# Patient Record
Sex: FEMALE | Race: WHITE | Employment: FULL TIME | URBAN - METROPOLITAN AREA
[De-identification: names, ages, dates, MRNs, and addresses within clinical notes are randomized per-mention and may not be internally consistent; named-entity substitution may affect disease eponyms.]

---

## 2017-02-05 PROBLEM — R82.71 GBS BACTERIURIA: Status: ACTIVE | Noted: 2017-02-05

## 2017-02-15 PROBLEM — R52 PAIN: Status: ACTIVE | Noted: 2017-02-15

## 2017-02-15 PROBLEM — Z37.9 NORMAL LABOR: Status: ACTIVE | Noted: 2017-02-15

## 2017-04-20 DIAGNOSIS — F41.9 ANXIETY: Primary | ICD-10-CM

## 2017-04-20 RX ORDER — ESCITALOPRAM OXALATE 10 MG/1
10 TABLET ORAL DAILY
Qty: 30 TABLET | Refills: 3 | Status: SHIPPED | OUTPATIENT
Start: 2017-04-20 | End: 2017-05-18 | Stop reason: SDUPTHER

## 2017-04-20 RX ORDER — HYDROXYZINE HYDROCHLORIDE 25 MG/1
25 TABLET, FILM COATED ORAL 2 TIMES DAILY PRN
Qty: 20 TABLET | Refills: 0 | Status: SHIPPED | OUTPATIENT
Start: 2017-04-20 | End: 2017-09-06 | Stop reason: SDUPTHER

## 2017-04-21 ENCOUNTER — TELEPHONE (OUTPATIENT)
Dept: INTERNAL MEDICINE CLINIC | Age: 22
End: 2017-04-21

## 2017-04-25 ENCOUNTER — OFFICE VISIT (OUTPATIENT)
Dept: INTERNAL MEDICINE CLINIC | Age: 22
End: 2017-04-25

## 2017-04-25 VITALS
HEIGHT: 62 IN | HEART RATE: 105 BPM | RESPIRATION RATE: 14 BRPM | BODY MASS INDEX: 25.98 KG/M2 | SYSTOLIC BLOOD PRESSURE: 120 MMHG | WEIGHT: 141.2 LBS | TEMPERATURE: 98.2 F | OXYGEN SATURATION: 99 % | DIASTOLIC BLOOD PRESSURE: 69 MMHG

## 2017-04-25 DIAGNOSIS — F41.9 ANXIETY: Primary | ICD-10-CM

## 2017-04-25 DIAGNOSIS — F33.1 MODERATE EPISODE OF RECURRENT MAJOR DEPRESSIVE DISORDER (HCC): ICD-10-CM

## 2017-04-25 PROCEDURE — 99213 OFFICE O/P EST LOW 20 MIN: CPT | Performed by: NURSE PRACTITIONER

## 2017-04-25 RX ORDER — BUSPIRONE HYDROCHLORIDE 7.5 MG/1
7.5 TABLET ORAL 2 TIMES DAILY
Qty: 60 TABLET | Refills: 1 | Status: SHIPPED | OUTPATIENT
Start: 2017-04-25 | End: 2017-05-18 | Stop reason: SDUPTHER

## 2017-05-05 ENCOUNTER — OFFICE VISIT (OUTPATIENT)
Dept: PSYCHOLOGY | Age: 22
End: 2017-05-05

## 2017-05-05 DIAGNOSIS — F32.A ANXIETY AND DEPRESSION: Primary | ICD-10-CM

## 2017-05-05 DIAGNOSIS — F41.9 ANXIETY AND DEPRESSION: Primary | ICD-10-CM

## 2017-05-05 PROCEDURE — 90791 PSYCH DIAGNOSTIC EVALUATION: CPT | Performed by: PSYCHOLOGIST

## 2017-05-05 ASSESSMENT — PATIENT HEALTH QUESTIONNAIRE - PHQ9
7. TROUBLE CONCENTRATING ON THINGS, SUCH AS READING THE NEWSPAPER OR WATCHING TELEVISION: 2
SUM OF ALL RESPONSES TO PHQ QUESTIONS 1-9: 20
1. LITTLE INTEREST OR PLEASURE IN DOING THINGS: 2
8. MOVING OR SPEAKING SO SLOWLY THAT OTHER PEOPLE COULD HAVE NOTICED. OR THE OPPOSITE, BEING SO FIGETY OR RESTLESS THAT YOU HAVE BEEN MOVING AROUND A LOT MORE THAN USUAL: 1
10. IF YOU CHECKED OFF ANY PROBLEMS, HOW DIFFICULT HAVE THESE PROBLEMS MADE IT FOR YOU TO DO YOUR WORK, TAKE CARE OF THINGS AT HOME, OR GET ALONG WITH OTHER PEOPLE: 3
2. FEELING DOWN, DEPRESSED OR HOPELESS: 2
9. THOUGHTS THAT YOU WOULD BE BETTER OFF DEAD, OR OF HURTING YOURSELF: 2
4. FEELING TIRED OR HAVING LITTLE ENERGY: 3
6. FEELING BAD ABOUT YOURSELF - OR THAT YOU ARE A FAILURE OR HAVE LET YOURSELF OR YOUR FAMILY DOWN: 3
SUM OF ALL RESPONSES TO PHQ9 QUESTIONS 1 & 2: 4
5. POOR APPETITE OR OVEREATING: 2
3. TROUBLE FALLING OR STAYING ASLEEP: 3

## 2017-05-18 ENCOUNTER — OFFICE VISIT (OUTPATIENT)
Dept: INTERNAL MEDICINE CLINIC | Age: 22
End: 2017-05-18

## 2017-05-18 VITALS
OXYGEN SATURATION: 99 % | HEART RATE: 101 BPM | HEIGHT: 61 IN | DIASTOLIC BLOOD PRESSURE: 60 MMHG | BODY MASS INDEX: 26.62 KG/M2 | TEMPERATURE: 98.4 F | WEIGHT: 141 LBS | SYSTOLIC BLOOD PRESSURE: 110 MMHG

## 2017-05-18 DIAGNOSIS — F41.9 ANXIETY: Primary | ICD-10-CM

## 2017-05-18 DIAGNOSIS — Z72.51 UNPROTECTED SEXUAL INTERCOURSE: ICD-10-CM

## 2017-05-18 PROCEDURE — 99213 OFFICE O/P EST LOW 20 MIN: CPT | Performed by: NURSE PRACTITIONER

## 2017-05-18 RX ORDER — ESCITALOPRAM OXALATE 10 MG/1
10 TABLET ORAL DAILY
Qty: 30 TABLET | Refills: 5 | Status: SHIPPED | OUTPATIENT
Start: 2017-05-18 | End: 2017-09-06 | Stop reason: SDUPTHER

## 2017-05-18 RX ORDER — BUSPIRONE HYDROCHLORIDE 7.5 MG/1
7.5 TABLET ORAL 2 TIMES DAILY
Qty: 60 TABLET | Refills: 5 | Status: SHIPPED | OUTPATIENT
Start: 2017-05-18 | End: 2017-09-06 | Stop reason: SDUPTHER

## 2017-05-18 RX ORDER — LEVONORGESTREL 1.5 MG/1
1.5 TABLET ORAL ONCE
Qty: 1 TABLET | Refills: 0 | Status: SHIPPED | OUTPATIENT
Start: 2017-05-18 | End: 2017-08-08

## 2017-06-16 ENCOUNTER — OFFICE VISIT (OUTPATIENT)
Dept: INTERNAL MEDICINE CLINIC | Age: 22
End: 2017-06-16

## 2017-06-16 VITALS
HEART RATE: 116 BPM | OXYGEN SATURATION: 97 % | HEIGHT: 61 IN | BODY MASS INDEX: 26.85 KG/M2 | WEIGHT: 142.2 LBS | DIASTOLIC BLOOD PRESSURE: 68 MMHG | SYSTOLIC BLOOD PRESSURE: 104 MMHG

## 2017-06-16 DIAGNOSIS — R05.9 COUGH: Primary | ICD-10-CM

## 2017-06-16 PROCEDURE — 99213 OFFICE O/P EST LOW 20 MIN: CPT | Performed by: INTERNAL MEDICINE

## 2017-06-16 RX ORDER — IBUPROFEN 800 MG/1
800 TABLET ORAL EVERY 8 HOURS PRN
Qty: 120 TABLET | Refills: 3 | Status: SHIPPED | OUTPATIENT
Start: 2017-06-16 | End: 2017-12-06 | Stop reason: ALTCHOICE

## 2017-06-16 RX ORDER — BENZONATATE 200 MG/1
200 CAPSULE ORAL 3 TIMES DAILY PRN
Qty: 30 CAPSULE | Refills: 0 | Status: SHIPPED | OUTPATIENT
Start: 2017-06-16 | End: 2017-06-23

## 2017-06-16 RX ORDER — BROMPHENIRAMINE MALEATE, PSEUDOEPHEDRINE HYDROCHLORIDE, AND DEXTROMETHORPHAN HYDROBROMIDE 2; 30; 10 MG/5ML; MG/5ML; MG/5ML
5 SYRUP ORAL 4 TIMES DAILY PRN
Qty: 120 ML | Refills: 1 | Status: SHIPPED | OUTPATIENT
Start: 2017-06-16 | End: 2017-12-06 | Stop reason: ALTCHOICE

## 2017-06-16 ASSESSMENT — ENCOUNTER SYMPTOMS
SPUTUM PRODUCTION: 0
SHORTNESS OF BREATH: 1
COUGH: 1
EYES NEGATIVE: 1
WHEEZING: 1
GASTROINTESTINAL NEGATIVE: 1
HEMOPTYSIS: 0

## 2017-07-10 ENCOUNTER — TELEPHONE (OUTPATIENT)
Dept: INTERNAL MEDICINE CLINIC | Age: 22
End: 2017-07-10

## 2017-07-10 RX ORDER — LEVONORGESTREL 1.5 MG/1
1.5 TABLET ORAL ONCE
Qty: 1 TABLET | Refills: 0 | Status: SHIPPED | OUTPATIENT
Start: 2017-07-10 | End: 2017-07-10 | Stop reason: SDUPTHER

## 2017-07-10 RX ORDER — LEVONORGESTREL 1.5 MG/1
1.5 TABLET ORAL ONCE
Qty: 1 TABLET | Refills: 0 | Status: ON HOLD | OUTPATIENT
Start: 2017-07-10 | End: 2018-06-08 | Stop reason: HOSPADM

## 2017-07-21 ENCOUNTER — TELEPHONE (OUTPATIENT)
Dept: INTERNAL MEDICINE CLINIC | Age: 22
End: 2017-07-21

## 2017-08-08 ENCOUNTER — OFFICE VISIT (OUTPATIENT)
Dept: INTERNAL MEDICINE CLINIC | Age: 22
End: 2017-08-08

## 2017-08-08 VITALS
HEIGHT: 61 IN | DIASTOLIC BLOOD PRESSURE: 60 MMHG | BODY MASS INDEX: 26.58 KG/M2 | SYSTOLIC BLOOD PRESSURE: 102 MMHG | RESPIRATION RATE: 16 BRPM | WEIGHT: 140.8 LBS | HEART RATE: 90 BPM

## 2017-08-08 DIAGNOSIS — R11.0 NAUSEA: Primary | ICD-10-CM

## 2017-08-08 DIAGNOSIS — G44.52 NEW DAILY PERSISTENT HEADACHE: ICD-10-CM

## 2017-08-08 PROCEDURE — 99213 OFFICE O/P EST LOW 20 MIN: CPT | Performed by: INTERNAL MEDICINE

## 2017-08-08 RX ORDER — PROMETHAZINE HYDROCHLORIDE 25 MG/1
25 TABLET ORAL EVERY 6 HOURS PRN
Qty: 20 TABLET | Refills: 1 | Status: SHIPPED | OUTPATIENT
Start: 2017-08-08 | End: 2017-12-06

## 2017-08-08 RX ORDER — KETOROLAC TROMETHAMINE 30 MG/ML
30 INJECTION, SOLUTION INTRAMUSCULAR; INTRAVENOUS ONCE
Status: COMPLETED | OUTPATIENT
Start: 2017-08-08 | End: 2017-08-08

## 2017-08-08 RX ADMIN — KETOROLAC TROMETHAMINE 60 MG: 30 INJECTION, SOLUTION INTRAMUSCULAR; INTRAVENOUS at 14:45

## 2017-09-01 ENCOUNTER — TELEPHONE (OUTPATIENT)
Dept: INTERNAL MEDICINE CLINIC | Age: 22
End: 2017-09-01

## 2017-09-01 DIAGNOSIS — F41.9 ANXIETY: ICD-10-CM

## 2017-09-06 ENCOUNTER — OFFICE VISIT (OUTPATIENT)
Dept: INTERNAL MEDICINE CLINIC | Age: 22
End: 2017-09-06

## 2017-09-06 VITALS
SYSTOLIC BLOOD PRESSURE: 122 MMHG | RESPIRATION RATE: 18 BRPM | WEIGHT: 141 LBS | DIASTOLIC BLOOD PRESSURE: 78 MMHG | HEART RATE: 106 BPM | OXYGEN SATURATION: 98 % | BODY MASS INDEX: 26.64 KG/M2

## 2017-09-06 DIAGNOSIS — F41.9 ANXIETY: ICD-10-CM

## 2017-09-06 DIAGNOSIS — Z23 NEEDS FLU SHOT: ICD-10-CM

## 2017-09-06 DIAGNOSIS — R51.9 SEVERE FRONTAL HEADACHES: Primary | ICD-10-CM

## 2017-09-06 PROCEDURE — 90471 IMMUNIZATION ADMIN: CPT | Performed by: INTERNAL MEDICINE

## 2017-09-06 PROCEDURE — 99213 OFFICE O/P EST LOW 20 MIN: CPT | Performed by: INTERNAL MEDICINE

## 2017-09-06 PROCEDURE — 90686 IIV4 VACC NO PRSV 0.5 ML IM: CPT | Performed by: INTERNAL MEDICINE

## 2017-09-06 RX ORDER — BUSPIRONE HYDROCHLORIDE 7.5 MG/1
7.5 TABLET ORAL 2 TIMES DAILY
Qty: 60 TABLET | Refills: 5 | Status: SHIPPED | OUTPATIENT
Start: 2017-09-06 | End: 2017-12-06

## 2017-09-06 RX ORDER — ESCITALOPRAM OXALATE 10 MG/1
10 TABLET ORAL DAILY
Qty: 30 TABLET | Refills: 5 | Status: SHIPPED | OUTPATIENT
Start: 2017-09-06 | End: 2017-12-06

## 2017-09-06 RX ORDER — HYDROXYZINE HYDROCHLORIDE 25 MG/1
25 TABLET, FILM COATED ORAL 2 TIMES DAILY PRN
Qty: 20 TABLET | Refills: 0 | Status: SHIPPED | OUTPATIENT
Start: 2017-09-06 | End: 2017-09-16

## 2017-09-06 RX ORDER — SUMATRIPTAN 100 MG/1
100 TABLET, FILM COATED ORAL
Qty: 9 TABLET | Refills: 3 | Status: SHIPPED | OUTPATIENT
Start: 2017-09-06 | End: 2017-12-06 | Stop reason: SINTOL

## 2017-09-06 ASSESSMENT — ENCOUNTER SYMPTOMS
DIARRHEA: 0
STRIDOR: 0
EYE DISCHARGE: 0
BLURRED VISION: 0
NAUSEA: 0
ABDOMINAL PAIN: 0
EYE PAIN: 0
COUGH: 0
RESPIRATORY NEGATIVE: 1
BACK PAIN: 1
GASTROINTESTINAL NEGATIVE: 1
CONSTIPATION: 0
EYES NEGATIVE: 1
DOUBLE VISION: 0
WHEEZING: 0
BLOOD IN STOOL: 0
PHOTOPHOBIA: 0
EYE REDNESS: 0
HEARTBURN: 0
VOMITING: 0
ORTHOPNEA: 0
SORE THROAT: 0
SHORTNESS OF BREATH: 0
SPUTUM PRODUCTION: 0
HEMOPTYSIS: 0

## 2017-09-12 ENCOUNTER — TELEPHONE (OUTPATIENT)
Dept: INTERNAL MEDICINE CLINIC | Age: 22
End: 2017-09-12

## 2017-10-13 ENCOUNTER — TELEPHONE (OUTPATIENT)
Dept: INTERNAL MEDICINE CLINIC | Age: 22
End: 2017-10-13

## 2017-10-13 NOTE — TELEPHONE ENCOUNTER
Patient is calling to ask if you will send magic mouth wash to her pharmacy for a canker sore which is [place] [id number] Shahid Pinto, [address]

## 2017-10-20 ENCOUNTER — TELEPHONE (OUTPATIENT)
Dept: INTERNAL MEDICINE CLINIC | Age: 22
End: 2017-10-20

## 2017-10-20 DIAGNOSIS — Z34.90 PRENATAL CARE, UNSPECIFIED TRIMESTER: ICD-10-CM

## 2017-10-20 NOTE — TELEPHONE ENCOUNTER
Sent to pharmacy but pt needs to stop all other medication and see obyn asap. Tried calling pt no answer and could not leave voicemail.

## 2017-10-20 NOTE — TELEPHONE ENCOUNTER
Pt is asking if alla will order her prenatal vitamins to pharmacy below, she states catee ordered these for her during her last pregnancy. Cheryle Renae Duke [place], [place] [phone] Presley Gomez [phone]     Disp Refills Start End    Prenatal Multivit-Min-Fe-FA (PRENATAL VITAMINS) 0.8 MG TABS (Discontinued) 30 tablet 9 6/30/2016 4/25/2017    Sig - Route:  Take 1 tablet by mouth daily - Oral

## 2017-12-06 ENCOUNTER — TELEPHONE (OUTPATIENT)
Dept: INTERNAL MEDICINE CLINIC | Age: 22
End: 2017-12-06

## 2017-12-06 ENCOUNTER — OFFICE VISIT (OUTPATIENT)
Dept: INTERNAL MEDICINE CLINIC | Age: 22
End: 2017-12-06

## 2017-12-06 VITALS
HEIGHT: 62 IN | BODY MASS INDEX: 24.56 KG/M2 | RESPIRATION RATE: 16 BRPM | TEMPERATURE: 97.7 F | WEIGHT: 133.5 LBS | SYSTOLIC BLOOD PRESSURE: 110 MMHG | HEART RATE: 113 BPM | DIASTOLIC BLOOD PRESSURE: 78 MMHG | OXYGEN SATURATION: 98 %

## 2017-12-06 DIAGNOSIS — I47.9 PAROXYSMAL TACHYCARDIA (HCC): Primary | ICD-10-CM

## 2017-12-06 DIAGNOSIS — Z23 NEED FOR TUBERCULOSIS VACCINATION: ICD-10-CM

## 2017-12-06 DIAGNOSIS — Z3A.12 12 WEEKS GESTATION OF PREGNANCY: ICD-10-CM

## 2017-12-06 DIAGNOSIS — Z23 NEED FOR PROPHYLACTIC VACCINATION AGAINST HEPATITIS A AND HEPATITIS B: ICD-10-CM

## 2017-12-06 PROCEDURE — 90472 IMMUNIZATION ADMIN EACH ADD: CPT | Performed by: INTERNAL MEDICINE

## 2017-12-06 PROCEDURE — 90471 IMMUNIZATION ADMIN: CPT | Performed by: INTERNAL MEDICINE

## 2017-12-06 PROCEDURE — 90632 HEPA VACCINE ADULT IM: CPT | Performed by: INTERNAL MEDICINE

## 2017-12-06 PROCEDURE — 99213 OFFICE O/P EST LOW 20 MIN: CPT | Performed by: INTERNAL MEDICINE

## 2017-12-06 PROCEDURE — 86580 TB INTRADERMAL TEST: CPT | Performed by: INTERNAL MEDICINE

## 2017-12-06 PROCEDURE — 90746 HEPB VACCINE 3 DOSE ADULT IM: CPT | Performed by: INTERNAL MEDICINE

## 2017-12-06 ASSESSMENT — ENCOUNTER SYMPTOMS
EYE PAIN: 0
BLOOD IN STOOL: 0
EYES NEGATIVE: 1
DIARRHEA: 0
WHEEZING: 0
BLURRED VISION: 0
SORE THROAT: 0
HEMOPTYSIS: 0
EYE DISCHARGE: 0
DOUBLE VISION: 0
EYE REDNESS: 0
NAUSEA: 0
VOMITING: 0
STRIDOR: 0
SPUTUM PRODUCTION: 0
BACK PAIN: 1
HEARTBURN: 0
CONSTIPATION: 0
GASTROINTESTINAL NEGATIVE: 1
RESPIRATORY NEGATIVE: 1
ORTHOPNEA: 0
ABDOMINAL PAIN: 0
PHOTOPHOBIA: 0
COUGH: 0
SHORTNESS OF BREATH: 0

## 2017-12-06 NOTE — PROGRESS NOTES
no tenderness. There is no rebound and no guarding. Musculoskeletal: Normal range of motion. She exhibits no edema or tenderness. Lymphadenopathy:     She has no cervical adenopathy. Neurological: She is alert and oriented to person, place, and time. She has normal reflexes. No cranial nerve deficit. She exhibits normal muscle tone. Gait normal. Coordination normal.   Skin: Skin is warm and dry. No rash noted. She is not diaphoretic. No erythema. No pallor. Psychiatric: Mood, memory, affect and judgment normal.   Nursing note and vitals reviewed. Assessment/Plan:      Encounter Diagnoses   Name Primary?  Paroxysmal tachycardia ([address] Yes    Need for prophylactic vaccination against hepatitis A and hepatitis B     12 weeks gestation of pregnancy        1. Paroxysmal tachycardia ([address]  Ok for work physical, no     2. Need for prophylactic vaccination against hepatitis A and hepatitis B  Will give twinrix today, not immune. 3. 12 weeks gestation of pregnancy  Will get on DHA  - DHA-EPA-Vit B6-B12-Folic Acid CAPS; 1 capsule daily for prenatal care  Dispense: 30 capsule; Refill: 11        Additional Orders:      No orders of the defined types were placed in this encounter. Orders Placed This Encounter   Medications    DHA-EPA-Vit B6-B12-Folic Acid CAPS     Si capsule daily for prenatal care     Dispense:  30 capsule     Refill:  11       DISPOSITION:      Return in about 1 year (around 2018).   1. Greater than 15 minutes spent with patient and significant other and >10 minutes on medication dosing, use and lifestyle modifications, home safety    Valeria Soto MD

## 2018-01-04 ENCOUNTER — TELEPHONE (OUTPATIENT)
Dept: INTERNAL MEDICINE CLINIC | Age: 23
End: 2018-01-04

## 2018-01-04 NOTE — TELEPHONE ENCOUNTER
Per Dr Shea Screen go to the ER immediately.  [facility]

## 2018-01-29 ENCOUNTER — TELEPHONE (OUTPATIENT)
Dept: INTERNAL MEDICINE CLINIC | Age: 23
End: 2018-01-29

## 2018-01-30 ENCOUNTER — OFFICE VISIT (OUTPATIENT)
Dept: INTERNAL MEDICINE CLINIC | Age: 23
End: 2018-01-30

## 2018-01-30 VITALS
WEIGHT: 131.2 LBS | SYSTOLIC BLOOD PRESSURE: 120 MMHG | DIASTOLIC BLOOD PRESSURE: 70 MMHG | TEMPERATURE: 98.6 F | HEIGHT: 61 IN | OXYGEN SATURATION: 99 % | HEART RATE: 108 BPM | BODY MASS INDEX: 24.77 KG/M2

## 2018-01-30 DIAGNOSIS — Z3A.19 19 WEEKS GESTATION OF PREGNANCY: ICD-10-CM

## 2018-01-30 DIAGNOSIS — J10.1 INFLUENZA A: Primary | ICD-10-CM

## 2018-01-30 PROCEDURE — G8420 CALC BMI NORM PARAMETERS: HCPCS | Performed by: NURSE PRACTITIONER

## 2018-01-30 PROCEDURE — 1036F TOBACCO NON-USER: CPT | Performed by: NURSE PRACTITIONER

## 2018-01-30 PROCEDURE — G8427 DOCREV CUR MEDS BY ELIG CLIN: HCPCS | Performed by: NURSE PRACTITIONER

## 2018-01-30 PROCEDURE — 99213 OFFICE O/P EST LOW 20 MIN: CPT | Performed by: NURSE PRACTITIONER

## 2018-01-30 PROCEDURE — G8484 FLU IMMUNIZE NO ADMIN: HCPCS | Performed by: NURSE PRACTITIONER

## 2018-01-30 RX ORDER — OSELTAMIVIR PHOSPHATE 75 MG/1
75 CAPSULE ORAL 2 TIMES DAILY
Qty: 10 CAPSULE | Refills: 0 | Status: SHIPPED | OUTPATIENT
Start: 2018-01-30 | End: 2018-02-04

## 2018-01-30 ASSESSMENT — ENCOUNTER SYMPTOMS
VOMITING: 0
NAUSEA: 0
COUGH: 1
SORE THROAT: 1
ABDOMINAL PAIN: 0

## 2018-01-30 NOTE — PATIENT INSTRUCTIONS
Patient Education        Influenza (Flu): Care Instructions  Your Care Instructions    Influenza (flu) is an infection in the lungs and breathing passages. It is caused by the influenza virus. There are different strains, or types, of the flu virus from year to year. Unlike the common cold, the flu comes on suddenly and the symptoms, such as a cough, congestion, fever, chills, fatigue, aches, and pains, are more severe. These symptoms may last up to 10 days. Although the flu can make you feel very sick, it usually doesn't cause serious health problems. Home treatment is usually all you need for flu symptoms. But your doctor may prescribe antiviral medicine to prevent other health problems, such as pneumonia, from developing. Older people and those who have a long-term health condition, such as lung disease, are most at risk for having pneumonia or other health problems. Follow-up care is a key part of your treatment and safety. Be sure to make and go to all appointments, and call your doctor if you are having problems. It's also a good idea to know your test results and keep a list of the medicines you take. How can you care for yourself at home? · Get plenty of rest.  · Drink plenty of fluids, enough so that your urine is light yellow or clear like water. If you have kidney, heart, or liver disease and have to limit fluids, talk with your doctor before you increase the amount of fluids you drink. · Take an over-the-counter pain medicine if needed, such as acetaminophen (Tylenol), ibuprofen (Advil, Motrin), or naproxen (Aleve), to relieve fever, headache, and muscle aches. Read and follow all instructions on the label. No one younger than 20 should take aspirin. It has been linked to Reye syndrome, a serious illness. · Do not smoke. Smoking can make the flu worse. If you need help quitting, talk to your doctor about stop-smoking programs and medicines.  These can increase your chances of quitting for good.  · Breathe moist air from a hot shower or from a sink filled with hot water to help clear a stuffy nose. · Before you use cough and cold medicines, check the label. These medicines may not be safe for young children or for people with certain health problems. · If the skin around your nose and lips becomes sore, put some petroleum jelly on the area. · To ease coughing:  ¨ Drink fluids to soothe a scratchy throat. ¨ Suck on cough drops or plain hard candy. ¨ Take an over-the-counter cough medicine that contains dextromethorphan to help you get some sleep. Read and follow all instructions on the label. ¨ Raise your head at night with an extra pillow. This may help you rest if coughing keeps you awake. · Take any prescribed medicine exactly as directed. Call your doctor if you think you are having a problem with your medicine. To avoid spreading the flu  · Wash your hands regularly, and keep your hands away from your face. · Stay home from school, work, and other public places until you are feeling better and your fever has been gone for at least 24 hours. The fever needs to have gone away on its own without the help of medicine. · Ask people living with you to talk to their doctors about preventing the flu. They may get antiviral medicine to keep from getting the flu from you. · To prevent the flu in the future, get a flu vaccine every fall. Encourage people living with you to get the vaccine. · Cover your mouth when you cough or sneeze. When should you call for help? Call 911 anytime you think you may need emergency care. For example, call if:  ? · You have severe trouble breathing. ?Call your doctor now or seek immediate medical care if:  ? · You have new or worse trouble breathing. ? · You seem to be getting much sicker. ? · You feel very sleepy or confused. ? · You have a new or higher fever. ? · You get a new rash. ? Watch closely for changes in your health, and be sure to contact your doctor if:  ? · You begin to get better and then get worse. ? · You are not getting better after 1 week. Where can you learn more? Go to [email or web address] and sign in to your [employer] account. Enter J558 in the [facility] box to learn more about \"Influenza (Flu): Care Instructions. \"     If you do not have an account, please click on the \"Sign Up Now\" link. Current as of: May 12, 2017  Content Version: 11.5  © 9780-5857 Healthwise, Incorporated. Care instructions adapted under license by [facility] ([facility]). If you have questions about a medical condition or this instruction, always ask your healthcare professional. [address] any warranty or liability for your use of this information.

## 2018-01-30 NOTE — PROGRESS NOTES
Department of Internal Medicine  Clinic Note    Date: 1/30/2018                                               Subjective/Objective:     Chief Complaint   Patient presents with    Influenza     positive A at [address] x4, 19 weeks and 2 days pregnant       HPI     Here for eval of body aches, fever, cough, sorethroat 2 days ago. Was tested at work yesterday (Works at [employer]) and tested positive for influenza A. She is here for treatment. She is 19 weeks pregnant. Using tylenol otc. No sob. She does c/o chest burning with cough. Current Outpatient Prescriptions   Medication Sig Dispense Refill    oseltamivir (TAMIFLU) 75 MG capsule Take 1 capsule by mouth 2 times daily for 5 days 10 capsule 0    DHA-EPA-Vit B6-B12-Folic Acid CAPS 1 capsule daily for prenatal care 30 capsule 11    Prenatal Multivit-Min-Fe-FA (PRENATAL VITAMINS) 0.8 MG TABS Take 1 tablet by mouth daily 90 tablet 3    nystatin (MYCOSTATIN) 519170 UNIT/ML suspension Take 5 mLs by mouth 4 times daily SWISH AND SPIT 60 mL 0    levonorgestrel (PLAN B ONE STEP) 1.5 MG tablet Take 1 tablet by mouth once for 1 dose 1 tablet 0     No current facility-administered medications for this visit. Allergies   Allergen Reactions    Adhesive Tape      SKIN IRRITATION    Zithromax [Azithromycin Dihydrate] Hives    Morphine Nausea And Vomiting and Other (See Comments)     H/A       Review of Systems   Constitutional: Positive for chills and fever. HENT: Positive for sore throat. Respiratory: Positive for cough. Gastrointestinal: Negative for abdominal pain, nausea and vomiting. Genitourinary: Negative for dysuria, vaginal bleeding, vaginal discharge and vaginal pain. Musculoskeletal: Positive for myalgias. Neurological: Negative for dizziness, weakness, numbness and headaches. All other systems reviewed and are negative.       Vitals:  /70 (Site: Left Arm, Position: Sitting, Cuff Size: Small Adult)

## 2018-04-30 ENCOUNTER — PROCEDURE VISIT (OUTPATIENT)
Dept: SURGERY | Age: 23
End: 2018-04-30

## 2018-04-30 DIAGNOSIS — M79.604 PAIN IN RIGHT LEG: Primary | ICD-10-CM

## 2018-04-30 PROCEDURE — 93971 EXTREMITY STUDY: CPT | Performed by: SURGERY

## 2018-06-06 PROBLEM — R52 PAIN: Status: ACTIVE | Noted: 2018-06-06

## 2018-06-06 PROBLEM — Z34.90 PREGNANT AND NOT YET DELIVERED, UNSPECIFIED TRIMESTER: Status: ACTIVE | Noted: 2018-06-06

## 2018-06-06 PROBLEM — Z34.90 PREGNANT: Status: ACTIVE | Noted: 2018-06-06

## 2018-07-06 PROBLEM — R52 PAIN: Status: RESOLVED | Noted: 2018-06-06 | Resolved: 2018-07-06

## 2018-08-02 ENCOUNTER — OFFICE VISIT (OUTPATIENT)
Dept: INTERNAL MEDICINE CLINIC | Age: 23
End: 2018-08-02

## 2018-08-02 VITALS
OXYGEN SATURATION: 98 % | RESPIRATION RATE: 12 BRPM | HEART RATE: 88 BPM | SYSTOLIC BLOOD PRESSURE: 110 MMHG | DIASTOLIC BLOOD PRESSURE: 60 MMHG | HEIGHT: 61 IN | BODY MASS INDEX: 26.43 KG/M2 | WEIGHT: 140 LBS

## 2018-08-02 DIAGNOSIS — F32.A ANXIETY AND DEPRESSION: Primary | ICD-10-CM

## 2018-08-02 DIAGNOSIS — F41.9 ANXIETY AND DEPRESSION: Primary | ICD-10-CM

## 2018-08-02 PROCEDURE — 99213 OFFICE O/P EST LOW 20 MIN: CPT | Performed by: NURSE PRACTITIONER

## 2018-08-02 PROCEDURE — G8417 CALC BMI ABV UP PARAM F/U: HCPCS | Performed by: NURSE PRACTITIONER

## 2018-08-02 PROCEDURE — 1036F TOBACCO NON-USER: CPT | Performed by: NURSE PRACTITIONER

## 2018-08-02 PROCEDURE — G8427 DOCREV CUR MEDS BY ELIG CLIN: HCPCS | Performed by: NURSE PRACTITIONER

## 2018-08-02 RX ORDER — HYDROXYZINE HYDROCHLORIDE 25 MG/1
25 TABLET, FILM COATED ORAL 3 TIMES DAILY PRN
Qty: 30 TABLET | Refills: 0 | Status: SHIPPED | OUTPATIENT
Start: 2018-08-02 | End: 2018-08-12

## 2018-08-02 RX ORDER — FLUOXETINE 10 MG/1
10 CAPSULE ORAL DAILY
Qty: 30 CAPSULE | Refills: 3 | Status: SHIPPED | OUTPATIENT
Start: 2018-08-02 | End: 2018-08-28

## 2018-08-02 ASSESSMENT — ENCOUNTER SYMPTOMS
NAUSEA: 0
COUGH: 0
DIARRHEA: 0
VOMITING: 0
SHORTNESS OF BREATH: 0
ABDOMINAL PAIN: 0

## 2018-08-02 ASSESSMENT — PATIENT HEALTH QUESTIONNAIRE - PHQ9
1. LITTLE INTEREST OR PLEASURE IN DOING THINGS: 1
SUM OF ALL RESPONSES TO PHQ9 QUESTIONS 1 & 2: 2
SUM OF ALL RESPONSES TO PHQ QUESTIONS 1-9: 2
2. FEELING DOWN, DEPRESSED OR HOPELESS: 1

## 2018-08-02 NOTE — PROGRESS NOTES
Department of Internal Medicine  Clinic Note    Date: 8/2/2018                                               Subjective/Objective:     Chief Complaint   Patient presents with    Anxiety       HPI (location/radiation, quality, severity)    Pt here for eval of anxiety and depression. She had a baby 6/6 and was started on prozac in the hospital d/t h/o post partum depression. She states she never took the medication after she was discharged from the hospital. She does feel depressed and anxious on a daily basis. She denies suicidal or homicidal ideation. She denies any thoughts of harming the baby. She does have sleep disturbances which she attributes somewhat to the anxiety and depression. Patient is interested in being restarted on medication for the depression and anxiety and also interested in speaking to a psychiatrist. She did see Dr. Elmer Andersen, psychology here in the office. Tobacco:  reports that she has never smoked. She has never used smokeless tobacco.  ETOH:  reports that she drinks alcohol. Current Outpatient Prescriptions   Medication Sig Dispense Refill    FLUoxetine (PROZAC) 10 MG capsule Take 1 capsule by mouth daily 30 capsule 3    hydrOXYzine (ATARAX) 25 MG tablet Take 1 tablet by mouth 3 times daily as needed for Anxiety 30 tablet 0    ibuprofen (ADVIL;MOTRIN) 800 MG tablet Take 1 tablet by mouth every 8 hours as needed for Pain 60 tablet 1    FLUoxetine (PROZAC) 10 MG capsule Take 1 capsule by mouth daily 30 capsule 1    docusate sodium (COLACE, DULCOLAX) 100 MG CAPS Take 100 mg by mouth 2 times daily 60 capsule 1    Prenatal Multivit-Min-Fe-FA (PRENATAL VITAMINS) 0.8 MG TABS Take 1 tablet by mouth daily 90 tablet 3     No current facility-administered medications for this visit.         Allergies   Allergen Reactions    Adhesive Tape      SKIN IRRITATION    Zithromax [Azithromycin Dihydrate] Hives    Morphine Nausea And Vomiting and Other (See Comments)     H/A       Review of by mouth daily  -     hydrOXYzine (ATARAX) 25 MG tablet; Take 1 tablet by mouth 3 times daily as needed for Anxiety  Conservative measures discussed. Discussed possibly using melatonin at bedtime as needed to help with sleep. Discussed with patient and she is to have any increased depressive symptoms, suicidal or homicidal ideation to immediately call the office and/or go to the emergency department. Patient verbalized understanding to this. I did give patient a referral to Pawel Bolton psychiatric nurse practitioner and recommended that the patient make an appointment for us and if she could get one     Past, Family, and Social history reviewed with patient during this visit. RX management: see under diagnosis section    No orders of the defined types were placed in this encounter. Return if symptoms worsen or fail to improve, for Pawel Bolton, psychiatric nurse practitioner 1-2 weeks.       MARTY Veras     8/2/2018  3:23 PM

## 2018-08-17 ENCOUNTER — TELEPHONE (OUTPATIENT)
Dept: INTERNAL MEDICINE CLINIC | Age: 23
End: 2018-08-17

## 2018-08-17 NOTE — TELEPHONE ENCOUNTER
pts new patient appt was canceleld 8/14 with dante. Pt states she was supposed to hear back on a reschedule time and has not heard anything as of yet.  Is there a time pt can be rescheduled?      #[phone]

## 2018-08-28 ENCOUNTER — OFFICE VISIT (OUTPATIENT)
Dept: PSYCHIATRY | Age: 23
End: 2018-08-28

## 2018-08-28 VITALS
WEIGHT: 139.4 LBS | HEART RATE: 70 BPM | BODY MASS INDEX: 25.65 KG/M2 | DIASTOLIC BLOOD PRESSURE: 78 MMHG | HEIGHT: 62 IN | SYSTOLIC BLOOD PRESSURE: 112 MMHG

## 2018-08-28 DIAGNOSIS — F33.2 SEVERE EPISODE OF RECURRENT MAJOR DEPRESSIVE DISORDER, WITHOUT PSYCHOTIC FEATURES (HCC): ICD-10-CM

## 2018-08-28 DIAGNOSIS — F41.1 GAD (GENERALIZED ANXIETY DISORDER): Primary | ICD-10-CM

## 2018-08-28 LAB
A/G RATIO: 1.4 (ref 1.1–2.2)
ALBUMIN SERPL-MCNC: 4.8 G/DL (ref 3.4–5)
ALP BLD-CCNC: 93 U/L (ref 40–129)
ALT SERPL-CCNC: 30 U/L (ref 10–40)
AMPHETAMINE SCREEN, URINE: NORMAL
ANION GAP SERPL CALCULATED.3IONS-SCNC: 15 MMOL/L (ref 3–16)
AST SERPL-CCNC: 22 U/L (ref 15–37)
BARBITURATE SCREEN URINE: NORMAL
BASOPHILS ABSOLUTE: 0 K/UL (ref 0–0.2)
BASOPHILS RELATIVE PERCENT: 0.3 %
BENZODIAZEPINE SCREEN, URINE: NORMAL
BILIRUB SERPL-MCNC: 0.3 MG/DL (ref 0–1)
BUN BLDV-MCNC: 6 MG/DL (ref 7–20)
CALCIUM SERPL-MCNC: 9.5 MG/DL (ref 8.3–10.6)
CANNABINOID SCREEN URINE: NORMAL
CHLORIDE BLD-SCNC: 103 MMOL/L (ref 99–110)
CHOLESTEROL, TOTAL: 129 MG/DL (ref 0–199)
CO2: 23 MMOL/L (ref 21–32)
COCAINE METABOLITE SCREEN URINE: NORMAL
CREAT SERPL-MCNC: 0.7 MG/DL (ref 0.6–1.1)
EOSINOPHILS ABSOLUTE: 0.1 K/UL (ref 0–0.6)
EOSINOPHILS RELATIVE PERCENT: 1.3 %
GFR AFRICAN AMERICAN: >60
GFR NON-AFRICAN AMERICAN: >60
GLOBULIN: 3.4 G/DL
GLUCOSE BLD-MCNC: 90 MG/DL (ref 70–99)
HCG(URINE) PREGNANCY TEST: NEGATIVE
HCT VFR BLD CALC: 34.3 % (ref 36–48)
HDLC SERPL-MCNC: 38 MG/DL (ref 40–60)
HEMOGLOBIN: 11.1 G/DL (ref 12–16)
LDL CHOLESTEROL CALCULATED: 73 MG/DL
LYMPHOCYTES ABSOLUTE: 1.7 K/UL (ref 1–5.1)
LYMPHOCYTES RELATIVE PERCENT: 22.9 %
Lab: NORMAL
MCH RBC QN AUTO: 23.3 PG (ref 26–34)
MCHC RBC AUTO-ENTMCNC: 32.3 G/DL (ref 31–36)
MCV RBC AUTO: 72.3 FL (ref 80–100)
METHADONE SCREEN, URINE: NORMAL
MONOCYTES ABSOLUTE: 0.5 K/UL (ref 0–1.3)
MONOCYTES RELATIVE PERCENT: 6.8 %
NEUTROPHILS ABSOLUTE: 5 K/UL (ref 1.7–7.7)
NEUTROPHILS RELATIVE PERCENT: 68.7 %
OPIATE SCREEN URINE: NORMAL
OXYCODONE URINE: NORMAL
PDW BLD-RTO: 16.1 % (ref 12.4–15.4)
PH UA: 6
PHENCYCLIDINE SCREEN URINE: NORMAL
PLATELET # BLD: 367 K/UL (ref 135–450)
PMV BLD AUTO: 8 FL (ref 5–10.5)
POTASSIUM SERPL-SCNC: 4.2 MMOL/L (ref 3.5–5.1)
PROPOXYPHENE SCREEN: NORMAL
RBC # BLD: 4.74 M/UL (ref 4–5.2)
SODIUM BLD-SCNC: 141 MMOL/L (ref 136–145)
TOTAL PROTEIN: 8.2 G/DL (ref 6.4–8.2)
TRIGL SERPL-MCNC: 92 MG/DL (ref 0–150)
TSH REFLEX FT4: 1.24 UIU/ML (ref 0.27–4.2)
VITAMIN D 25-HYDROXY: 22.4 NG/ML
VLDLC SERPL CALC-MCNC: 18 MG/DL
WBC # BLD: 7.3 K/UL (ref 4–11)

## 2018-08-28 PROCEDURE — 1036F TOBACCO NON-USER: CPT | Performed by: NURSE PRACTITIONER

## 2018-08-28 PROCEDURE — G8427 DOCREV CUR MEDS BY ELIG CLIN: HCPCS | Performed by: NURSE PRACTITIONER

## 2018-08-28 PROCEDURE — G8417 CALC BMI ABV UP PARAM F/U: HCPCS | Performed by: NURSE PRACTITIONER

## 2018-08-28 PROCEDURE — 99204 OFFICE O/P NEW MOD 45 MIN: CPT | Performed by: NURSE PRACTITIONER

## 2018-08-28 RX ORDER — FLUOXETINE HYDROCHLORIDE 20 MG/1
20 CAPSULE ORAL DAILY
Qty: 30 CAPSULE | Refills: 2 | Status: SHIPPED | OUTPATIENT
Start: 2018-08-28 | End: 2018-10-25 | Stop reason: SDUPTHER

## 2018-08-28 RX ORDER — LORAZEPAM 0.5 MG/1
0.5 TABLET ORAL 2 TIMES DAILY PRN
Qty: 28 TABLET | Refills: 0 | Status: SHIPPED | OUTPATIENT
Start: 2018-08-28 | End: 2018-09-11

## 2018-08-28 ASSESSMENT — PATIENT HEALTH QUESTIONNAIRE - PHQ9
2. FEELING DOWN, DEPRESSED OR HOPELESS: 2
1. LITTLE INTEREST OR PLEASURE IN DOING THINGS: 3
10. IF YOU CHECKED OFF ANY PROBLEMS, HOW DIFFICULT HAVE THESE PROBLEMS MADE IT FOR YOU TO DO YOUR WORK, TAKE CARE OF THINGS AT HOME, OR GET ALONG WITH OTHER PEOPLE: 3
7. TROUBLE CONCENTRATING ON THINGS, SUCH AS READING THE NEWSPAPER OR WATCHING TELEVISION: 3
4. FEELING TIRED OR HAVING LITTLE ENERGY: 3
SUM OF ALL RESPONSES TO PHQ9 QUESTIONS 1 & 2: 5
9. THOUGHTS THAT YOU WOULD BE BETTER OFF DEAD, OR OF HURTING YOURSELF: 2
6. FEELING BAD ABOUT YOURSELF - OR THAT YOU ARE A FAILURE OR HAVE LET YOURSELF OR YOUR FAMILY DOWN: 2
SUM OF ALL RESPONSES TO PHQ QUESTIONS 1-9: 24
8. MOVING OR SPEAKING SO SLOWLY THAT OTHER PEOPLE COULD HAVE NOTICED. OR THE OPPOSITE, BEING SO FIGETY OR RESTLESS THAT YOU HAVE BEEN MOVING AROUND A LOT MORE THAN USUAL: 3
5. POOR APPETITE OR OVEREATING: 3
3. TROUBLE FALLING OR STAYING ASLEEP: 3
SUM OF ALL RESPONSES TO PHQ QUESTIONS 1-9: 24

## 2018-08-28 ASSESSMENT — ANXIETY QUESTIONNAIRES
2. NOT BEING ABLE TO STOP OR CONTROL WORRYING: 3-NEARLY EVERY DAY
6. BECOMING EASILY ANNOYED OR IRRITABLE: 3-NEARLY EVERY DAY
3. WORRYING TOO MUCH ABOUT DIFFERENT THINGS: 3-NEARLY EVERY DAY
1. FEELING NERVOUS, ANXIOUS, OR ON EDGE: 3-NEARLY EVERY DAY
4. TROUBLE RELAXING: 3-NEARLY EVERY DAY
GAD7 TOTAL SCORE: 20
7. FEELING AFRAID AS IF SOMETHING AWFUL MIGHT HAPPEN: 3-NEARLY EVERY DAY
5. BEING SO RESTLESS THAT IT IS HARD TO SIT STILL: 2-OVER HALF THE DAYS

## 2018-08-28 NOTE — PROGRESS NOTES
day\"   Rehabs/Complicated W/D: denies    Past Medical/Surgical History:   Past Medical History:   Diagnosis Date    Allergic rhinitis     Anxiety     Depression     Headache(784.0)     Miscarriage 12/15     Past Surgical History:   Procedure Laterality Date    TYMPANOSTOMY TUBE PLACEMENT           PCP: AMMY Florian CNP      Social/Developmental History:    Developmental: Born and raised/upbringing: [place]   Marital: dating current boyfriend and father of children x 2 years   Children: 21 month old son, 4 month old son (born 6/2018)   Family: parents recently ; 4 sibs, limited contact with 2 brothers and one sister, recently starting to develop relationship with younger sister   Housing: with boyfriend and 2 sons, friend and her boyfriend and their son   Occupation/Income: [employer], [place] [place] [employer], full time   Education: has 2 terms left [facility] [employer] [facility] [facility] denies              Restoration:  denies   Legal hx: denies   Trauma hx: denies   Violence hx: + fights in high school, was expelled freshman year   Access to firearms: No    Primary Support System: none    Family History:    Medical/Psychiatric History:  Family History   Problem Relation Age of Onset    Asthma Father     Asthma Maternal Grandmother     Asthma Maternal Grandfather     Kidney Disease Sister         frequent UTI's         History of completed suicide:denies    Allergies:    Allergies   Allergen Reactions    Adhesive Tape      SKIN IRRITATION    Zithromax [Azithromycin Dihydrate] Hives    Morphine Nausea And Vomiting and Other (See Comments)     H/A         Current Medications:     Current Outpatient Prescriptions on File Prior to Visit   Medication Sig Dispense Refill    ibuprofen (ADVIL;MOTRIN) 800 MG tablet Take 1 tablet by mouth every 8 hours as needed for Pain 60 tablet 1    docusate sodium (COLACE, DULCOLAX) 100 MG CAPS Take 100 mg by mouth 2 times daily 60 capsule 1    Prenatal Multivit-Min-Fe-FA (PRENATAL VITAMINS) 0.8 MG TABS Take 1 tablet by mouth daily 90 tablet 3     No current facility-administered medications on file prior to visit. Controlled substances monitoring: requested oaars report, not available. OBJECTIVE:  Vitals:   Vitals:    08/28/18 0900   BP: 112/78   Pulse: 70     Wt Readings from Last 3 Encounters:   08/28/18 139 lb 6.4 oz (63.2 kg)   08/02/18 140 lb (63.5 kg)   06/06/18 154 lb (69.9 kg)           ROS: Denies trouble with fever, rash, headache, vision changes, chest pain, shortness of breath, nausea, extremity pain, weakness, dysuria.      Mental Status Exam:     Appearance    alert, cooperative, appropriate dress for season in work uniform, well groomed, appears stated age  Muscle strength/tone: no atrophy or abnormal movements  Gait/station: normal  Speech    spontaneous, normal rate, normal volume and well articulated  Mood    Anxious  Guilty  Depressed  Low self-esteem  Irritability  Panic attacks  Affect    depressed affect Congruent to thought content and mood  Thought Content    hopelessness, worthlessness, excessive guilt, excessive preoccupations, intrusive thoughts, paranoid thoughts, cognitive distortions and all or nothing thinking, no delusions voiced  Thought Process    coherent   Associations    logical connections  Perceptions: denies AH/VH, does not appear preoccupied with the internal environment  Insight    Fair  Judgment    fair  Orientation    oriented to person, place, time, and general circumstances  Memory    recent and remote memory intact  Attention/Concentration    intact  Ability to understand instructions Yes  Ability to respond meaningfully Yes  Language: [address] [place] [address] of knowledge/Intellect: Average  SI:   suicidal ideation with no plan or intent  HI: Denies HI    Labs:   Lab Review   Admission on 06/06/2018, Discharged on 06/08/2018   Component Date Value    Color, UA 06/06/2018 YELLOW     Clarity, UA 06/06/2018 CLOUDY*  Glucose, Ur 06/06/2018 Negative     Bilirubin Urine 06/06/2018 Negative     Ketones, Urine 06/06/2018 Negative     Specific Gravity, UA 06/06/2018 1.019     Blood, Urine 06/06/2018 Negative     pH, UA 06/06/2018 6.5     Protein, UA 06/06/2018 30*    Urobilinogen, Urine 06/06/2018 0.2     Nitrite, Urine 06/06/2018 Negative     Leukocyte Esterase, Urine 06/06/2018 Negative     Microscopic Examination 06/06/2018 YES     Urine Type 06/06/2018 Not Specified     Bacteria, UA 06/06/2018 1+*    Hyaline Casts, UA 06/06/2018 2     WBC, UA 06/06/2018 2     RBC, UA 06/06/2018 2     Epi Cells 06/06/2018 4     WBC 06/06/2018 10.0     RBC 06/06/2018 4.66     Hemoglobin 06/06/2018 10.7*    Hematocrit 06/06/2018 32.1*    MCV 06/06/2018 69.0*    MCH 06/06/2018 22.9*    MCHC 06/06/2018 33.2     RDW 06/06/2018 15.6*    Platelets 31/28/2724 255     MPV 06/06/2018 8.7     Path Consult 06/06/2018 Yes     RPR 06/06/2018 Non-reactive     ABO/Rh 06/06/2018 O POS     Antibody Screen 06/06/2018 NEG     Amphetamine Screen, Urine 06/06/2018 Neg     Barbiturate Screen, Ur 06/06/2018 Neg     Benzodiazepine Screen, U* 06/06/2018 Neg     Cannabinoid Scrn, Ur 06/06/2018 Neg     Cocaine Metabolite Scree* 06/06/2018 Neg     Opiate Scrn, Ur 06/06/2018 Neg     PCP Screen, Urine 06/06/2018 Neg     Methadone Screen, Urine 06/06/2018 Neg     Propoxyphene Scrn, Ur 06/06/2018 Neg     Oxycodone Urine 06/06/2018 Neg     Buprenorphine Urine 06/06/2018 Neg     pH, UA 06/06/2018 6.0     Drug Screen Comment: 06/06/2018 see below     Hep B S Ag Interp 11/22/2017 negative     HIV-1/HIV-2 Ab 11/22/2017 nonreactive     Rubella Antibody IgG 11/22/2017 immune      Path Consult 06/06/2018 Reviewed    Admission on 04/29/2018, Discharged on 04/29/2018   Component Date Value    Color, UA 04/29/2018 YELLOW     Clarity, UA 04/29/2018 Clear     Glucose, Ur 04/29/2018 Negative     Bilirubin Urine 04/29/2018 Negative     [place] [address]: [place], [facility] laurakrishan, [address], Intake/Walk in hours are Monday through Friday between 8:00 am & 12:00 pm. 2nd Floor. [address], [address] Office Location [id number] in photo Id, proof of health coverage if any, and proof of income. 110 [facility] [address] [facility], 7101 [facility] Offices [facility], 2620 [facility] Office [address] [place], 707 [facility], [address] Office  [address], [address]   [facility] [address], [facility] [address], [address] House/[place] [address]   [phone] Case Management   Mental Health Prevention   Substance Abuse Therapy  Adin Torres 1527 Assistance   [facility] Lam, [facility], & [facility]. [facility], Clinical counseling and assessment are provided to assist individuals.  Individual/Group Therapy    Couples/Family Therapy   Psychological Testing   Case Management [address] Residents)   Psychiatric Medication Services  You must call [phone]. You must have Medicaid. [address], [address]. [address]  [phone] 645-CARE ([phone])    National Suicide Prevention Lifeline  (082) 300-TALK (8750)  www.suicidepreventionlifeline. [place] of [address] Bridget Miller (PES): [address]  1305 [facility], [address]      Text Support  Text 371589 \"connect\" if suicidal for contact via text or phone call       Suicide Hotline   (819) 683-6879

## 2018-08-28 NOTE — PATIENT INSTRUCTIONS
Counseling Services     [facility]  Phone: [phone]  Locations:   [facility], [place] [address] CHEPE PAEZ: Martinaeencasey. [address] [facility] [address], 2900 [facility] Tommie [address] [facility] [address]: 10 [facility], [address]: [address], [address] [facility] [address], [place] [address], Intake/Walk in hours are Monday through Friday between 8:00 am & 12:00 pm. 2nd Floor. [address], [address] Office Location [phone]  · Bring in photo Id, proof of health coverage if any, and proof of income. · [address] [facility], [address]  · 450 [facility], [address]  · [address] [place] [address]  · [address], [address]  · [address] [facility], [address]  · [address] [place], [address] House/Center [address], [address]   [phone]) 136-CARE (3394)  · Case Management  · Mental Health Prevention  · Substance Abuse Therapy  · Housing Assistance  · Jackquline Maroon, Frankey Arms, & [address] [facility] [address], Clinical counseling and assessment are provided to assist individuals. · Individual/Group Therapy   · Couples/Family Therapy  · Psychological Testing  · Case Management [facility])  · Psychiatric Medication Services  You must call [phone]. You must have Medicaid. [address], [address] [facility] [address]  (041) 852-CARE (6352)     National Suicide Prevention Lifeline  (268) 596-TALK (0389)  www.suicidepreventionlifeline. Psychiatric Aaron (PES): 835.621.3929  [facility] [address] [place] [address]        Text Support  Text 677237 \"connect\" if suicidal for contact via text or phone call         Suicide Hotline   (995) 647-6502

## 2018-08-29 LAB
ESTIMATED AVERAGE GLUCOSE: 99.7 MG/DL
HBA1C MFR BLD: 5.1 %

## 2018-09-07 ENCOUNTER — TELEPHONE (OUTPATIENT)
Dept: INTERNAL MEDICINE CLINIC | Age: 23
End: 2018-09-07

## 2018-09-07 NOTE — TELEPHONE ENCOUNTER
Pt asking if John Martinez would be willing to order the morning after pill?      PA#[phone]    Pharmacy:  [facility] [address], [address] [place] [address] [place] [address]

## 2018-09-26 PROBLEM — R52 PAIN: Status: RESOLVED | Noted: 2017-02-15 | Resolved: 2018-09-26

## 2018-10-25 ENCOUNTER — OFFICE VISIT (OUTPATIENT)
Dept: PSYCHIATRY | Age: 23
End: 2018-10-25
Payer: MEDICARE

## 2018-10-25 VITALS
HEIGHT: 61 IN | BODY MASS INDEX: 25.79 KG/M2 | DIASTOLIC BLOOD PRESSURE: 76 MMHG | SYSTOLIC BLOOD PRESSURE: 120 MMHG | WEIGHT: 136.6 LBS | HEART RATE: 76 BPM

## 2018-10-25 DIAGNOSIS — F33.2 SEVERE EPISODE OF RECURRENT MAJOR DEPRESSIVE DISORDER, WITHOUT PSYCHOTIC FEATURES (HCC): Primary | ICD-10-CM

## 2018-10-25 DIAGNOSIS — F41.1 GAD (GENERALIZED ANXIETY DISORDER): ICD-10-CM

## 2018-10-25 PROCEDURE — 99214 OFFICE O/P EST MOD 30 MIN: CPT | Performed by: NURSE PRACTITIONER

## 2018-10-25 PROCEDURE — G8417 CALC BMI ABV UP PARAM F/U: HCPCS | Performed by: NURSE PRACTITIONER

## 2018-10-25 PROCEDURE — G8482 FLU IMMUNIZE ORDER/ADMIN: HCPCS | Performed by: NURSE PRACTITIONER

## 2018-10-25 PROCEDURE — 1036F TOBACCO NON-USER: CPT | Performed by: NURSE PRACTITIONER

## 2018-10-25 PROCEDURE — G8427 DOCREV CUR MEDS BY ELIG CLIN: HCPCS | Performed by: NURSE PRACTITIONER

## 2018-10-25 RX ORDER — FLUOXETINE HYDROCHLORIDE 20 MG/1
20 CAPSULE ORAL DAILY
Qty: 30 CAPSULE | Refills: 3 | Status: SHIPPED | OUTPATIENT
Start: 2018-10-25 | End: 2019-07-08

## 2018-10-25 ASSESSMENT — PATIENT HEALTH QUESTIONNAIRE - PHQ9
1. LITTLE INTEREST OR PLEASURE IN DOING THINGS: 2
5. POOR APPETITE OR OVEREATING: 3
2. FEELING DOWN, DEPRESSED OR HOPELESS: 2
8. MOVING OR SPEAKING SO SLOWLY THAT OTHER PEOPLE COULD HAVE NOTICED. OR THE OPPOSITE, BEING SO FIGETY OR RESTLESS THAT YOU HAVE BEEN MOVING AROUND A LOT MORE THAN USUAL: 2
9. THOUGHTS THAT YOU WOULD BE BETTER OFF DEAD, OR OF HURTING YOURSELF: 1
SUM OF ALL RESPONSES TO PHQ QUESTIONS 1-9: 18
SUM OF ALL RESPONSES TO PHQ QUESTIONS 1-9: 18
SUM OF ALL RESPONSES TO PHQ9 QUESTIONS 1 & 2: 4
3. TROUBLE FALLING OR STAYING ASLEEP: 3
4. FEELING TIRED OR HAVING LITTLE ENERGY: 2
7. TROUBLE CONCENTRATING ON THINGS, SUCH AS READING THE NEWSPAPER OR WATCHING TELEVISION: 2
6. FEELING BAD ABOUT YOURSELF - OR THAT YOU ARE A FAILURE OR HAVE LET YOURSELF OR YOUR FAMILY DOWN: 1
10. IF YOU CHECKED OFF ANY PROBLEMS, HOW DIFFICULT HAVE THESE PROBLEMS MADE IT FOR YOU TO DO YOUR WORK, TAKE CARE OF THINGS AT HOME, OR GET ALONG WITH OTHER PEOPLE: 2

## 2018-10-25 ASSESSMENT — ANXIETY QUESTIONNAIRES
7. FEELING AFRAID AS IF SOMETHING AWFUL MIGHT HAPPEN: 3-NEARLY EVERY DAY
6. BECOMING EASILY ANNOYED OR IRRITABLE: 3-NEARLY EVERY DAY
5. BEING SO RESTLESS THAT IT IS HARD TO SIT STILL: 0-NOT AT ALL SURE
2. NOT BEING ABLE TO STOP OR CONTROL WORRYING: 3-NEARLY EVERY DAY
3. WORRYING TOO MUCH ABOUT DIFFERENT THINGS: 3-NEARLY EVERY DAY
GAD7 TOTAL SCORE: 18
4. TROUBLE RELAXING: 3-NEARLY EVERY DAY
1. FEELING NERVOUS, ANXIOUS, OR ON EDGE: 3-NEARLY EVERY DAY

## 2018-10-25 NOTE — PROGRESS NOTES
08/28/2018 16.1*    Platelets 92/31/6296 367     MPV 08/28/2018 8.0     Neutrophils % 08/28/2018 68.7     Lymphocytes % 08/28/2018 22.9     Monocytes % 08/28/2018 6.8     Eosinophils % 08/28/2018 1.3     Basophils % 08/28/2018 0.3     Neutrophils # 08/28/2018 5.0     Lymphocytes # 08/28/2018 1.7     Monocytes # 08/28/2018 0.5     Eosinophils # 08/28/2018 0.1     Basophils # 08/28/2018 0.0     Hemoglobin A1C 08/28/2018 5.1     eAG 08/28/2018 99.7     Sodium 08/28/2018 141     Potassium 08/28/2018 4.2     Chloride 08/28/2018 103     CO2 08/28/2018 23     Anion Gap 08/28/2018 15     Glucose 08/28/2018 90     BUN 08/28/2018 6*    CREATININE 08/28/2018 0.7     GFR Non- 08/28/2018 >60     GFR  08/28/2018 >60     Calcium 08/28/2018 9.5     Total Protein 08/28/2018 8.2     Alb 08/28/2018 4.8     Albumin/Globulin Ratio 08/28/2018 1.4     Total Bilirubin 08/28/2018 0.3     Alkaline Phosphatase 08/28/2018 93     ALT 08/28/2018 30     AST 08/28/2018 22     Globulin 08/28/2018 3.4     Amphetamine Screen, Urine 08/28/2018 Neg     Barbiturate Screen, Ur 08/28/2018 Neg     Benzodiazepine Screen, U* 08/28/2018 Neg     Cannabinoid Scrn, Ur 08/28/2018 Neg     Cocaine Metabolite Scree* 08/28/2018 Neg     Opiate Scrn, Ur 08/28/2018 Neg     PCP Screen, Urine 08/28/2018 Neg     Methadone Screen, Urine 08/28/2018 Neg     Propoxyphene Scrn, Ur 08/28/2018 Neg     pH, UA 08/28/2018 6.0     Drug Screen Comment: 08/28/2018 see below     Oxycodone Urine 08/28/2018 Neg     TSH Reflex FT4 08/28/2018 1.24     Vit D, 25-Hydroxy 08/28/2018 22.4*    Cholesterol, Total 08/28/2018 129     Triglycerides 08/28/2018 92     HDL 08/28/2018 38*    LDL Calculated 08/28/2018 73     VLDL Cholesterol Calcula* 08/28/2018 18    Office Visit on 08/28/2018   Component Date Value    HCG(Urine) Pregnancy Test 08/28/2018 Negative    Admission on 06/06/2018, Discharged on 06/08/2018

## 2018-11-12 ENCOUNTER — HOSPITAL ENCOUNTER (EMERGENCY)
Age: 23
Discharge: HOME | End: 2018-11-12
Attending: EMERGENCY MEDICINE
Payer: MEDICARE

## 2018-11-12 VITALS
HEIGHT: 61 IN | WEIGHT: 136 LBS | OXYGEN SATURATION: 99 % | DIASTOLIC BLOOD PRESSURE: 75 MMHG | HEART RATE: 88 BPM | SYSTOLIC BLOOD PRESSURE: 121 MMHG | BODY MASS INDEX: 25.68 KG/M2 | TEMPERATURE: 97.8 F | RESPIRATION RATE: 15 BRPM

## 2018-11-12 DIAGNOSIS — N39.0 URINARY TRACT INFECTION WITHOUT HEMATURIA, SITE UNSPECIFIED: Primary | ICD-10-CM

## 2018-11-12 LAB
BACTERIA: ABNORMAL /HPF
BILIRUBIN URINE: NEGATIVE MG/DL
BLOOD, URINE: NEGATIVE
CLARITY: ABNORMAL
COLOR: ABNORMAL
GLUCOSE URINE: NEGATIVE MG/DL
KETONES, URINE: NEGATIVE MG/DL
LEUKOCYTE ESTERASE, URINE: ABNORMAL
MICROSCOPIC EXAMINATION: YES
NITRITE, URINE: NEGATIVE
PH UA: 6.5
PROTEIN UA: NEGATIVE MG/DL
RBC UA: ABNORMAL /HPF (ref 0–2)
RENAL EPITHELIAL, UA: ABNORMAL /HPF
SPECIFIC GRAVITY UA: 1.02
UROBILINOGEN, URINE: 0.2 E.U./DL
WBC UA: ABNORMAL /HPF (ref 0–5)

## 2018-11-12 PROCEDURE — 84703 CHORIONIC GONADOTROPIN ASSAY: CPT

## 2018-11-12 PROCEDURE — 87086 URINE CULTURE/COLONY COUNT: CPT

## 2018-11-12 PROCEDURE — 81001 URINALYSIS AUTO W/SCOPE: CPT

## 2018-11-12 PROCEDURE — 6360000002 HC RX W HCPCS: Performed by: NURSE PRACTITIONER

## 2018-11-12 PROCEDURE — 99284 EMERGENCY DEPT VISIT MOD MDM: CPT

## 2018-11-12 PROCEDURE — 96374 THER/PROPH/DIAG INJ IV PUSH: CPT

## 2018-11-12 PROCEDURE — 87077 CULTURE AEROBIC IDENTIFY: CPT

## 2018-11-12 PROCEDURE — 87186 SC STD MICRODIL/AGAR DIL: CPT

## 2018-11-12 PROCEDURE — 6370000000 HC RX 637 (ALT 250 FOR IP): Performed by: NURSE PRACTITIONER

## 2018-11-12 RX ORDER — CEPHALEXIN 500 MG/1
500 CAPSULE ORAL 2 TIMES DAILY
Qty: 14 CAPSULE | Refills: 0 | Status: SHIPPED | OUTPATIENT
Start: 2018-11-12 | End: 2018-11-19

## 2018-11-12 RX ORDER — CEPHALEXIN 500 MG/1
500 CAPSULE ORAL ONCE
Status: COMPLETED | OUTPATIENT
Start: 2018-11-12 | End: 2018-11-12

## 2018-11-12 RX ORDER — KETOROLAC TROMETHAMINE 30 MG/ML
15 INJECTION, SOLUTION INTRAMUSCULAR; INTRAVENOUS ONCE
Status: COMPLETED | OUTPATIENT
Start: 2018-11-12 | End: 2018-11-12

## 2018-11-12 RX ADMIN — CEPHALEXIN 500 MG: 500 CAPSULE ORAL at 19:30

## 2018-11-12 RX ADMIN — KETOROLAC TROMETHAMINE 15 MG: 30 INJECTION, SOLUTION INTRAMUSCULAR at 17:51

## 2018-11-12 ASSESSMENT — PAIN DESCRIPTION - PAIN TYPE: TYPE: ACUTE PAIN

## 2018-11-12 ASSESSMENT — PAIN DESCRIPTION - ORIENTATION: ORIENTATION: LEFT

## 2018-11-12 ASSESSMENT — PAIN SCALES - GENERAL: PAINLEVEL_OUTOF10: 5

## 2018-11-12 ASSESSMENT — PAIN DESCRIPTION - LOCATION: LOCATION: FLANK

## 2018-11-12 NOTE — ED PROVIDER NOTES
UTI with keflex. We will send urine culture. She will follow up with PCP. She is well appearing and PO tolerant. At this point in time, patient is stable for discharge. Patient was given strict return precautions as outlined in the AVS. Patient was agreeable and understanding to this plan of care. Prior to discharge, patient was ambulatory and PO tolerant. This patient was also evaluated by the attending physician, Dr. Payal Mejia. . All care plans were discussed and agreed upon. Clinical Impression     1. Urinary tract infection without hematuria, site unspecified        Disposition     DC    DISCHARGE MEDICATIONS:  Discharge Medication List as of 11/12/2018  7:32 PM      START taking these medications    Details   cephALEXin (KEFLEX) 500 MG capsule Take 1 capsule by mouth 2 times daily for 7 days, Disp-14 capsule, R-0Print             PATIENT REFERRED TO:  No follow-up provider specified.     Shen Flynn CNP  Department of Emergency Medicine     AMMY Melissa CNP  11/13/18 0000

## 2018-11-13 LAB — PREGNANCY, URINE: NEGATIVE

## 2018-11-13 ASSESSMENT — ENCOUNTER SYMPTOMS
VOICE CHANGE: 0
ABDOMINAL PAIN: 0
NAUSEA: 0
SHORTNESS OF BREATH: 0
FACIAL SWELLING: 0
EYES NEGATIVE: 1
VOMITING: 0
CHEST TIGHTNESS: 0
DIARRHEA: 0
COUGH: 0

## 2018-11-15 LAB
ORGANISM: ABNORMAL
URINE CULTURE, ROUTINE: ABNORMAL
URINE CULTURE, ROUTINE: ABNORMAL

## 2018-12-09 ENCOUNTER — HOSPITAL ENCOUNTER (EMERGENCY)
Age: 23
Discharge: HOME | End: 2018-12-09
Payer: MEDICARE

## 2018-12-09 ENCOUNTER — APPOINTMENT (OUTPATIENT)
Dept: CT IMAGING | Age: 23
End: 2018-12-09
Payer: MEDICARE

## 2018-12-09 VITALS
HEART RATE: 103 BPM | SYSTOLIC BLOOD PRESSURE: 121 MMHG | BODY MASS INDEX: 26.09 KG/M2 | OXYGEN SATURATION: 99 % | HEIGHT: 62 IN | TEMPERATURE: 98.5 F | RESPIRATION RATE: 20 BRPM | DIASTOLIC BLOOD PRESSURE: 64 MMHG | WEIGHT: 141.8 LBS

## 2018-12-09 DIAGNOSIS — N39.0 ACUTE UTI: Primary | ICD-10-CM

## 2018-12-09 LAB
A/G RATIO: 1.1 (ref 1.1–2.2)
ALBUMIN SERPL-MCNC: 4.7 G/DL (ref 3.4–5)
ALP BLD-CCNC: 93 U/L (ref 40–129)
ALT SERPL-CCNC: 23 U/L (ref 10–40)
ANION GAP SERPL CALCULATED.3IONS-SCNC: 17 MMOL/L (ref 3–16)
AST SERPL-CCNC: 25 U/L (ref 15–37)
BASOPHILS ABSOLUTE: 0 K/UL (ref 0–0.2)
BASOPHILS RELATIVE PERCENT: 0.2 %
BILIRUB SERPL-MCNC: 0.7 MG/DL (ref 0–1)
BILIRUBIN URINE: NEGATIVE
BLOOD, URINE: ABNORMAL
BUN BLDV-MCNC: 11 MG/DL (ref 7–20)
CALCIUM SERPL-MCNC: 9.9 MG/DL (ref 8.3–10.6)
CASTS: ABNORMAL /LPF
CHLORIDE BLD-SCNC: 106 MMOL/L (ref 99–110)
CLARITY: ABNORMAL
CO2: 15 MMOL/L (ref 21–32)
COLOR: ABNORMAL
CREAT SERPL-MCNC: 0.8 MG/DL (ref 0.6–1.1)
EKG ATRIAL RATE: 129 BPM
EKG DIAGNOSIS: NORMAL
EKG P AXIS: 71 DEGREES
EKG P-R INTERVAL: 132 MS
EKG Q-T INTERVAL: 296 MS
EKG QRS DURATION: 72 MS
EKG QTC CALCULATION (BAZETT): 433 MS
EKG R AXIS: 77 DEGREES
EKG T AXIS: 11 DEGREES
EKG VENTRICULAR RATE: 129 BPM
EOSINOPHILS ABSOLUTE: 0 K/UL (ref 0–0.6)
EOSINOPHILS RELATIVE PERCENT: 0.1 %
EPITHELIAL CELLS, UA: 5 /HPF (ref 0–5)
GFR AFRICAN AMERICAN: >60
GFR NON-AFRICAN AMERICAN: >60
GLOBULIN: 4.3 G/DL
GLUCOSE BLD-MCNC: 132 MG/DL (ref 70–99)
GLUCOSE URINE: NEGATIVE MG/DL
HCG(URINE) PREGNANCY TEST: NEGATIVE
HCT VFR BLD CALC: 41.2 % (ref 36–48)
HEMOGLOBIN: 13.3 G/DL (ref 12–16)
KETONES, URINE: NEGATIVE MG/DL
LACTIC ACID: 1.2 MMOL/L (ref 0.4–2)
LACTIC ACID: 2.1 MMOL/L (ref 0.4–2)
LEUKOCYTE ESTERASE, URINE: ABNORMAL
LYMPHOCYTES ABSOLUTE: 0.8 K/UL (ref 1–5.1)
LYMPHOCYTES RELATIVE PERCENT: 7.9 %
MCH RBC QN AUTO: 23.3 PG (ref 26–34)
MCHC RBC AUTO-ENTMCNC: 32.2 G/DL (ref 31–36)
MCV RBC AUTO: 72.4 FL (ref 80–100)
MICROSCOPIC EXAMINATION: YES
MONOCYTES ABSOLUTE: 0.4 K/UL (ref 0–1.3)
MONOCYTES RELATIVE PERCENT: 4.3 %
NEUTROPHILS ABSOLUTE: 8.9 K/UL (ref 1.7–7.7)
NEUTROPHILS RELATIVE PERCENT: 87.5 %
NITRITE, URINE: NEGATIVE
PDW BLD-RTO: 17 % (ref 12.4–15.4)
PH UA: 6
PLATELET # BLD: 258 K/UL (ref 135–450)
PMV BLD AUTO: 9.3 FL (ref 5–10.5)
POTASSIUM REFLEX MAGNESIUM: 4.8 MMOL/L (ref 3.5–5.1)
PROTEIN UA: 30 MG/DL
RBC # BLD: 5.69 M/UL (ref 4–5.2)
RBC UA: 2 /HPF (ref 0–4)
SODIUM BLD-SCNC: 138 MMOL/L (ref 136–145)
SPECIFIC GRAVITY UA: 1.03
TOTAL PROTEIN: 9 G/DL (ref 6.4–8.2)
URINE REFLEX TO CULTURE: YES
URINE TYPE: ABNORMAL
UROBILINOGEN, URINE: 0.2 E.U./DL
WBC # BLD: 10.2 K/UL (ref 4–11)
WBC UA: 8 /HPF (ref 0–5)

## 2018-12-09 PROCEDURE — 96375 TX/PRO/DX INJ NEW DRUG ADDON: CPT

## 2018-12-09 PROCEDURE — 83605 ASSAY OF LACTIC ACID: CPT

## 2018-12-09 PROCEDURE — 87040 BLOOD CULTURE FOR BACTERIA: CPT

## 2018-12-09 PROCEDURE — 93005 ELECTROCARDIOGRAM TRACING: CPT | Performed by: EMERGENCY MEDICINE

## 2018-12-09 PROCEDURE — 81001 URINALYSIS AUTO W/SCOPE: CPT

## 2018-12-09 PROCEDURE — 6360000002 HC RX W HCPCS

## 2018-12-09 PROCEDURE — 96374 THER/PROPH/DIAG INJ IV PUSH: CPT

## 2018-12-09 PROCEDURE — 87086 URINE CULTURE/COLONY COUNT: CPT

## 2018-12-09 PROCEDURE — 84703 CHORIONIC GONADOTROPIN ASSAY: CPT

## 2018-12-09 PROCEDURE — 74177 CT ABD & PELVIS W/CONTRAST: CPT

## 2018-12-09 PROCEDURE — 36415 COLL VENOUS BLD VENIPUNCTURE: CPT

## 2018-12-09 PROCEDURE — 85025 COMPLETE CBC W/AUTO DIFF WBC: CPT

## 2018-12-09 PROCEDURE — 6360000002 HC RX W HCPCS: Performed by: PHYSICIAN ASSISTANT

## 2018-12-09 PROCEDURE — 2580000003 HC RX 258

## 2018-12-09 PROCEDURE — 93010 ELECTROCARDIOGRAM REPORT: CPT | Performed by: INTERNAL MEDICINE

## 2018-12-09 PROCEDURE — 6360000004 HC RX CONTRAST MEDICATION: Performed by: PHYSICIAN ASSISTANT

## 2018-12-09 PROCEDURE — 80053 COMPREHEN METABOLIC PANEL: CPT

## 2018-12-09 PROCEDURE — 99284 EMERGENCY DEPT VISIT MOD MDM: CPT

## 2018-12-09 RX ORDER — ONDANSETRON 2 MG/ML
INJECTION INTRAMUSCULAR; INTRAVENOUS
Status: COMPLETED
Start: 2018-12-09 | End: 2018-12-09

## 2018-12-09 RX ORDER — SODIUM CHLORIDE 9 MG/ML
INJECTION, SOLUTION INTRAVENOUS
Status: COMPLETED
Start: 2018-12-09 | End: 2018-12-09

## 2018-12-09 RX ORDER — SULFAMETHOXAZOLE AND TRIMETHOPRIM 800; 160 MG/1; MG/1
1 TABLET ORAL 2 TIMES DAILY
Qty: 14 TABLET | Refills: 0 | Status: SHIPPED | OUTPATIENT
Start: 2018-12-09 | End: 2018-12-16

## 2018-12-09 RX ORDER — ONDANSETRON 4 MG/1
4 TABLET, ORALLY DISINTEGRATING ORAL EVERY 8 HOURS PRN
Qty: 15 TABLET | Refills: 0 | Status: SHIPPED | OUTPATIENT
Start: 2018-12-09 | End: 2019-07-08

## 2018-12-09 RX ORDER — 0.9 % SODIUM CHLORIDE 0.9 %
1000 INTRAVENOUS SOLUTION INTRAVENOUS ONCE
Status: COMPLETED | OUTPATIENT
Start: 2018-12-09 | End: 2018-12-09

## 2018-12-09 RX ORDER — KETOROLAC TROMETHAMINE 30 MG/ML
30 INJECTION, SOLUTION INTRAMUSCULAR; INTRAVENOUS ONCE
Status: COMPLETED | OUTPATIENT
Start: 2018-12-09 | End: 2018-12-09

## 2018-12-09 RX ORDER — ONDANSETRON 2 MG/ML
4 INJECTION INTRAMUSCULAR; INTRAVENOUS ONCE
Status: COMPLETED | OUTPATIENT
Start: 2018-12-09 | End: 2018-12-09

## 2018-12-09 RX ORDER — PHENAZOPYRIDINE HYDROCHLORIDE 100 MG/1
100 TABLET, FILM COATED ORAL 3 TIMES DAILY PRN
Qty: 15 TABLET | Refills: 0 | Status: SHIPPED | OUTPATIENT
Start: 2018-12-09 | End: 2019-07-08

## 2018-12-09 RX ADMIN — IOPAMIDOL 75 ML: 755 INJECTION, SOLUTION INTRAVENOUS at 07:20

## 2018-12-09 RX ADMIN — SODIUM CHLORIDE 1000 ML: 9 INJECTION, SOLUTION INTRAVENOUS at 06:23

## 2018-12-09 RX ADMIN — KETOROLAC TROMETHAMINE 30 MG: 30 INJECTION, SOLUTION INTRAMUSCULAR at 06:23

## 2018-12-09 RX ADMIN — ONDANSETRON 4 MG: 2 INJECTION INTRAMUSCULAR; INTRAVENOUS at 06:23

## 2018-12-09 RX ADMIN — Medication 1000 ML: at 06:23

## 2018-12-09 RX ADMIN — ONDANSETRON HYDROCHLORIDE 4 MG: 2 INJECTION, SOLUTION INTRAMUSCULAR; INTRAVENOUS at 06:23

## 2018-12-09 ASSESSMENT — PAIN SCALES - GENERAL: PAINLEVEL_OUTOF10: 7

## 2018-12-09 NOTE — ED PROVIDER NOTES
EKG provided shows a sinus tachycardia with nonspecific ST-T wave changes otherwise no acute ST elevation isolated T wave inversion is seen in lead 3 without reciprocal changes. This is unchanged from prior EKG January 2018 sinus tachycardia rate of 129 .        Christopher Sousa DO  12/09/18 9034

## 2018-12-09 NOTE — ED NOTES
-IV placed per MD order, labs drawn from site per departmental policy. 20G placed Right arm.  -Needle-less high pressure extension placed on line.  -Tubes of blood obtained after verifying patient with name, , and medical record number, using scanning device and pt verification.  -Specimens sent to lab via bio-hazard bag at this time.  -Patient tolerated well and left on stretcher locked in lowest position with side rails up and call light within reach.  -Pt verbalizes understanding of use of call light.      Suhail De Leon RN  [phone]

## 2018-12-09 NOTE — ED PROVIDER NOTES
docusate sodium (COLACE, DULCOLAX) 100 MG CAPS Take 100 mg by mouth 2 times daily 60 capsule 1    Prenatal Multivit-Min-Fe-FA (PRENATAL VITAMINS) 0.8 MG TABS Take 1 tablet by mouth daily 90 tablet 3       ALLERGIES    Allergies   Allergen Reactions    Adhesive Tape      SKIN IRRITATION    Zithromax [Azithromycin Dihydrate] Hives    Morphine Nausea And Vomiting and Other (See Comments)     H/A       FAMILY HISTORY/SOCIAL HISTORY    Family History   Problem Relation Age of Onset    Asthma Father     Asthma Maternal Grandmother     Asthma Maternal Grandfather     Kidney Disease Sister         frequent UTI's       Social History     Social History    Marital status: Single     Spouse name: N/A    Number of children: N/A    Years of education: N/A     Social History Main Topics    Smoking status: Never Smoker    Smokeless tobacco: Never Used    Alcohol use Yes      Comment: socially     Drug use: No    Sexual activity: Yes     Partners: Male     Other Topics Concern    None     Social History Narrative    None       Nursing Notes Reviewed    Physical Exam:  ED Triage Vitals [12/09/18 0550]   Enc Vitals Group      BP (!) 128/51      Pulse 115      Resp 17      Temp 97 °F (36.1 °C)      Temp Source Oral      SpO2 100 %      Weight 141 lb 12.8 oz (64.3 kg)      Height 5' 1.5\" (1.562 m)      Head Circumference       Peak Flow       Pain Score       Pain Loc       Pain Edu? Excl. in [address]? GENERAL APPEARANCE: Awake and alert. Cooperative. No acute distress. HEAD: Normocephalic. Atraumatic. EYES: EOM's grossly intact. Sclera anicteric. PERRLA  ENT: Mucous membranes are moist. Tolerates saliva. No trismus. Normal nose, patent airway, nonerythematous TMs and EACs  NECK: Supple. No meningismus. Trachea midline. HEART: Tachycardic. Radial pulses 2+. LUNGS: Respirations unlabored. CTAB  ABDOMEN: Soft. Suprapubic tenderness to palpation. No guarding or rebound.  NBS  : No CVA tenderness  EXTREMITIES: return if new or worsening symptoms develop. She voiced understanding and is in agreement with this plan. I estimate there is LOW risk for ACUTE APPENDICITIS, BOWEL OBSTRUCTION, CHOLECYSTITIS, RUPTURED DIVERTICULITIS, INCARCERATED HERNIA, HEMMORHAGIC PANCREATITIS, PERFORATED BOWEL/ULCER, ECTOPIC PREGNANCY, OVARIAN TORSION or TUBO-OVARIAN ABSCESS thus I consider the discharge disposition reasonable. Also, there is no evidence or peritonitis, sepsis, or toxicity. Julianna Brittle (or their surrogate) and I have discussed the diagnosis and risks, and we agree with discharging home with close follow-up. We also discussed returning to the Emergency Department immediately if new or worsening symptoms occur. We have discussed the symptoms which are most concerning that necessitate immediate return. Clinical Impression:  1.  Acute UTI        DISCHARGE MEDICATIONS:  New Prescriptions    ONDANSETRON (ZOFRAN ODT) 4 MG DISINTEGRATING TABLET    Take 1 tablet by mouth every 8 hours as needed for Nausea or Vomiting    PHENAZOPYRIDINE (PYRIDIUM) 100 MG TABLET    Take 1 tablet by mouth 3 times daily as needed for Pain (dysuria)    SULFAMETHOXAZOLE-TRIMETHOPRIM (BACTRIM DS) 800-160 MG PER TABLET    Take 1 tablet by mouth 2 times daily for 7 days       (Please note that portions of this note may have been completed with a voice recognition program. Efforts were made to edit the dictations but occasionally words are mis-transcribed.)         Craig Leon PA-C  12/09/18 7367

## 2018-12-10 LAB — URINE CULTURE, ROUTINE: NORMAL

## 2018-12-14 LAB
BLOOD CULTURE, ROUTINE: NORMAL
CULTURE, BLOOD 2: NORMAL

## 2019-01-03 ENCOUNTER — OFFICE VISIT (OUTPATIENT)
Dept: PSYCHIATRY | Age: 24
End: 2019-01-03
Payer: MEDICARE

## 2019-01-03 VITALS
BODY MASS INDEX: 26.02 KG/M2 | HEIGHT: 62 IN | DIASTOLIC BLOOD PRESSURE: 68 MMHG | WEIGHT: 141.4 LBS | SYSTOLIC BLOOD PRESSURE: 108 MMHG | HEART RATE: 60 BPM

## 2019-01-03 DIAGNOSIS — F33.2 SEVERE EPISODE OF RECURRENT MAJOR DEPRESSIVE DISORDER, WITHOUT PSYCHOTIC FEATURES (HCC): Primary | ICD-10-CM

## 2019-01-03 DIAGNOSIS — F41.1 GAD (GENERALIZED ANXIETY DISORDER): ICD-10-CM

## 2019-01-03 DIAGNOSIS — F12.10 CANNABIS ABUSE: ICD-10-CM

## 2019-01-03 PROCEDURE — 1036F TOBACCO NON-USER: CPT | Performed by: NURSE PRACTITIONER

## 2019-01-03 PROCEDURE — G8417 CALC BMI ABV UP PARAM F/U: HCPCS | Performed by: NURSE PRACTITIONER

## 2019-01-03 PROCEDURE — G8427 DOCREV CUR MEDS BY ELIG CLIN: HCPCS | Performed by: NURSE PRACTITIONER

## 2019-01-03 PROCEDURE — 99214 OFFICE O/P EST MOD 30 MIN: CPT | Performed by: NURSE PRACTITIONER

## 2019-01-03 PROCEDURE — G8482 FLU IMMUNIZE ORDER/ADMIN: HCPCS | Performed by: NURSE PRACTITIONER

## 2019-01-03 ASSESSMENT — PATIENT HEALTH QUESTIONNAIRE - PHQ9
7. TROUBLE CONCENTRATING ON THINGS, SUCH AS READING THE NEWSPAPER OR WATCHING TELEVISION: 3
SUM OF ALL RESPONSES TO PHQ9 QUESTIONS 1 & 2: 3
10. IF YOU CHECKED OFF ANY PROBLEMS, HOW DIFFICULT HAVE THESE PROBLEMS MADE IT FOR YOU TO DO YOUR WORK, TAKE CARE OF THINGS AT HOME, OR GET ALONG WITH OTHER PEOPLE: 0
6. FEELING BAD ABOUT YOURSELF - OR THAT YOU ARE A FAILURE OR HAVE LET YOURSELF OR YOUR FAMILY DOWN: 3
SUM OF ALL RESPONSES TO PHQ QUESTIONS 1-9: 21
2. FEELING DOWN, DEPRESSED OR HOPELESS: 1
SUM OF ALL RESPONSES TO PHQ QUESTIONS 1-9: 21
3. TROUBLE FALLING OR STAYING ASLEEP: 3
5. POOR APPETITE OR OVEREATING: 3
8. MOVING OR SPEAKING SO SLOWLY THAT OTHER PEOPLE COULD HAVE NOTICED. OR THE OPPOSITE, BEING SO FIGETY OR RESTLESS THAT YOU HAVE BEEN MOVING AROUND A LOT MORE THAN USUAL: 3
1. LITTLE INTEREST OR PLEASURE IN DOING THINGS: 2
4. FEELING TIRED OR HAVING LITTLE ENERGY: 3
9. THOUGHTS THAT YOU WOULD BE BETTER OFF DEAD, OR OF HURTING YOURSELF: 0

## 2019-01-03 ASSESSMENT — ANXIETY QUESTIONNAIRES
3. WORRYING TOO MUCH ABOUT DIFFERENT THINGS: 2-OVER HALF THE DAYS
GAD7 TOTAL SCORE: 17
2. NOT BEING ABLE TO STOP OR CONTROL WORRYING: 3-NEARLY EVERY DAY
7. FEELING AFRAID AS IF SOMETHING AWFUL MIGHT HAPPEN: 3-NEARLY EVERY DAY
4. TROUBLE RELAXING: 3-NEARLY EVERY DAY
1. FEELING NERVOUS, ANXIOUS, OR ON EDGE: 3-NEARLY EVERY DAY
6. BECOMING EASILY ANNOYED OR IRRITABLE: 3-NEARLY EVERY DAY
5. BEING SO RESTLESS THAT IT IS HARD TO SIT STILL: 0-NOT AT ALL SURE

## 2019-08-15 ENCOUNTER — OFFICE VISIT (OUTPATIENT)
Dept: PSYCHIATRY | Age: 24
End: 2019-08-15
Payer: MEDICARE

## 2019-08-15 VITALS — DIASTOLIC BLOOD PRESSURE: 62 MMHG | SYSTOLIC BLOOD PRESSURE: 94 MMHG

## 2019-08-15 DIAGNOSIS — F41.1 GAD (GENERALIZED ANXIETY DISORDER): Primary | ICD-10-CM

## 2019-08-15 DIAGNOSIS — F12.10 CANNABIS ABUSE: ICD-10-CM

## 2019-08-15 DIAGNOSIS — F33.2 SEVERE EPISODE OF RECURRENT MAJOR DEPRESSIVE DISORDER, WITHOUT PSYCHOTIC FEATURES (HCC): ICD-10-CM

## 2019-08-15 PROCEDURE — G8417 CALC BMI ABV UP PARAM F/U: HCPCS | Performed by: NURSE PRACTITIONER

## 2019-08-15 PROCEDURE — G8428 CUR MEDS NOT DOCUMENT: HCPCS | Performed by: NURSE PRACTITIONER

## 2019-08-15 PROCEDURE — 99214 OFFICE O/P EST MOD 30 MIN: CPT | Performed by: NURSE PRACTITIONER

## 2019-08-15 PROCEDURE — 1036F TOBACCO NON-USER: CPT | Performed by: NURSE PRACTITIONER

## 2019-08-15 RX ORDER — LORAZEPAM 0.5 MG/1
0.5 TABLET ORAL 2 TIMES DAILY PRN
Qty: 28 TABLET | Refills: 0 | Status: SHIPPED | OUTPATIENT
Start: 2019-08-15 | End: 2019-08-29

## 2019-08-15 RX ORDER — FLUOXETINE HYDROCHLORIDE 20 MG/1
20 CAPSULE ORAL DAILY
Qty: 30 CAPSULE | Refills: 3 | Status: SHIPPED | OUTPATIENT
Start: 2019-08-15 | End: 2020-07-10

## 2019-08-15 ASSESSMENT — ANXIETY QUESTIONNAIRES
2. NOT BEING ABLE TO STOP OR CONTROL WORRYING: 2-OVER HALF THE DAYS
5. BEING SO RESTLESS THAT IT IS HARD TO SIT STILL: 1-SEVERAL DAYS
3. WORRYING TOO MUCH ABOUT DIFFERENT THINGS: 3-NEARLY EVERY DAY
4. TROUBLE RELAXING: 2-OVER HALF THE DAYS
7. FEELING AFRAID AS IF SOMETHING AWFUL MIGHT HAPPEN: 3-NEARLY EVERY DAY
GAD7 TOTAL SCORE: 17
1. FEELING NERVOUS, ANXIOUS, OR ON EDGE: 3-NEARLY EVERY DAY
6. BECOMING EASILY ANNOYED OR IRRITABLE: 3-NEARLY EVERY DAY

## 2019-08-15 ASSESSMENT — PATIENT HEALTH QUESTIONNAIRE - PHQ9
9. THOUGHTS THAT YOU WOULD BE BETTER OFF DEAD, OR OF HURTING YOURSELF: 1
SUM OF ALL RESPONSES TO PHQ9 QUESTIONS 1 & 2: 4
5. POOR APPETITE OR OVEREATING: 3
2. FEELING DOWN, DEPRESSED OR HOPELESS: 2
4. FEELING TIRED OR HAVING LITTLE ENERGY: 3
6. FEELING BAD ABOUT YOURSELF - OR THAT YOU ARE A FAILURE OR HAVE LET YOURSELF OR YOUR FAMILY DOWN: 2
8. MOVING OR SPEAKING SO SLOWLY THAT OTHER PEOPLE COULD HAVE NOTICED. OR THE OPPOSITE, BEING SO FIGETY OR RESTLESS THAT YOU HAVE BEEN MOVING AROUND A LOT MORE THAN USUAL: 3
1. LITTLE INTEREST OR PLEASURE IN DOING THINGS: 2
10. IF YOU CHECKED OFF ANY PROBLEMS, HOW DIFFICULT HAVE THESE PROBLEMS MADE IT FOR YOU TO DO YOUR WORK, TAKE CARE OF THINGS AT HOME, OR GET ALONG WITH OTHER PEOPLE: 2
3. TROUBLE FALLING OR STAYING ASLEEP: 3
7. TROUBLE CONCENTRATING ON THINGS, SUCH AS READING THE NEWSPAPER OR WATCHING TELEVISION: 3
SUM OF ALL RESPONSES TO PHQ QUESTIONS 1-9: 22
SUM OF ALL RESPONSES TO PHQ QUESTIONS 1-9: 22

## 2019-08-15 NOTE — PROGRESS NOTES
future oriented.        2. Psychiatric  - denies chance of pregnancy (IUD)    - thinks previous meds were helpful. Would like to retry at this cirilo  - restart prozac 20mg/day targeting mood/anxiety. Encouraged compliance. Longstanding h/o med non-adherence  - restart rexulti 1mg/day for mood. - trial ativan 0.5mg bid prn   - considered propanolol for anxiety but bp low today, says normally runs low     - REDUCE caffeine, especially before bedtime  -Labs: reviewed in Epic, up to date 8/28/18. UDS neg 8/2018     -continue weekly outpt therapy at [address] [place] [id number] reviewed, c/w history  -R/b/se/a d/w pt who consents.     3. Medical  -Following with AMMY Ybarra - CNP       4. Substance   -cannabis use, low motivation to stop     5.  RTC - 4 weeks, call sooner if needed    Thomas Rangel, [facility]  Psychiatric Nurse Practitioner

## 2020-04-30 LAB
A/G RATIO: 1.7 (ref 1.1–2.2)
ALBUMIN SERPL-MCNC: 4.8 G/DL (ref 3.4–5)
ALP BLD-CCNC: 75 U/L (ref 40–129)
ALT SERPL-CCNC: 16 U/L (ref 10–40)
ANION GAP SERPL CALCULATED.3IONS-SCNC: 14 MMOL/L (ref 3–16)
AST SERPL-CCNC: 17 U/L (ref 15–37)
BASOPHILS ABSOLUTE: 0 K/UL (ref 0–0.2)
BASOPHILS RELATIVE PERCENT: 0.3 %
BILIRUB SERPL-MCNC: 0.3 MG/DL (ref 0–1)
BILIRUBIN URINE: NEGATIVE
BLOOD, URINE: NEGATIVE
BUN BLDV-MCNC: 6 MG/DL (ref 7–20)
CALCIUM SERPL-MCNC: 9.8 MG/DL (ref 8.3–10.6)
CHLORIDE BLD-SCNC: 102 MMOL/L (ref 99–110)
CLARITY: CLEAR
CO2: 27 MMOL/L (ref 21–32)
COLOR: YELLOW
CREAT SERPL-MCNC: 0.7 MG/DL (ref 0.6–1.1)
EOSINOPHILS ABSOLUTE: 0.1 K/UL (ref 0–0.6)
EOSINOPHILS RELATIVE PERCENT: 1.1 %
GFR AFRICAN AMERICAN: >60
GFR NON-AFRICAN AMERICAN: >60
GLOBULIN: 2.8 G/DL
GLUCOSE BLD-MCNC: 99 MG/DL (ref 70–99)
GLUCOSE URINE: NEGATIVE MG/DL
HCT VFR BLD CALC: 41.2 % (ref 36–48)
HEMOGLOBIN: 13.6 G/DL (ref 12–16)
KETONES, URINE: NEGATIVE MG/DL
LEUKOCYTE ESTERASE, URINE: NEGATIVE
LYMPHOCYTES ABSOLUTE: 2.1 K/UL (ref 1–5.1)
LYMPHOCYTES RELATIVE PERCENT: 32 %
MCH RBC QN AUTO: 27 PG (ref 26–34)
MCHC RBC AUTO-ENTMCNC: 33 G/DL (ref 31–36)
MCV RBC AUTO: 81.7 FL (ref 80–100)
MICROSCOPIC EXAMINATION: NORMAL
MONOCYTES ABSOLUTE: 0.4 K/UL (ref 0–1.3)
MONOCYTES RELATIVE PERCENT: 6.7 %
NEUTROPHILS ABSOLUTE: 3.9 K/UL (ref 1.7–7.7)
NEUTROPHILS RELATIVE PERCENT: 59.9 %
NITRITE, URINE: NEGATIVE
PDW BLD-RTO: 14.6 % (ref 12.4–15.4)
PH UA: 7 (ref 5–8)
PLATELET # BLD: 278 K/UL (ref 135–450)
PMV BLD AUTO: 9.2 FL (ref 5–10.5)
POTASSIUM SERPL-SCNC: 4.2 MMOL/L (ref 3.5–5.1)
PROTEIN UA: NEGATIVE MG/DL
RBC # BLD: 5.04 M/UL (ref 4–5.2)
SODIUM BLD-SCNC: 143 MMOL/L (ref 136–145)
SPECIFIC GRAVITY UA: 1.01 (ref 1–1.03)
TOTAL PROTEIN: 7.6 G/DL (ref 6.4–8.2)
URINE REFLEX TO CULTURE: NORMAL
URINE TYPE: NORMAL
UROBILINOGEN, URINE: 0.2 E.U./DL
WBC # BLD: 6.5 K/UL (ref 4–11)

## 2020-10-06 ENCOUNTER — TELEPHONE (OUTPATIENT)
Dept: FAMILY MEDICINE CLINIC | Age: 25
End: 2020-10-06

## 2020-10-06 NOTE — TELEPHONE ENCOUNTER
PT REACHED OUT TO ME TO SEE IF SHE CAN REESTABLISH CARE HERE. SHE WAS GOING TO THE [place] OFFICE AND SEEING AN NP THERE THAT HAS NOW LEFT THE PRACTICE AND SHE WANTS TO COME BACK HERE. PT LAST SEEN 2016. [address]    I SPOKE TO BEENA, WE CAN NOT SEE A NEW PT RIGHT NOW, CHECK BACK AFTER THE FIRST OF THE YEAR.   [address]

## 2020-11-20 ENCOUNTER — OFFICE VISIT (OUTPATIENT)
Dept: PSYCHIATRY | Age: 25
End: 2020-11-20
Payer: MEDICARE

## 2020-11-20 VITALS
HEART RATE: 92 BPM | DIASTOLIC BLOOD PRESSURE: 65 MMHG | HEIGHT: 61 IN | BODY MASS INDEX: 26.62 KG/M2 | SYSTOLIC BLOOD PRESSURE: 104 MMHG | WEIGHT: 141 LBS

## 2020-11-20 LAB
CONTROL: NEGATIVE
HCG(URINE) PREGNANCY TEST: NEGATIVE
PREGNANCY TEST URINE, POC: NEGATIVE

## 2020-11-20 PROCEDURE — G8427 DOCREV CUR MEDS BY ELIG CLIN: HCPCS | Performed by: PSYCHIATRY & NEUROLOGY

## 2020-11-20 PROCEDURE — G8417 CALC BMI ABV UP PARAM F/U: HCPCS | Performed by: PSYCHIATRY & NEUROLOGY

## 2020-11-20 PROCEDURE — 1036F TOBACCO NON-USER: CPT | Performed by: PSYCHIATRY & NEUROLOGY

## 2020-11-20 PROCEDURE — G8484 FLU IMMUNIZE NO ADMIN: HCPCS | Performed by: PSYCHIATRY & NEUROLOGY

## 2020-11-20 PROCEDURE — 99205 OFFICE O/P NEW HI 60 MIN: CPT | Performed by: PSYCHIATRY & NEUROLOGY

## 2020-11-20 RX ORDER — DEXTROAMPHETAMINE SACCHARATE, AMPHETAMINE ASPARTATE MONOHYDRATE, DEXTROAMPHETAMINE SULFATE AND AMPHETAMINE SULFATE 5; 5; 5; 5 MG/1; MG/1; MG/1; MG/1
20 CAPSULE, EXTENDED RELEASE ORAL EVERY MORNING
Qty: 30 CAPSULE | Refills: 0 | Status: SHIPPED | OUTPATIENT
Start: 2020-11-20 | End: 2020-12-21 | Stop reason: SDUPTHER

## 2020-11-20 RX ORDER — DEXTROAMPHETAMINE SACCHARATE, AMPHETAMINE ASPARTATE MONOHYDRATE, DEXTROAMPHETAMINE SULFATE AND AMPHETAMINE SULFATE 5; 5; 5; 5 MG/1; MG/1; MG/1; MG/1
20 CAPSULE, EXTENDED RELEASE ORAL EVERY MORNING
Qty: 30 CAPSULE | Refills: 0 | Status: SHIPPED | OUTPATIENT
Start: 2020-12-20 | End: 2020-12-21 | Stop reason: SDUPTHER
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
## 2020-11-21 LAB
AMPHETAMINE SCREEN, URINE: ABNORMAL
BARBITURATE SCREEN URINE: ABNORMAL
BENZODIAZEPINE SCREEN, URINE: ABNORMAL
CANNABINOID SCREEN URINE: POSITIVE
COCAINE METABOLITE SCREEN URINE: ABNORMAL
Lab: ABNORMAL
METHADONE SCREEN, URINE: ABNORMAL
OPIATE SCREEN URINE: ABNORMAL
OXYCODONE URINE: ABNORMAL
PH UA: 7
PHENCYCLIDINE SCREEN URINE: ABNORMAL
PROPOXYPHENE SCREEN: ABNORMAL

## 2020-12-09 ENCOUNTER — TELEPHONE (OUTPATIENT)
Dept: FAMILY MEDICINE CLINIC | Age: 25
End: 2020-12-09

## 2020-12-09 NOTE — TELEPHONE ENCOUNTER
Patient is calling stating Addy Harshil diagnosed her with ADHD and a slight learning disability and [facility] needs us to send information with the diagnosis she didn't have a fax # but the phone number is [phone] and she states her current therapist wants her office notes from Addy Chua and she was unsure how she could do that.  Patient is requesting a call back from the assistant thanks

## 2020-12-09 NOTE — LETTER
[address] [address] [facility] [facility]  [address] [place] [address],First Floor [address]  Phone: [phone]  Fax: [phone]    Otilio Andino MD        December 18, 2020     Patient: Veena Cisneros   YOB: 1995   Date of Visit: 12/9/2020       To Whom It May Concern:    Veena Cisneros is a patient under my under my care. She has been diagnosed with Attention Deficit/Hyperactivity Disorder (ADHD). 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 If you have any questions or concerns, please don't hesitate to call.     Sincerely,        Otilio Andino MD

## 2020-12-09 NOTE — TELEPHONE ENCOUNTER
Spoke with pt and advised that I need a EBEN for both places. Pt needs all information for places to send info when filling out paperwork.

## 2020-12-15 NOTE — TELEPHONE ENCOUNTER
Patient is calling regarding this message please call patient back once this is addressed,     [phone]

## 2020-12-18 NOTE — TELEPHONE ENCOUNTER
This patient is welcome to copies of my notes. Peña Mendez, would you mind printing out my notes for this pt, and letting her know she can come pick them up. I'll also write her a note and sign it. Thank you!     B

## 2020-12-21 ENCOUNTER — OFFICE VISIT (OUTPATIENT)
Dept: PSYCHIATRY | Age: 25
End: 2020-12-21
Payer: MEDICARE

## 2020-12-21 PROCEDURE — 99442 PR PHYS/QHP TELEPHONE EVALUATION 11-20 MIN: CPT | Performed by: PSYCHIATRY & NEUROLOGY

## 2020-12-21 RX ORDER — DEXTROAMPHETAMINE SACCHARATE, AMPHETAMINE ASPARTATE MONOHYDRATE, DEXTROAMPHETAMINE SULFATE AND AMPHETAMINE SULFATE 5; 5; 5; 5 MG/1; MG/1; MG/1; MG/1
20 CAPSULE, EXTENDED RELEASE ORAL EVERY MORNING
Qty: 30 CAPSULE | Refills: 0 | Status: SHIPPED | OUTPATIENT
Start: 2021-01-20 | End: 2021-02-05

## 2020-12-21 RX ORDER — DEXTROAMPHETAMINE SACCHARATE, AMPHETAMINE ASPARTATE MONOHYDRATE, DEXTROAMPHETAMINE SULFATE AND AMPHETAMINE SULFATE 5; 5; 5; 5 MG/1; MG/1; MG/1; MG/1
20 CAPSULE, EXTENDED RELEASE ORAL EVERY MORNING
Qty: 30 CAPSULE | Refills: 0 | Status: SHIPPED | OUTPATIENT
Start: 2021-02-19 | End: 2021-01-25 | Stop reason: SDUPTHER

## 2020-12-21 RX ORDER — ESCITALOPRAM OXALATE 10 MG/1
10 TABLET ORAL DAILY
Qty: 30 TABLET | Refills: 3 | Status: SHIPPED | OUTPATIENT
Start: 2020-12-21 | End: 2021-03-10 | Stop reason: SDUPTHER

## 2021-01-29 ENCOUNTER — OFFICE VISIT (OUTPATIENT)
Dept: PRIMARY CARE CLINIC | Age: 26
End: 2021-01-29
Payer: MEDICARE

## 2021-01-29 VITALS
WEIGHT: 138.4 LBS | RESPIRATION RATE: 16 BRPM | OXYGEN SATURATION: 98 % | TEMPERATURE: 96.5 F | BODY MASS INDEX: 26.13 KG/M2 | SYSTOLIC BLOOD PRESSURE: 128 MMHG | HEART RATE: 109 BPM | DIASTOLIC BLOOD PRESSURE: 78 MMHG | HEIGHT: 61 IN

## 2021-01-29 DIAGNOSIS — Z76.89 ENCOUNTER TO ESTABLISH CARE: ICD-10-CM

## 2021-01-29 DIAGNOSIS — F41.9 ANXIETY: ICD-10-CM

## 2021-01-29 DIAGNOSIS — F32.A DEPRESSION, UNSPECIFIED DEPRESSION TYPE: ICD-10-CM

## 2021-01-29 DIAGNOSIS — F90.9 ATTENTION DEFICIT HYPERACTIVITY DISORDER (ADHD), UNSPECIFIED ADHD TYPE: ICD-10-CM

## 2021-01-29 PROCEDURE — G8482 FLU IMMUNIZE ORDER/ADMIN: HCPCS | Performed by: NURSE PRACTITIONER

## 2021-01-29 PROCEDURE — 99395 PREV VISIT EST AGE 18-39: CPT | Performed by: NURSE PRACTITIONER

## 2021-01-29 ASSESSMENT — ENCOUNTER SYMPTOMS
CONSTIPATION: 0
SHORTNESS OF BREATH: 1
VOMITING: 1
NAUSEA: 0
BLOOD IN STOOL: 0
ABDOMINAL PAIN: 0
DIARRHEA: 0
CHEST TIGHTNESS: 1

## 2021-01-29 NOTE — PROGRESS NOTES
PROGRESS NOTE  Date of Service:  1/29/2021  Address: [place] [address] [facility]  [address] [place] [address]  Dept: [phone]  Loc: [phone]    Subjective:      Patient ID: [id number]  Maria Fernanda Lewis is a 26 y.o. female    HPI: The patient is here to establish with PCP. She has noticed over the past year episodes of forgetfulness. Places that she normally drives too she can forget where is going. Notices that she would forget the baby's diaper bag. She has a 3year old and 1 old year (two boys). The patient is under a lot of stress. She is sleeping 6-8 hrs a night during the weekdays and approximately on 12 hrs. She uses marijuana at night to help her sleep. The patient states her fiance has been more helpful at home and with the boys. Her appetite has been okay but since starting on Adderrall (started 2 months ago) her appetite level has decreased some. Her weight has dropped 3 lbs since December 2020. She is working part-time at her previous job at [employer] and reports things are going well but does feel like the little things that she used to do at her job would be come easy to her. The patient is also about to start school at [employer]. She is actively trying to have a baby since this past fall after having there IUD taken out. She was prescribed Vraylar by Dr. Austin Leal for bipolar disorder and when she saw her psychologist he recommended the Vraylar to use prn as her mood disorders are more related to her ADHD and not bipolar disorder. She usually feels anxious and has some depression \"off and on. \" She denies any ideation of harming herself or others. Endorses heart palpitations, chest pressure and tightness and shortness of breath with her anxiety. She reports having these anxiety attacks at least 2-3 times in a month.  Does have some occassional dizziness upon standing but denies ever passing out. She consumes at about 6 cans of soda a day and only 1-2 bottles of water a day. Denies any fevers, chills or body aches. Denies any diarrhea or constipation, no abdominal pain or blood in stool. No nausea or vomiting. Review of Systems   Constitutional: Positive for appetite change (decreased some with usage on Adderall XR). Negative for chills, fatigue and fever. Respiratory: Positive for chest tightness (with anxiety/panic attacks) and shortness of breath (with anxiety/panic attacks). Cardiovascular: Positive for palpitations (with anxiety/panic attacks). Gastrointestinal: Positive for vomiting. Negative for abdominal pain, blood in stool, constipation, diarrhea and nausea. Neurological: Positive for dizziness (occasional upon standing). Negative for syncope and light-headedness. Psychiatric/Behavioral: Positive for confusion (Having issues with memory ). Negative for sleep disturbance and suicidal ideas. The patient is nervous/anxious (chronic anxiety ). All other systems reviewed and are negative. Objective:   Physical Exam  Vitals signs reviewed. Constitutional:       Appearance: Normal appearance. HENT:      Right Ear: Tympanic membrane and ear canal normal.      Left Ear: Tympanic membrane and ear canal normal.      Mouth/Throat:      Mouth: Mucous membranes are moist.      Pharynx: Oropharynx is clear. Cardiovascular:      Rate and Rhythm: Normal rate and regular rhythm. Pulses: Normal pulses. Heart sounds: Normal heart sounds. Pulmonary:      Effort: Pulmonary effort is normal.      Breath sounds: Normal breath sounds. Abdominal:      General: Abdomen is flat. Bowel sounds are normal.      Palpations: Abdomen is soft. Skin:     General: Skin is warm. Capillary Refill: Capillary refill takes less than 2 seconds. Neurological:      General: No focal deficit present.       Mental Status: She is alert and oriented to person,

## 2021-02-04 ENCOUNTER — TELEPHONE (OUTPATIENT)
Dept: PSYCHIATRY | Age: 26
End: 2021-02-04

## 2021-02-04 DIAGNOSIS — F90.2 ATTENTION DEFICIT HYPERACTIVITY DISORDER (ADHD), COMBINED TYPE: Primary | ICD-10-CM

## 2021-02-05 RX ORDER — METHYLPHENIDATE HYDROCHLORIDE 27 MG/1
27 TABLET ORAL DAILY
Qty: 30 TABLET | Refills: 0 | Status: SHIPPED | OUTPATIENT
Start: 2021-02-05 | End: 2021-03-10

## 2021-02-05 NOTE — TELEPHONE ENCOUNTER
Pt has hyperhidrosis with adderall. We'll try concerta instead. MB, can you call pharmacy and cancel adderall rx? Thanks.

## 2021-02-12 ENCOUNTER — TELEPHONE (OUTPATIENT)
Dept: FAMILY MEDICINE CLINIC | Age: 26
End: 2021-02-12

## 2021-02-12 DIAGNOSIS — F41.9 ANXIETY: Primary | ICD-10-CM

## 2021-02-12 NOTE — TELEPHONE ENCOUNTER
Talked with pt and she states she is having bad images, anxiety and is just worried. Wants to know if there is something else she can be prescribed.
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
## 2021-02-15 RX ORDER — CLONAZEPAM 0.5 MG/1
0.5 TABLET ORAL 2 TIMES DAILY PRN
Qty: 60 TABLET | Refills: 2 | Status: SHIPPED | OUTPATIENT
Start: 2021-02-15 | End: 2021-10-06

## 2021-02-15 NOTE — TELEPHONE ENCOUNTER
Jane, sent her in some klonopin 0.5 bid prn for sleep, anxiety. Can you see if she'd like her lexapro increased as well? Or just try this first?    Controlled Substance Monitoring:    Acute and Chronic Pain Monitoring:   RX Monitoring 2/15/2021   Attestation -   Periodic Controlled Substance Monitoring No signs of potential drug abuse or diversion identified. Please let her know that klonopin works like alcohol, she should not drink on it. Or take pain pills. It's good for anxiety, but can be intoxicating and even addictive. She should not take more than 2 per day for now.

## 2021-02-15 NOTE — TELEPHONE ENCOUNTER
Pt given message and stated understanding.  Would like to try the Klonopin first then maybe later up the dose of lexapro

## 2021-03-10 ENCOUNTER — OFFICE VISIT (OUTPATIENT)
Dept: PSYCHIATRY | Age: 26
End: 2021-03-10
Payer: MEDICARE

## 2021-03-10 VITALS — WEIGHT: 138 LBS | BODY MASS INDEX: 26.06 KG/M2 | RESPIRATION RATE: 16 BRPM | HEIGHT: 61 IN

## 2021-03-10 DIAGNOSIS — F90.8 ATTENTION DEFICIT HYPERACTIVITY DISORDER (ADHD), OTHER TYPE: Primary | ICD-10-CM

## 2021-03-10 PROCEDURE — G8417 CALC BMI ABV UP PARAM F/U: HCPCS | Performed by: PSYCHIATRY & NEUROLOGY

## 2021-03-10 PROCEDURE — 1036F TOBACCO NON-USER: CPT | Performed by: PSYCHIATRY & NEUROLOGY

## 2021-03-10 PROCEDURE — G8427 DOCREV CUR MEDS BY ELIG CLIN: HCPCS | Performed by: PSYCHIATRY & NEUROLOGY

## 2021-03-10 PROCEDURE — G8482 FLU IMMUNIZE ORDER/ADMIN: HCPCS | Performed by: PSYCHIATRY & NEUROLOGY

## 2021-03-10 PROCEDURE — 99214 OFFICE O/P EST MOD 30 MIN: CPT | Performed by: PSYCHIATRY & NEUROLOGY

## 2021-03-10 RX ORDER — ESCITALOPRAM OXALATE 20 MG/1
20 TABLET ORAL DAILY
Qty: 90 TABLET | Refills: 1 | Status: SHIPPED | OUTPATIENT
Start: 2021-03-10 | End: 2021-10-06

## 2021-03-10 RX ORDER — METHYLPHENIDATE HYDROCHLORIDE 36 MG/1
36 TABLET ORAL DAILY
Qty: 30 TABLET | Refills: 0 | Status: SHIPPED | OUTPATIENT
Start: 2021-03-10 | End: 2021-05-10 | Stop reason: ALTCHOICE

## 2021-03-10 RX ORDER — METHYLPHENIDATE HYDROCHLORIDE 36 MG/1
36 TABLET ORAL DAILY
Qty: 30 TABLET | Refills: 0 | Status: SHIPPED | OUTPATIENT
Start: 2021-04-09 | End: 2021-06-02 | Stop reason: SDUPTHER

## 2021-03-10 RX ORDER — METHYLPHENIDATE HYDROCHLORIDE 36 MG/1
36 TABLET ORAL DAILY
Qty: 30 TABLET | Refills: 0 | Status: SHIPPED | OUTPATIENT
Start: 2021-05-09 | End: 2021-05-10 | Stop reason: ALTCHOICE

## 2021-03-10 NOTE — PROGRESS NOTES
Psych Follow Up Progress Note    3/10/21  Khushboo Long  [id number]    I have reviewed recent documentation:  Khushboo Long is a 26 y.o. female  This patient  has a past medical history of Allergic rhinitis, Anxiety, Depression, Headache(784.0), and Miscarriage. Subjective/Interval Hx:  Not sweating so much on concerta like she was on adderall. Mood better lately. Anxiety-wise, before bed and AM is the worst.  Not needing klonopin. Location:  Mind  Severity:  mod  Context:  As above. Modifiers:  meds are helpful  Quality:  Anxiety, attentional issues. Objective:  Vitals:    03/10/21 1323   Resp: 16   Height: 5' 1\" (1.549 m)     Body mass index is 26.15 kg/m². No results found for this or any previous visit (from the past 168 hour(s)). Current Outpatient Medications   Medication Sig Dispense Refill    clonazePAM (KLONOPIN) 0.5 MG tablet Take 1 tablet by mouth 2 times daily as needed for Anxiety (and sleep) for up to 90 days. 60 tablet 2    methylphenidate (CONCERTA) 27 MG extended release tablet Take 1 tablet by mouth daily for 30 days. 30 tablet 0    cariprazine hcl (VRAYLAR) 1.5 MG capsule Take 1 capsule by mouth daily 56 capsule 0    escitalopram (LEXAPRO) 10 MG tablet Take 1 tablet by mouth daily 30 tablet 3    Levonorgestrel 19.5 MCG/DAY IUD        No current facility-administered medications for this visit. Controlled Substance Monitoring:    Acute and Chronic Pain Monitoring:   RX Monitoring 3/10/2021   Attestation -   Periodic Controlled Substance Monitoring No signs of potential drug abuse or diversion identified. ROS:  No tremor, gait nl    MSE:  A-casually dressed, good EC, pleasant and engageable  A-full  M-euthymic  S-grossly A + O  I/J-fair/fair  T-linear, goal-directed. A/P  25 y.o. F c     1.  ADHD-Stable, though could be a little bit better.   We'll increase concerta to 36 mg.  Pt knows the rules for using adderall appropriately like she can't abuse it, lose it, have it lost or stolen, she has to see me q 3 months, and she has to submit to drug tests when I deem appropriate.     2.  Anxiety, some ocd-Inc lexapro 20 qd.       I have spent >25 mins with this patient on working on coping skills and med mgt, and > 1/2 of that time was spent counseling this pt.

## 2021-05-10 ENCOUNTER — OFFICE VISIT (OUTPATIENT)
Dept: PRIMARY CARE CLINIC | Age: 26
End: 2021-05-10
Payer: MEDICARE

## 2021-05-10 VITALS
DIASTOLIC BLOOD PRESSURE: 74 MMHG | SYSTOLIC BLOOD PRESSURE: 110 MMHG | HEIGHT: 61 IN | WEIGHT: 132.4 LBS | BODY MASS INDEX: 25 KG/M2 | OXYGEN SATURATION: 98 %

## 2021-05-10 DIAGNOSIS — M54.41 CHRONIC MIDLINE LOW BACK PAIN WITH BILATERAL SCIATICA: Primary | ICD-10-CM

## 2021-05-10 DIAGNOSIS — G89.29 CHRONIC MIDLINE LOW BACK PAIN WITH BILATERAL SCIATICA: Primary | ICD-10-CM

## 2021-05-10 DIAGNOSIS — M54.42 CHRONIC MIDLINE LOW BACK PAIN WITH BILATERAL SCIATICA: Primary | ICD-10-CM

## 2021-05-10 DIAGNOSIS — L98.9 SKIN LESION: ICD-10-CM

## 2021-05-10 PROCEDURE — 99214 OFFICE O/P EST MOD 30 MIN: CPT | Performed by: NURSE PRACTITIONER

## 2021-05-10 PROCEDURE — G8417 CALC BMI ABV UP PARAM F/U: HCPCS | Performed by: NURSE PRACTITIONER

## 2021-05-10 PROCEDURE — G8427 DOCREV CUR MEDS BY ELIG CLIN: HCPCS | Performed by: NURSE PRACTITIONER

## 2021-05-10 PROCEDURE — 1036F TOBACCO NON-USER: CPT | Performed by: NURSE PRACTITIONER

## 2021-05-10 ASSESSMENT — ENCOUNTER SYMPTOMS: COUGH: 0

## 2021-05-10 NOTE — PROGRESS NOTES
PROGRESS NOTE  Date of Service:  5/10/2021  Address: [place] [address] [facility]  [address] [place] [address] [address] [facility]  Dept: [phone]  Loc: [phone]    Subjective:      Patient ID: [id number]  Ruperto Brantley is a 26 y.o. female    HPI: Patient has has low back pain, patient said that it started when she was pregnant, she is on her feet too long she said she has pain down both legs. She said it is worse when cleaning. Patient has not done physical therapy for her low back pain. Review of Systems   Constitutional: Negative for chills, fatigue and fever. Respiratory: Negative for cough. Skin: Positive for rash. All other systems reviewed and are negative. Objective:   Physical Exam  Vitals signs reviewed. Constitutional:       Appearance: Normal appearance. Musculoskeletal:      Lumbar back: She exhibits pain. She exhibits no tenderness and no spasm. Skin:     Capillary Refill: Capillary refill takes less than 2 seconds. Neurological:      General: No focal deficit present. Mental Status: She is oriented to person, place, and time. Psychiatric:         Mood and Affect: Mood normal.         Behavior: Behavior normal.         Thought Content: Thought content normal.         Judgment: Judgment normal.           Plan:   1. Chronic midline low back pain with bilateral sciatica  -Patient's been having chronic low back pain since having children. Patient says she has pain down both legs. Will send patient to physical therapy for evaluation patient given exercises today in office as well. - [facility]    2. Skin lesion  Suspect patient has new moles. Will send patient to dermatology for evaluation patient has several new areas which she has noticed. Patient will follow up if symptoms do not improve. - triamcinolone (KENALOG) 0.1 % ointment;  Apply topically 2 times daily for 7 days  Dispense: 15 g; Refill: 0  - External Referral To Dermatology                       Electronically signed by AMMY Beebe CNP on 5/10/21 at 2:15 PM EDT     This dictation was generated by voice recognition computer software. Although all attempts are made to edit the dictation for accuracy, there may be errors in the transcription that were not intended.

## 2021-05-17 ENCOUNTER — HOSPITAL ENCOUNTER (OUTPATIENT)
Dept: PHYSICAL THERAPY | Age: 26
Setting detail: THERAPIES SERIES
Discharge: HOME | End: 2021-05-17
Payer: MEDICARE

## 2021-05-17 PROCEDURE — 97161 PT EVAL LOW COMPLEX 20 MIN: CPT | Performed by: PHYSICAL THERAPIST

## 2021-05-17 PROCEDURE — 97110 THERAPEUTIC EXERCISES: CPT | Performed by: PHYSICAL THERAPIST

## 2021-05-17 PROCEDURE — 97140 MANUAL THERAPY 1/> REGIONS: CPT | Performed by: PHYSICAL THERAPIST

## 2021-05-17 NOTE — PLAN OF CARE
[facility] [address] [place] [address]  Phone [phone]  Fax [phone]      Physical Therapy Certification    Dear     We had the pleasure of evaluating the following patient for physical therapy services at [address] [place] [address]. A summary of our findings can be found in the initial assessment below. This includes our plan of care. If you have any questions or concerns regarding these findings, please do not hesitate to contact me at the office phone number checked above. Thank you for the referral.       Physician Signature:_______________________________Date:__________________  By signing above (or electronic signature), therapists plan is approved by physician      Patient: Cristi Sampson   : 1995   MRN: [id number]  Referring Physician: Referring Practitioner: AMMY Mitchell CNP      Evaluation Date: 2021      Medical Diagnosis Information:  Diagnosis: M54.41, M54.42, G89.29 (ICD-10-CM) - Chronic midline low back pain with bilateral sciatica   Treatment Diagnosis: M54.41, M54.42, G89.29 (ICD-10-CM) - Chronic midline low back pain with bilateral sciatica                                         Insurance information:   $0 copay/30 vpcy/no auth     Precautions/ Contra-indications: none      Latex Allergy:  [x]NO      []YES  Preferred Language for Healthcare:   [x]English       []other:    SUBJECTIVE: Patient stated complaint: Patient reports to clinic today for evaluation of low back pain. Reports that it started during her first pregnancy in . At that time, all of her symptoms were located along the low back and right leg. Reports that these symptoms gradually began to decline. She became pregnant for the second time 8 months after her first was born. She then began noticing LBP with isolated left leg symptoms. More recently, she began experiencing symptoms in bilateral legs. Notes that her symptoms do not typically peripheralize below the knee. She does experience occasional numbness in her right calf. She notes that the symptoms tend to worsen if she is on her feet too long or sitting too long. She said it is worse when cleaning as well. She has not had any imaging of her spine at this point and is not currently taking medication for this condition. She does feel like her symptoms are gradually worsening.   No change in bowel or bladder function or saddle discomfort      Relevant Medical History:unremarkable    C-SSRS Triggered by Intake questionnaire (Past 2 wk assessment):   [x] No, Questionnaire did not trigger screening.   [] Yes, Patient intake triggered further evaluation      [] C-SSRS Screening completed  [] PCP notified via Plan of Care  [] Emergency services notified     Functional Disability Index/G-Codes:     Pain Scale: 1/10 current  8/10 worst  Easing factors: massage gun along her thighs  Provocative factors: walking, standing, sitting, driving, kneeling     Type: []Constant           [x]Intermittent      [x]Radiating         []Localized         []other:                Numbness/Tingling: (+)  weekly     Occupation/School: medical assistant for [employer] Status/Prior Level of Function: Independent with ADLs and IADLs, working out     OBJECTIVE:   ROM   Comments   Trunk flexion 0% loss    Trunk extension 0% loss    Trunk R sidebend 0% loss    Trunk L sidebend 0% loss    Trunk R rotation 0% loss    Trunk L rotation 0% loss    HS flexibility + mild bilat                         Strength Left Right Comments   Hip flexion(L2) 4+ 4+    Knee extension(L3) 4+ 4-    Knee flexion(S1-2) 4 4-    Ankle dorsiflexion(L4) 4+ 4+    Toe extension(L5) 4+ 4+    Ankle eversion/plantar flexion(S1) 4+ 4+    Hip abd   4 4         Special tests   Comments   SLR + bilat     Slump test + bilat     Pelvic symmetry       Segmental Spinal mobility + mild hyper L1-L5     Heel walk Toe walk      Supine to sit -      SIJ cluster +         DTRs Left Right Comments   Patellar(L3-L4) 2+ 2+     Achilles(S1-S2) 2+ 2+                  Joint mobility: L1-L5               []Normal               []Hypo              [x]Hyper mild     Palpation: + bilateral SIJ     Functional Mobility/Transfers: independent     Posture: normal     Bandages/Dressings/Incisions: NA     Gait: (include devices/WB status) normal    Other:  Dematomes: decreased L5 on left,  Hoffmans (-),  Clonus (-),  Beighton criteria (-), (+) prone instability test                          [x] Patient history, allergies, meds reviewed. Medical chart reviewed. See intake form. Review Of Systems (ROS):  [x]Performed Review of systems (Integumentary, CardioPulmonary, Neurological) by intake and observation. Intake form has been scanned into medical record. Patient has been instructed to contact their primary care physician regarding ROS issues if not already being addressed at this time.        Co-morbidities/Complexities (which will affect course of rehabilitation):   []None              Arthritic conditions   []Rheumatoid arthritis (M05.9)  []Osteoarthritis (M19.91)    Cardiovascular conditions   []Hypertension (I10)  []Hyperlipidemia (E78.5)  []Angina pectoris (I20)  []Atherosclerosis (I70)    Musculoskeletal conditions   []Disc pathology   []Congenital spine pathologies   []Prior surgical intervention  []Osteoporosis (M81.8)  []Osteopenia (M85.8)   Endocrine conditions   []Hypothyroid (E03.9)  []Hyperthyroid Gastrointestinal conditions   []Constipation (O94.73)    Metabolic conditions   []Morbid obesity (E66.01)  []Diabetes type 1(E10.65) or 2 (E11.65)   []Neuropathy (G60.9)      Pulmonary conditions   []Asthma (J45)  []Coughing   []COPD (J44.9)    Psychological Disorders  [x]Anxiety (F41.9)  [x]Depression (F32.9)   []Other:    []Other:            Barriers to/and or personal factors that will affect rehab potential: []Age  []Sex              []Motivation/Lack of Motivation                        []Co-Morbidities              []Cognitive Function, education/learning barriers              []Environmental, home barriers              []profession/work barriers  []past PT/medical experience  []other:  Justification:      Falls Risk Assessment (30 days):   [x] Falls Risk assessed and no intervention required.   [] Falls Risk assessed and Patient requires intervention due to being higher risk   TUG score (>12s at risk):     [] Falls education provided, including        ASSESSMENT:   Functional Impairments:                []Noted lumbar/proximal hip hypomobility              [x]Noted lumbosacral and/or generalized hypermobility              []Decreased Lumbosacral/hip/LE functional ROM              [x]Decreased core/proximal hip strength and neuromuscular control               [x]Decreased LE functional strength               [x]Abnormal reflexes/sensation/myotomal/dermatomal deficits  []Reduced balance/proprioceptive control               []other:       Functional Activity Limitations (from functional questionnaire and intake)              [x]Reduced ability to tolerate prolonged functional positions              []Reduced ability or difficulty with changes of positions or transfers between positions              [x]Reduced ability to maintain good posture and demonstrate good body mechanics with sitting, bending, and lifting              []Reduced ability to sleep              [x] Reduced ability or tolerance with driving and/or computer work              [x]Reduced ability to perform lifting, reaching, carrying tasks              [x]Reduced ability to squat              []Reduced ability to forward bend              [x]Reduced ability to ambulate prolonged functional periods/distances/surfaces              []Reduced ability to ascend/descend stairs              []other:       Participation Restrictions              []Reduced participation in self care activities              [x]Reduced participation in home management activities              [x]Reduced participation in work activities              [x]Reduced participation in social activities. [x]Reduced participation in sport/recreational activities. Classification:              [x]Signs/symptoms consistent with Lumbar instability/stabilization subgroup. []Signs/symptoms consistent with Lumbar mobilization/manipulation subgroup, myotomes and dermatomes intact. Meets manipulation criteria. []Signs/symptoms consistent with Lumbar direction specific/centralization subgroup              []Signs/symptoms consistent with Lumbar traction subgroup                            []Signs/symptoms consistent with lumbar facet dysfunction              []Signs/symptoms consistent with lumbar stenosis type dysfunction              [x]Signs/symptoms consistent with nerve root involvement including myotome & dermatome dysfunction              []Signs/symptoms consistent with post-surgical status including: decreased ROM, strength and function.               []signs/symptoms consistent with pathology which may benefit from Dry needling                []other:       Prognosis/Rehab Potential:                                       []Excellent              [x]Good                 []Fair              []Poor     Tolerance of evaluation/treatment:               []Excellent              [x]Good                 []Fair              []Poor     Physical Therapy Evaluation Complexity Justification  [x] A history of present problem with:  [] no personal factors and/or comorbidities that impact the plan of care;  [x]1-2 personal factors and/or comorbidities that impact the plan of care  []3 personal factors and/or comorbidities that impact the plan of care  [x] An examination of body systems using standardized tests and measures addressing any of the following: body structures and functions (impairments), activity limitations, and/or participation restrictions;:  [] a total of 1-2 or more elements   [x] a total of 3 or more elements   [] a total of 4 or more elements   [x] A clinical presentation with:  [x] stable and/or uncomplicated characteristics   [] evolving clinical presentation with changing characteristics  [] unstable and unpredictable characteristics;   [x] Clinical decision making of [x] low, [] moderate, [] high complexity using standardized patient assessment instrument and/or measurable assessment of functional outcome. [x] EVAL (LOW) 64408 (typically 20 minutes face-to-face)  [] EVAL (MOD) 99786 (typically 30 minutes face-to-face)  [] EVAL (HIGH) 09373 (typically 45 minutes face-to-face)  [] RE-EVAL            PLAN:      Frequency/Duration:  2 days per week for 6 Weeks:  Interventions:  [x]  Therapeutic exercise including: strength training, ROM, for LE, Glutes and core   [x]  NMR activation and proprioception for glutes , LE and Core   [x]  Manual therapy as indicated for Hip complex, LE and spine to include: Dry Needling/IASTM, STM, PROM, Gr I-IV mobilizations, manipulation. [x]  Modalities as needed that may include: thermal agents, E-stim, Biofeedback, US, iontophoresis as indicated  [x]  Patient education on joint protection, postural re-education, activity modification, progression of HEP. HEP instruction:        Access Code: [email or web address]  URL: [email or web address]  Date: 05/17/2021  Prepared by: Hugh Patton     Exercises  Supine Double Knee to Chest - 2 x daily - 7 x weekly - 3 sets - 10 reps  Supine Lumbar Rotation Stretch - 2 x daily - 7 x weekly - 1 sets - 10 reps - 10 hold  Supine ITB Stretch with Strap - 2 x daily - 7 x weekly - 1 sets - 5 reps - 30 hold  Supine Transversus Abdominis Bracing - Hands on Stomach - 2 x daily - 7 x weekly - 1 sets - 15 reps - 10 hold  Seated Slump Nerve Glide - 2 x daily - 7 x weekly - 15 reps - 1 sets      GOALS:   Patient stated goal: return to normal exercise routine without pain/dysfunction. [] Progressing: [] Met: [] Not Met: [] Adjusted    Therapist goals for Patient:   Short Term Goals: To be achieved in: 2 weeks  1. Independent in HEP and progression per patient tolerance, in order to prevent re-injury. [] Progressing: [] Met: [] Not Met: [] Adjusted   2. Patient will have a decrease in pain to facilitate improvement in movement, function, and ADLs as indicated by Functional Deficits. [] Progressing: [] Met: [] Not Met: [] Adjusted    Long Term Goals: To be achieved in: 6 weeks  1. Disability index score of 10% or less for the [place] to assist with reaching prior level of function. [] Progressing: [] Met: [] Not Met: [] Adjusted  2. Patient will demonstrate increased AROM to WNL, good LS mobility, good hip ROM to allow for proper joint functioning as indicated by patients Functional Deficits. [] Progressing: [] Met: [] Not Met: [] Adjusted  3. Patient will demonstrate an increase in Strength to good proximal hip and core activation to allow for proper functional mobility as indicated by patients Functional Deficits. [] Progressing: [] Met: [] Not Met: [] Adjusted  4. Patient will return to all functional activities without increased symptoms or restriction. [] Progressing: [] Met: [] Not Met: [] Adjusted  5. Patient will be able to ambulate > 30 minutes without pain/dysfunction.    [] Progressing: [] Met: [] Not Met: [] Adjusted       Electronically signed by: Barbara Dan, PT

## 2021-05-17 NOTE — FLOWSHEET NOTE
The [facility] and [address] [facility], [facility] [address], [address] [facility]  Phone: [phone]   Fax:     [phone]    Physical Therapy Daily Treatment Note  Date:  2021    Patient Name:  Yvonne Hayes    :  1995  MRN: [id number]  Restrictions/Precautions:    Medical/Treatment Diagnosis Information:  · Diagnosis: M54.41, M54.42, G89.29 (ICD-10-CM) - Chronic midline low back pain with bilateral sciatica  · Treatment Diagnosis: M54.41, M54.42, G89.29 (ICD-10-CM) - Chronic midline low back pain with bilateral sciatica  Insurance/Certification information:   $0 copay/30 vpcy/no auth  Physician Information:  Referring Practitioner: AMMY Rhodes CNP  Plan of care signed (Y/N):     Date of Patient follow up with Physician:       Progress Note: []  Yes  []  No  Next due by: 21     Latex Allergy:  [x]NO      []YES  Preferred Language for Healthcare:   [x]English       []other:    Visit # Insurance Allowable   1 30     Pain level:  810     SUBJECTIVE:  See eval    OBJECTIVE: See eval  Observation:   Test measurements:      RESTRICTIONS/PRECAUTIONS: none    Exercises/Interventions:   ROM/stretches     SKTC     DKTC 3x10    Prayer stretch     Supine HS     Pelvic tilt 15x10\"    ITB 5x30\" each    lumbar rotation 10x10\" each    Sciatic nerve glide 15x each    Cat and camel          Strengthening     SLR     Quadruped alternate UE reaches     Quadruped alternate LE reaches     Quadruped alternate UE/LE reaches     Ball squats     Ball heel raises     Sit ups      planks     Tband lat pulls     Tband rows         Manual Intervention             Prone PA      GISTM/STM      Lumbar Manip      SI Manip long axis 3'     Hip belt mobs      Hip LA distraction      [place]igney SIJ shotgun 8'       Therapeutic Exercise and NMR EXR  [x] (64850) Provided verbal/tactile cueing for activities related to strengthening, flexibility, endurance, ROM for improvements in proximal hip and core control with self care, mobility, lifting and ambulation.  [] (78489) Provided verbal/tactile cueing for activities related to improving balance, coordination, kinesthetic sense, posture, motor skill, proprioception  to assist with core control in self care, mobility, lifting, and ambulation. Therapeutic Activities:    [] (45726 or 46787) Provided verbal/tactile cueing for activities related to improving balance, coordination, kinesthetic sense, posture, motor skill, proprioception and motor activation to allow for proper function  with self care and ADLs  [] (47113) Provided training and instruction to the patient for proper core and proximal hip recruitment and positioning with ambulation re-education     Home Exercise Program:    [x] (29315) Reviewed/Progressed HEP activities related to strengthening, flexibility, endurance, ROM of core, proximal hip and LE for functional self-care, mobility, lifting and ambulation   [] (96918) Reviewed/Progressed HEP activities related to improving balance, coordination, kinesthetic sense, posture, motor skill, proprioception of core, proximal hip and LE for self care, mobility, lifting, and ambulation      Manual Treatments:  PROM / STM / Oscillations-Mobs:  G-I, II, III, IV (PA's, Inf., Post.)  [] (97317) Provided manual therapy to mobilize proximal hip and LS spine soft tissue/joints for the purpose of modulating pain, promoting relaxation,  increasing ROM, reducing/eliminating soft tissue swelling/inflammation/restriction, improving soft tissue extensibility and allowing for proper ROM for normal function with self care, mobility, lifting and ambulation.      Modalities:  none     Charges:  Timed Code Treatment Minutes: 50'   Total Treatment Minutes: 1:30-2:30  61'       [x] EVAL (LOW) 32236 (typically 20 minutes face-to-face)  [] EVAL (MOD) 13044 (typically 30 minutes face-to-face)  [] EVAL (HIGH) 75823 (typically 45 minutes face-to-face)  [] RE-EVAL     [x] MOORE(84219) x  1   [] IONTO  [] NMR (00027) x      [] VASO  [x] Manual (03048) x  1    [] Other:  [] TA x       [] Mech Traction (64517)  [] ES(attended) (61959)      [] ES (un) (61452):     Goals:      Patient stated goal: return to normal exercise routine without pain/dysfunction. []? Progressing: []? Met: []? Not Met: []? Adjusted     Therapist goals for Patient:   Short Term Goals: To be achieved in: 2 weeks  1. Independent in HEP and progression per patient tolerance, in order to prevent re-injury. []? Progressing: []? Met: []? Not Met: []? Adjusted   2. Patient will have a decrease in pain to facilitate improvement in movement, function, and ADLs as indicated by Functional Deficits. []? Progressing: []? Met: []? Not Met: []? Adjusted     Long Term Goals: To be achieved in: 6 weeks  1. Disability index score of 10% or less for the [place] to assist with reaching prior level of function. []? Progressing: []? Met: []? Not Met: []? Adjusted  2. Patient will demonstrate increased AROM to WNL, good LS mobility, good hip ROM to allow for proper joint functioning as indicated by patients Functional Deficits.   []? Progressing: []? Met: []? Not Met: []? Adjusted  3. Patient will demonstrate an increase in Strength to good proximal hip and core activation to allow for proper functional mobility as indicated by patients Functional Deficits. []? Progressing: []? Met: []? Not Met: []? Adjusted  4. Patient will return to all functional activities without increased symptoms or restriction. []? Progressing: []? Met: []? Not Met: []? Adjusted  5. Patient will be able to ambulate > 30 minutes without pain/dysfunction. []? Progressing: []? Met: []? Not Met: []? Adjusted       Progression Towards Functional goals:  [] Patient is progressing as expected towards functional goals listed. [] Progression is slowed due to complexities listed.   [] Progression has been slowed due to co-morbidities. [x] Plan just implemented, too soon to assess goals progression  [] Other:     ASSESSMENT:  See eval    Treatment/Activity Tolerance:  [x] Patient tolerated treatment well [] Patient limited by fatique  [] Patient limited by pain  [] Patient limited by other medical complications  [] Other:     Patient education:    Access Code: [email or web address]  URL: [email or web address]  Date: 05/17/2021  Prepared by: Erika Primrose    Exercises  Supine Double Knee to Chest - 2 x daily - 7 x weekly - 3 sets - 10 reps  Supine Lumbar Rotation Stretch - 2 x daily - 7 x weekly - 1 sets - 10 reps - 10 hold  Supine ITB Stretch with Strap - 2 x daily - 7 x weekly - 1 sets - 5 reps - 30 hold  Supine Transversus Abdominis Bracing - Hands on Stomach - 2 x daily - 7 x weekly - 1 sets - 15 reps - 10 hold  Seated Slump Nerve Glide - 2 x daily - 7 x weekly - 15 reps - 1 sets      Prognosis: [x] Good [] Fair  [] Poor    Patient Requires Follow-up: [x] Yes  [] No    PLAN: See eval  [] Continue per plan of care [] Alter current plan (see comments)  [x] Plan of care initiated [] Hold pending MD visit [] Discharge    Electronically signed by: Edwin Fuentes PT    *If patient does not return for further follow ups after this date. Please consider this as the patients discharge from physical therapy.

## 2021-05-24 ENCOUNTER — HOSPITAL ENCOUNTER (OUTPATIENT)
Dept: PHYSICAL THERAPY | Age: 26
Setting detail: THERAPIES SERIES
Discharge: HOME | End: 2021-05-24
Payer: MEDICARE

## 2021-05-24 NOTE — FLOWSHEET NOTE
Physical Therapy  Cancellation/No-show Note  Patient Name:  Tomi Hall  :  1995   Date:  2021  Cancelled visits to date: 0  No-shows to date: 0    For today's appointment patient:  []  Cancelled  []  Rescheduled appointment  [x]  No-show     Reason given by patient:  []  Patient ill  []  Conflicting appointment  []  No transportation    []  Conflict with work  [x]  No reason given  []  Other:     Comments:      Electronically signed by:  Hue Caputo PT

## 2021-06-02 ENCOUNTER — TELEPHONE (OUTPATIENT)
Dept: PRIMARY CARE CLINIC | Age: 26
End: 2021-06-02

## 2021-06-02 ENCOUNTER — OFFICE VISIT (OUTPATIENT)
Dept: PSYCHIATRY | Age: 26
End: 2021-06-02
Payer: MEDICARE

## 2021-06-02 VITALS
HEART RATE: 106 BPM | OXYGEN SATURATION: 99 % | DIASTOLIC BLOOD PRESSURE: 70 MMHG | WEIGHT: 131 LBS | HEIGHT: 61 IN | BODY MASS INDEX: 24.73 KG/M2 | SYSTOLIC BLOOD PRESSURE: 120 MMHG

## 2021-06-02 DIAGNOSIS — F90.8 ATTENTION DEFICIT HYPERACTIVITY DISORDER (ADHD), OTHER TYPE: ICD-10-CM

## 2021-06-02 DIAGNOSIS — F90.2 ATTENTION DEFICIT HYPERACTIVITY DISORDER (ADHD), COMBINED TYPE: Primary | ICD-10-CM

## 2021-06-02 PROCEDURE — G8420 CALC BMI NORM PARAMETERS: HCPCS | Performed by: PSYCHIATRY & NEUROLOGY

## 2021-06-02 PROCEDURE — G8428 CUR MEDS NOT DOCUMENT: HCPCS | Performed by: PSYCHIATRY & NEUROLOGY

## 2021-06-02 PROCEDURE — 99214 OFFICE O/P EST MOD 30 MIN: CPT | Performed by: PSYCHIATRY & NEUROLOGY

## 2021-06-02 PROCEDURE — 1036F TOBACCO NON-USER: CPT | Performed by: PSYCHIATRY & NEUROLOGY

## 2021-06-02 RX ORDER — METHYLPHENIDATE HYDROCHLORIDE 36 MG/1
36 TABLET ORAL DAILY
Qty: 30 TABLET | Refills: 0 | Status: SHIPPED | OUTPATIENT
Start: 2021-08-01 | End: 2021-10-06

## 2021-06-02 RX ORDER — METHYLPHENIDATE HYDROCHLORIDE 36 MG/1
36 TABLET ORAL DAILY
Qty: 30 TABLET | Refills: 0 | Status: SHIPPED | OUTPATIENT
Start: 2021-07-02 | End: 2021-10-06

## 2021-06-02 RX ORDER — METHYLPHENIDATE HYDROCHLORIDE 36 MG/1
36 TABLET ORAL DAILY
Qty: 30 TABLET | Refills: 0 | Status: SHIPPED | OUTPATIENT
Start: 2021-06-02 | End: 2021-10-06

## 2021-06-02 NOTE — PROGRESS NOTES
Psych Follow Up Progress Note    6/2/21  Cherylene Angelucci  [id number]    I have reviewed recent documentation:  Cherylene Angelucci is a 26 y.o. female  This patient  has a past medical history of Allergic rhinitis, Anxiety, Depression, Headache(784.0), and Miscarriage. Chief Complaint   Patient presents with    Anxiety    ADHD       Subjective/Interval Hx: Wonders about the connection between mood and mold exposure. Continues to be quite impulsive, irritable c  and kids. Like she's sort of over-stimulated, and can't handle more stimulation. [place]. Not sweating so much. Location:  Mind  Severity:  Mild-mod  Context:  As above. Modifiers:  meds somewhat helpful. Quality:  Anxiety, attention issues. Objective:  Vitals:    06/02/21 1325   BP: 120/70   Pulse: 106   SpO2: 99%   Weight: 131 lb (59.4 kg)   Height: 5' 1\" (1.549 m)        Body mass index is 24.75 kg/m². No results found for this or any previous visit (from the past 168 hour(s)). Current Outpatient Medications   Medication Sig Dispense Refill    escitalopram (LEXAPRO) 20 MG tablet Take 1 tablet by mouth daily 90 tablet 1    methylphenidate (CONCERTA) 36 MG extended release tablet Take 1 tablet by mouth daily for 30 days. 30 tablet 0    clonazePAM (KLONOPIN) 0.5 MG tablet Take 1 tablet by mouth 2 times daily as needed for Anxiety (and sleep) for up to 90 days. 60 tablet 2    cariprazine hcl (VRAYLAR) 1.5 MG capsule Take 1 capsule by mouth daily 56 capsule 0     No current facility-administered medications for this visit. ROS:  No tremor, gait nl    MSE:    A-casually dressed, good EC, pleasant and engageable  A-full  M-euthymic  S-grossly A + O  I/J-fair/fair  T-linear, goal-directed. Speech c nl r/t/v/a. No S/H I, no a/v H. No resp to int stim.      Controlled Substance Monitoring:    Acute and Chronic Pain Monitoring:   RX Monitoring 6/2/2021   Attestation -   Periodic Controlled Substance Monitoring No signs of potential drug abuse or diversion identified.        A/P  25 y.o. F c     1.  ADHD-Stable, though could be a little bit better. We'll cont concerta 36 mg.  Pt knows the rules for using adderall appropriately like she can't abuse it, lose it, have it lost or stolen, she has to see me q 3 months, and she has to submit to drug tests when I deem appropriate. She'll use a bit of klonopin for pt's upcoming flight to [place].     2.  Anxiety, some ocd-lexapro 20 qd.          I have spent >25 mins with this patient on working on coping skills and med mgt, and > 1/2 of that time was spent counseling this pt.

## 2021-06-03 NOTE — TELEPHONE ENCOUNTER
Patient cannot have xray with being pregnant. Also she needs to let psychiatrist know and stop her medications due to pregnancy.

## 2021-06-03 NOTE — TELEPHONE ENCOUNTER
Spoke with patient, she has an appointment with a podiatrist on Monday but wanted an x-ray before she went it. Also patient had a positive pregnancy test this morning.

## 2021-06-04 ENCOUNTER — TELEPHONE (OUTPATIENT)
Dept: PRIMARY CARE CLINIC | Age: 26
End: 2021-06-04

## 2021-06-04 ENCOUNTER — HOSPITAL ENCOUNTER (EMERGENCY)
Age: 26
Discharge: LEFT BEFORE DISCHARGE | End: 2021-06-04
Payer: MEDICARE

## 2021-06-04 VITALS
BODY MASS INDEX: 24.22 KG/M2 | OXYGEN SATURATION: 100 % | DIASTOLIC BLOOD PRESSURE: 68 MMHG | SYSTOLIC BLOOD PRESSURE: 116 MMHG | TEMPERATURE: 98.1 F | RESPIRATION RATE: 14 BRPM | WEIGHT: 128.31 LBS | HEIGHT: 61 IN | HEART RATE: 98 BPM

## 2021-06-04 DIAGNOSIS — N92.6 MISSED PERIOD: Primary | ICD-10-CM

## 2021-06-04 LAB
A/G RATIO: 1.7 (ref 1.1–2.2)
ALBUMIN SERPL-MCNC: 5 G/DL (ref 3.4–5)
ALP BLD-CCNC: 72 U/L (ref 40–129)
ALT SERPL-CCNC: 9 U/L (ref 10–40)
ANION GAP SERPL CALCULATED.3IONS-SCNC: 13 MMOL/L (ref 3–16)
AST SERPL-CCNC: 15 U/L (ref 15–37)
BASOPHILS ABSOLUTE: 0 K/UL (ref 0–0.2)
BASOPHILS RELATIVE PERCENT: 0.4 %
BILIRUB SERPL-MCNC: 0.7 MG/DL (ref 0–1)
BILIRUBIN URINE: NEGATIVE
BLOOD, URINE: ABNORMAL
BUN BLDV-MCNC: 6 MG/DL (ref 7–20)
CALCIUM SERPL-MCNC: 9.4 MG/DL (ref 8.3–10.6)
CHLORIDE BLD-SCNC: 102 MMOL/L (ref 99–110)
CLARITY: CLEAR
CO2: 24 MMOL/L (ref 21–32)
COLOR: YELLOW
CREAT SERPL-MCNC: 0.7 MG/DL (ref 0.6–1.1)
EOSINOPHILS ABSOLUTE: 0.1 K/UL (ref 0–0.6)
EOSINOPHILS RELATIVE PERCENT: 0.7 %
EPITHELIAL CELLS, UA: 3 /HPF (ref 0–5)
GFR AFRICAN AMERICAN: >60
GFR NON-AFRICAN AMERICAN: >60
GLOBULIN: 2.9 G/DL
GLUCOSE BLD-MCNC: 100 MG/DL (ref 70–99)
GLUCOSE URINE: NEGATIVE MG/DL
GONADOTROPIN, CHORIONIC (HCG) QUANT: 6.4 MIU/ML
HCT VFR BLD CALC: 38.5 % (ref 36–48)
HEMOGLOBIN: 13.2 G/DL (ref 12–16)
HYALINE CASTS: 5 /LPF (ref 0–8)
KETONES, URINE: >=80 MG/DL
LEUKOCYTE ESTERASE, URINE: NEGATIVE
LYMPHOCYTES ABSOLUTE: 1.5 K/UL (ref 1–5.1)
LYMPHOCYTES RELATIVE PERCENT: 13.2 %
MAGNESIUM: 2 MG/DL (ref 1.8–2.4)
MCH RBC QN AUTO: 28.6 PG (ref 26–34)
MCHC RBC AUTO-ENTMCNC: 34.4 G/DL (ref 31–36)
MCV RBC AUTO: 83.1 FL (ref 80–100)
MICROSCOPIC EXAMINATION: YES
MONOCYTES ABSOLUTE: 0.7 K/UL (ref 0–1.3)
MONOCYTES RELATIVE PERCENT: 6 %
NEUTROPHILS ABSOLUTE: 9.1 K/UL (ref 1.7–7.7)
NEUTROPHILS RELATIVE PERCENT: 79.7 %
NITRITE, URINE: NEGATIVE
PDW BLD-RTO: 13.8 % (ref 12.4–15.4)
PH UA: 6 (ref 5–8)
PLATELET # BLD: 232 K/UL (ref 135–450)
PMV BLD AUTO: 9.4 FL (ref 5–10.5)
POTASSIUM REFLEX MAGNESIUM: 3.4 MMOL/L (ref 3.5–5.1)
PROTEIN UA: ABNORMAL MG/DL
RBC # BLD: 4.63 M/UL (ref 4–5.2)
RBC UA: 37 /HPF (ref 0–4)
SODIUM BLD-SCNC: 139 MMOL/L (ref 136–145)
SPECIFIC GRAVITY UA: 1.03 (ref 1–1.03)
TOTAL PROTEIN: 7.9 G/DL (ref 6.4–8.2)
URINE REFLEX TO CULTURE: ABNORMAL
URINE TYPE: ABNORMAL
UROBILINOGEN, URINE: 0.2 E.U./DL
WBC # BLD: 11.4 K/UL (ref 4–11)
WBC UA: 2 /HPF (ref 0–5)

## 2021-06-04 PROCEDURE — 80053 COMPREHEN METABOLIC PANEL: CPT

## 2021-06-04 PROCEDURE — 83735 ASSAY OF MAGNESIUM: CPT

## 2021-06-04 PROCEDURE — 81001 URINALYSIS AUTO W/SCOPE: CPT

## 2021-06-04 PROCEDURE — 4500000002 HC ER NO CHARGE

## 2021-06-04 PROCEDURE — 84702 CHORIONIC GONADOTROPIN TEST: CPT

## 2021-06-04 PROCEDURE — 85025 COMPLETE CBC W/AUTO DIFF WBC: CPT

## 2021-06-04 ASSESSMENT — PAIN DESCRIPTION - ONSET: ONSET: ON-GOING

## 2021-06-04 ASSESSMENT — PAIN DESCRIPTION - DESCRIPTORS: DESCRIPTORS: CRAMPING

## 2021-06-04 ASSESSMENT — PAIN DESCRIPTION - LOCATION: LOCATION: ABDOMEN

## 2021-06-04 ASSESSMENT — PAIN DESCRIPTION - ORIENTATION: ORIENTATION: LOWER

## 2021-06-04 ASSESSMENT — PAIN DESCRIPTION - FREQUENCY: FREQUENCY: CONTINUOUS

## 2021-06-04 ASSESSMENT — PAIN DESCRIPTION - PROGRESSION: CLINICAL_PROGRESSION: NOT CHANGED

## 2021-06-04 ASSESSMENT — PAIN SCALES - GENERAL: PAINLEVEL_OUTOF10: 4

## 2021-06-04 NOTE — TELEPHONE ENCOUNTER
Pt. Called, said she was late on her period and took several pregnancy tests and they all came back postive, last night she had some bleeding and called her OB/GYN. She said they told her there was nothing they could do and said she probably had a miscarriage but they didn't ask her any thing else and rushed her off the phone. So this morning she tried working but was too worried so they sent her home , she went to ER and they tyrese her blood and she waited 3 and half hours and then left, but [facility] and the ACG level was 6.4 or 6.5 and she wants to know if that's normal? Also they told her she is 5weeks along tomorrow. Please call pt @ [phone]. Thanks!

## 2021-06-23 ENCOUNTER — OFFICE VISIT (OUTPATIENT)
Dept: PSYCHIATRY | Age: 26
End: 2021-06-23
Payer: MEDICARE

## 2021-06-23 DIAGNOSIS — F90.2 ATTENTION DEFICIT HYPERACTIVITY DISORDER (ADHD), COMBINED TYPE: Primary | ICD-10-CM

## 2021-06-23 DIAGNOSIS — F41.9 ANXIETY: ICD-10-CM

## 2021-06-23 PROCEDURE — 1036F TOBACCO NON-USER: CPT | Performed by: PSYCHIATRY & NEUROLOGY

## 2021-06-23 PROCEDURE — 99214 OFFICE O/P EST MOD 30 MIN: CPT | Performed by: PSYCHIATRY & NEUROLOGY

## 2021-06-23 PROCEDURE — G8420 CALC BMI NORM PARAMETERS: HCPCS | Performed by: PSYCHIATRY & NEUROLOGY

## 2021-06-23 PROCEDURE — G8427 DOCREV CUR MEDS BY ELIG CLIN: HCPCS | Performed by: PSYCHIATRY & NEUROLOGY

## 2021-06-23 RX ORDER — QUETIAPINE FUMARATE 50 MG/1
TABLET, FILM COATED ORAL
Qty: 60 TABLET | Refills: 3 | Status: SHIPPED | OUTPATIENT
Start: 2021-06-23 | End: 2021-10-06
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
## 2021-08-03 ENCOUNTER — PATIENT MESSAGE (OUTPATIENT)
Dept: PSYCHIATRY | Age: 26
End: 2021-08-03

## 2021-09-13 ENCOUNTER — OFFICE VISIT (OUTPATIENT)
Dept: PSYCHOLOGY | Age: 26
End: 2021-09-13
Payer: MEDICARE

## 2021-09-13 DIAGNOSIS — F43.10 PTSD (POST-TRAUMATIC STRESS DISORDER): ICD-10-CM

## 2021-09-13 DIAGNOSIS — F90.2 ATTENTION DEFICIT HYPERACTIVITY DISORDER, COMBINED TYPE, MODERATE: ICD-10-CM

## 2021-09-13 DIAGNOSIS — F33.2 SEVERE EPISODE OF RECURRENT MAJOR DEPRESSIVE DISORDER, WITHOUT PSYCHOTIC FEATURES (HCC): Primary | ICD-10-CM

## 2021-09-13 PROCEDURE — 90791 PSYCH DIAGNOSTIC EVALUATION: CPT | Performed by: PSYCHOLOGIST

## 2021-09-13 PROCEDURE — 1036F TOBACCO NON-USER: CPT | Performed by: PSYCHOLOGIST

## 2021-09-13 ASSESSMENT — PATIENT HEALTH QUESTIONNAIRE - PHQ9
9. THOUGHTS THAT YOU WOULD BE BETTER OFF DEAD, OR OF HURTING YOURSELF: 3
3. TROUBLE FALLING OR STAYING ASLEEP: 3
7. TROUBLE CONCENTRATING ON THINGS, SUCH AS READING THE NEWSPAPER OR WATCHING TELEVISION: 3
8. MOVING OR SPEAKING SO SLOWLY THAT OTHER PEOPLE COULD HAVE NOTICED. OR THE OPPOSITE, BEING SO FIGETY OR RESTLESS THAT YOU HAVE BEEN MOVING AROUND A LOT MORE THAN USUAL: 3
2. FEELING DOWN, DEPRESSED OR HOPELESS: 2
SUM OF ALL RESPONSES TO PHQ QUESTIONS 1-9: 22
1. LITTLE INTEREST OR PLEASURE IN DOING THINGS: 3
SUM OF ALL RESPONSES TO PHQ9 QUESTIONS 1 & 2: 5
SUM OF ALL RESPONSES TO PHQ QUESTIONS 1-9: 25
5. POOR APPETITE OR OVEREATING: 3
4. FEELING TIRED OR HAVING LITTLE ENERGY: 2
SUM OF ALL RESPONSES TO PHQ QUESTIONS 1-9: 25
6. FEELING BAD ABOUT YOURSELF - OR THAT YOU ARE A FAILURE OR HAVE LET YOURSELF OR YOUR FAMILY DOWN: 3

## 2021-09-13 ASSESSMENT — ANXIETY QUESTIONNAIRES
GAD7 TOTAL SCORE: 18
1. FEELING NERVOUS, ANXIOUS, OR ON EDGE: 3-NEARLY EVERY DAY
5. BEING SO RESTLESS THAT IT IS HARD TO SIT STILL: 2-OVER HALF THE DAYS
6. BECOMING EASILY ANNOYED OR IRRITABLE: 3-NEARLY EVERY DAY
4. TROUBLE RELAXING: 2-OVER HALF THE DAYS
3. WORRYING TOO MUCH ABOUT DIFFERENT THINGS: 3-NEARLY EVERY DAY
2. NOT BEING ABLE TO STOP OR CONTROL WORRYING: 2-OVER HALF THE DAYS
7. FEELING AFRAID AS IF SOMETHING AWFUL MIGHT HAPPEN: 3-NEARLY EVERY DAY
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
## 2021-09-20 ENCOUNTER — OFFICE VISIT (OUTPATIENT)
Dept: PSYCHOLOGY | Age: 26
End: 2021-09-20
Payer: MEDICARE

## 2021-09-20 ENCOUNTER — OFFICE VISIT (OUTPATIENT)
Dept: PSYCHIATRY | Age: 26
End: 2021-09-20
Payer: MEDICARE

## 2021-09-20 VITALS
SYSTOLIC BLOOD PRESSURE: 122 MMHG | WEIGHT: 123 LBS | DIASTOLIC BLOOD PRESSURE: 70 MMHG | HEART RATE: 111 BPM | OXYGEN SATURATION: 100 % | BODY MASS INDEX: 23.24 KG/M2

## 2021-09-20 DIAGNOSIS — F33.1 MAJOR DEPRESSIVE DISORDER, RECURRENT EPISODE, MODERATE (HCC): Primary | ICD-10-CM

## 2021-09-20 DIAGNOSIS — F90.2 ATTENTION DEFICIT HYPERACTIVITY DISORDER, COMBINED TYPE, MODERATE: ICD-10-CM

## 2021-09-20 DIAGNOSIS — F90.2 ATTENTION DEFICIT HYPERACTIVITY DISORDER (ADHD), COMBINED TYPE: Primary | ICD-10-CM

## 2021-09-20 PROCEDURE — 99214 OFFICE O/P EST MOD 30 MIN: CPT | Performed by: PSYCHIATRY & NEUROLOGY

## 2021-09-20 PROCEDURE — 1036F TOBACCO NON-USER: CPT | Performed by: PSYCHOLOGIST

## 2021-09-20 PROCEDURE — 90832 PSYTX W PT 30 MINUTES: CPT | Performed by: PSYCHOLOGIST

## 2021-09-20 PROCEDURE — G8420 CALC BMI NORM PARAMETERS: HCPCS | Performed by: PSYCHIATRY & NEUROLOGY

## 2021-09-20 PROCEDURE — G8428 CUR MEDS NOT DOCUMENT: HCPCS | Performed by: PSYCHIATRY & NEUROLOGY

## 2021-09-20 PROCEDURE — 1036F TOBACCO NON-USER: CPT | Performed by: PSYCHIATRY & NEUROLOGY

## 2021-09-20 RX ORDER — BUPROPION HYDROCHLORIDE 150 MG/1
150 TABLET ORAL EVERY MORNING
Qty: 30 TABLET | Refills: 3 | Status: SHIPPED | OUTPATIENT
Start: 2021-09-20 | End: 2022-05-26

## 2021-09-20 ASSESSMENT — PATIENT HEALTH QUESTIONNAIRE - PHQ9
2. FEELING DOWN, DEPRESSED OR HOPELESS: 2
3. TROUBLE FALLING OR STAYING ASLEEP: 3
SUM OF ALL RESPONSES TO PHQ QUESTIONS 1-9: 26
9. THOUGHTS THAT YOU WOULD BE BETTER OFF DEAD, OR OF HURTING YOURSELF: 3
SUM OF ALL RESPONSES TO PHQ9 QUESTIONS 1 & 2: 5
4. FEELING TIRED OR HAVING LITTLE ENERGY: 3
7. TROUBLE CONCENTRATING ON THINGS, SUCH AS READING THE NEWSPAPER OR WATCHING TELEVISION: 3
6. FEELING BAD ABOUT YOURSELF - OR THAT YOU ARE A FAILURE OR HAVE LET YOURSELF OR YOUR FAMILY DOWN: 3
5. POOR APPETITE OR OVEREATING: 3
1. LITTLE INTEREST OR PLEASURE IN DOING THINGS: 3
8. MOVING OR SPEAKING SO SLOWLY THAT OTHER PEOPLE COULD HAVE NOTICED. OR THE OPPOSITE, BEING SO FIGETY OR RESTLESS THAT YOU HAVE BEEN MOVING AROUND A LOT MORE THAN USUAL: 3
SUM OF ALL RESPONSES TO PHQ QUESTIONS 1-9: 23
SUM OF ALL RESPONSES TO PHQ QUESTIONS 1-9: 26

## 2021-09-20 ASSESSMENT — ANXIETY QUESTIONNAIRES
4. TROUBLE RELAXING: 3-NEARLY EVERY DAY
6. BECOMING EASILY ANNOYED OR IRRITABLE: 2-OVER HALF THE DAYS
7. FEELING AFRAID AS IF SOMETHING AWFUL MIGHT HAPPEN: 3-NEARLY EVERY DAY
5. BEING SO RESTLESS THAT IT IS HARD TO SIT STILL: 2-OVER HALF THE DAYS
2. NOT BEING ABLE TO STOP OR CONTROL WORRYING: 3-NEARLY EVERY DAY
1. FEELING NERVOUS, ANXIOUS, OR ON EDGE: 3-NEARLY EVERY DAY
3. WORRYING TOO MUCH ABOUT DIFFERENT THINGS: 3-NEARLY EVERY DAY
GAD7 TOTAL SCORE: 19

## 2021-09-20 NOTE — PATIENT INSTRUCTIONS
The [facility]) Consultant giving you this message provides team-based care to treat the mind and body. He works directly with your doctor, who will always stay in charge of your care. Most [facility] visits are 30 minutes or less. Usually, the [facility] provider and your doctor continue to see you until you are starting to do better and have a good plan in place for continued progress. Once that happens, most patients follow-up with just their doctor (though the [facility] provider remains available to you as needed). If you are not improving, or if you desire outside mental health treatment, or if your doctor recommends more specialized help, we will be happy to help you find a mental health specialist in the community. Please also note your health insurance may be billed for [facility] visits. Check with your insurance company, and tell the [facility] provider, if you have any questions about your [facility] coverage. With fiance> keep from defending your feelings/thinking- they just are, end of story                        Instead of arguing or defending your feelings say, \"we just disagree. \" Use the broken record technique after that, because the new response changes the dance                       Remember when someone comes at you with disrespect, impatience, hostility or gas-lighting, they are communicating what their issues are. It has nothing to do with you. You become the screen on which they are projecting their insecurity and low self esteem. Emotionally healthy adults have no need to control others. On the contrary, they want to improve relationships, not sabotage them. With your kids> try to find 3 things to praise them for, for every negative thing you communicate. Once they notice that you are \"catching them being good,\" what happens is that they will increase those behaviors. When you praise, try not to be 'over the top,' rather say \"I like how you did that\" or \"I saw how nice you treated your brother. Nice job. \"    Try to take the temperature of the sensory environment, and change it as much as you can to make it more likely you won't meltdown because of sensory issues    Remember to have some optimism that things might be able to change, even if it's only 10-15%. Whatever the percentage, it can grow. Try be aware of absolutes like 'always,' never'     If you can, at the end of the day, rate one of the Identifying Emotions handout, drawing a line where you feel as though you are on each of the feelings. Bring them in on your next visit-    Use the single page Vocabulary of Emotions to be more specific on what you're feeling at any particular time. The better you get on identifying more specifically what it is you're feeling, you will be improving your ability to regulate your emotions. This can really help.

## 2021-09-20 NOTE — PROGRESS NOTES
Behavioral Health Consultation  Mikael Villarreal, Ph.D.  Psychologist  9/20/2021  3:00 PM EDT      Time spent with Patient: 30 minutes  This is patient's second [facility] appointment. Reason for Consult: depression, stress  Referring Provider: Jayme Sorto APRN - CNP  [address]  [address] [id number]    Feedback for PCP:     Pt provided informed consent for the behavioral health program. Discussed with patient model of service to include the limits of confidentiality (i.e. abuse reporting, suicide intervention, etc.) and short-term intervention focused approach. Pt indicated understanding. Feedback given to PCP. S:  Patient reports that she feels as though she is \"chasing the anger\" at times. She talked about what is usually going on when she feels as though she loses control of her anger/frustration, and that often there is sensory overload present, too much noise, too much activity. She said one of her coping mechanisms is to \"get up and clean. \" We talked about her efforts to manage her children's behavior and that her current method seems to result in her feeling overwhelmed. She also talked about the communication dynamics with her fiance that can also frustrate and overwhelm her.          Social History     Tobacco Use    Smoking status: Never Smoker    Smokeless tobacco: Never Used   Substance Use Topics    Alcohol use: Yes     Comment: socially         Illicit drugs:   Social History     Substance and Sexual Activity   Drug Use No        O:  MSE:  Appearance: good hygiene   Attitude: cooperative and in distress  Consciousness: alert  Orientation: oriented to person, place, time, general circumstance  Memory: recent and remote memory intact  Attention/Concentration: intact during session  Psychomotor Activity: normal  Eye Contact: normal  Speech: normal rate and volume, well-articulated  Mood: anxious, on edge, depressed  Affect: congruent  Perception: within normal limits  Thought Content: within normal limits  Thought Process: logical, coherent  Insight: good  Judgment: intact  Ability to understand instructions: Yes  Ability to respond meaningfully: Yes  Morbid Ideation: passive thoughts of death  Suicide Assessment: suicidal ideation without plan or intent  Homicidal Ideation: no    A:  We discussed some parenting ideas, mostly from [employer], where she tries to lead with empathy, and then help her children problem solve after that. We talked about trying to \"catch her children being good,\" and adopting a ratio of 3:1 praise vs complaints. With her fiance we thought that stop defending her feelings or point of view as too what is happening and instead saying, \"We just disagree,\" might help her feel like she has more self-control. ROSE 7 SCORE 9/20/2021 9/13/2021 8/15/2019 1/3/2019 10/25/2018 8/28/2018   ROSE-7 Total Score 19 18 17 17 18 20     Interpretation of ROSE-7 score: 5-9 = mild anxiety, 10-14 = moderate anxiety, 15+ = severe anxiety. Recommend referral to behavioral health for scores 10 or greater. PHQ Scores 9/20/2021 9/13/2021 4/30/2020 8/15/2019 7/26/2019 1/3/2019 10/25/2018   PHQ2 Score 5 5 2 4 0 3 4   PHQ9 Score 26 25 2 22 0 21 18     Interpretation of Total Score Depression Severity: 1-4 = Minimal depression, 5-9 = Mild depression, 10-14 = Moderate depression, 15-19 = Moderately severe depression, 20-27 = Severe depression    Diagnosis:    1. Major depressive disorder, recurrent episode, moderate ([address]    2.  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        Pt Behavioral Change Plan:  Pt set the following goals:  Pt scheduled F/U visit in one week  See section 'A'
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
## 2021-09-28 ENCOUNTER — OFFICE VISIT (OUTPATIENT)
Dept: PSYCHOLOGY | Age: 26
End: 2021-09-28
Payer: MEDICARE

## 2021-09-28 DIAGNOSIS — F33.2 SEVERE EPISODE OF RECURRENT MAJOR DEPRESSIVE DISORDER, WITHOUT PSYCHOTIC FEATURES (HCC): Primary | ICD-10-CM

## 2021-09-28 DIAGNOSIS — F43.10 PTSD (POST-TRAUMATIC STRESS DISORDER): ICD-10-CM

## 2021-09-28 PROCEDURE — 1036F TOBACCO NON-USER: CPT | Performed by: PSYCHOLOGIST

## 2021-09-28 PROCEDURE — 90832 PSYTX W PT 30 MINUTES: CPT | Performed by: PSYCHOLOGIST

## 2021-09-29 NOTE — PROGRESS NOTES
intact  Attention/Concentration: intact during session  Psychomotor Activity: normal  Eye Contact: normal  Speech: normal rate and volume, well-articulated  Mood: anxious  Affect: congruent  Perception: within normal limits  Thought Content: within normal limits  Thought Process: logical, coherent  Insight: good  Judgment: intact  Ability to understand instructions: Yes  Ability to respond meaningfully: Yes  Morbid Ideation: passive thoughts of death  Suicide Assessment: suicidal ideation without plan or intent  Homicidal Ideation: no    A:  We discussed more communication and conflict management tools for her relationship and some for parenting as well. We talked about how her low self-reliance feelings may be associated with messages she told herself while she struggled with ADHD issues. We will review her SASI next visit and discuss resuming medication options, while monitoring her thoughts about food and weight. ROSE 7 SCORE 9/20/2021 9/13/2021 8/15/2019 1/3/2019 10/25/2018 8/28/2018   ROSE-7 Total Score 19 18 17 17 18 20     Interpretation of ROSE-7 score: 5-9 = mild anxiety, 10-14 = moderate anxiety, 15+ = severe anxiety. Recommend referral to behavioral health for scores 10 or greater. PHQ Scores 9/20/2021 9/13/2021 4/30/2020 8/15/2019 7/26/2019 1/3/2019 10/25/2018   PHQ2 Score 5 5 2 4 0 3 4   PHQ9 Score 26 25 2 22 0 21 18     Interpretation of Total Score Depression Severity: 1-4 = Minimal depression, 5-9 = Mild depression, 10-14 = Moderate depression, 15-19 = Moderately severe depression, 20-27 = Severe depression    Diagnosis:    1. Severe episode of recurrent major depressive disorder, without psychotic features ([facility] [address]    2.  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        Pt Behavioral Change Plan:  Pt set the following goals:  Pt scheduled F/U visit in one week  See section 'A'

## 2021-09-29 NOTE — PATIENT INSTRUCTIONS
The [address]) Consultant giving you this message provides team-based care to treat the mind and body. He works directly with your doctor, who will always stay in charge of your care. Most [address] visits are 30 minutes or less. Usually, the [address] [place] [address] provider and your doctor continue to see you until you are starting to do better and have a good plan in place for continued progress. Once that happens, most patients follow-up with just their doctor (though the [address] [place] [address] provider remains available to you as needed). If you are not improving, or if you desire outside mental health treatment, or if your doctor recommends more specialized help, we will be happy to help you find a mental health specialist in the community. Please also note your health insurance may be billed for [address] visits. Check with your insurance company, and tell the [address] [place] [address] provider, if you have any questions about your [address] [place] [address] coverage. Assertive Communication     Assertiveness Is Simple but Hard    NonAssertive   Assertive   Aggressive     (Passive)            (Tactful)             (Rude)           H onest         X  H onest          X  H onest             X A ppropriate        X  A ppropriate                 A ppropriate             X R espectful        X  R espectful             R espectful     D irect                              X  D irect        X  D irect      Assertiveness involves respecting your rights and the rights of others. Important Facts About Assertiveness      Use I or me statements such as When you do ______, I feel _____.     Voice tone, eye contact, and body posture are important parts of assertive communication.  Use a steady and calm voice, stand or sit up straight, look the other person in the eyes without glaring.     Feelings are usually only one word (e.g. angry, anxious, happy, sad, hurt, frustrated, joyful)    Remember, assertiveness doesnt guarantee that you will get what you want or that the other person will understand your concerns or be happy with what you said. It does improve the chances that the other person will understand what you want or how you feel and thus improve your chances of communicating effectively. Four Essential Steps to Assertive Communication   1. Tell the person what you think about their behavior without accusing them. 2. Tell them how you feel when they behave a certain way. 3. Tell them how their behavior affects you and your relationship with them. 4. Tell them what you would prefer them to do instead. XYZ* Formula for Effective Communication   The goal of the XYZ* formula is to express the way you feel (internal world) in response to others behavior (external world) in specific situations. You are the only person who has access to your feelings. Others have no access to your internal world. The only way they will know what you are feeling is if you tell them. Similarly, you only have access to other peoples external world. It is very easy to make a mistake when trying to guess what others are feeling or intending. I feel X  when you do Y  in situation Z  and I would like *    I feel angry  when you leave your socks and underwear on the bedroom floor  after work  and I would like you to put them in the hamper. I felt insignificant  when you left me with an empty gas tank  yesterday  and I would like you to leave the car with at least 1/4 tank of gas. I feel angry  when you dont call me  if you are staying late at work  and I would like you to call as soon as you know you will be late. I feel loved  when you kiss me  when you get home  and I would like you to do that everyday.

## 2021-10-04 ENCOUNTER — OFFICE VISIT (OUTPATIENT)
Dept: PSYCHOLOGY | Age: 26
End: 2021-10-04
Payer: MEDICARE

## 2021-10-04 DIAGNOSIS — F33.1 MAJOR DEPRESSIVE DISORDER, RECURRENT EPISODE, MODERATE (HCC): ICD-10-CM

## 2021-10-04 DIAGNOSIS — F90.2 ATTENTION DEFICIT HYPERACTIVITY DISORDER, COMBINED TYPE, MODERATE: Primary | ICD-10-CM

## 2021-10-04 PROCEDURE — 1036F TOBACCO NON-USER: CPT | Performed by: PSYCHOLOGIST

## 2021-10-04 PROCEDURE — 90832 PSYTX W PT 30 MINUTES: CPT | Performed by: PSYCHOLOGIST

## 2021-10-04 ASSESSMENT — PATIENT HEALTH QUESTIONNAIRE - PHQ9
1. LITTLE INTEREST OR PLEASURE IN DOING THINGS: 3
SUM OF ALL RESPONSES TO PHQ9 QUESTIONS 1 & 2: 5
7. TROUBLE CONCENTRATING ON THINGS, SUCH AS READING THE NEWSPAPER OR WATCHING TELEVISION: 3
4. FEELING TIRED OR HAVING LITTLE ENERGY: 2
6. FEELING BAD ABOUT YOURSELF - OR THAT YOU ARE A FAILURE OR HAVE LET YOURSELF OR YOUR FAMILY DOWN: 2
5. POOR APPETITE OR OVEREATING: 3
9. THOUGHTS THAT YOU WOULD BE BETTER OFF DEAD, OR OF HURTING YOURSELF: 2
8. MOVING OR SPEAKING SO SLOWLY THAT OTHER PEOPLE COULD HAVE NOTICED. OR THE OPPOSITE, BEING SO FIGETY OR RESTLESS THAT YOU HAVE BEEN MOVING AROUND A LOT MORE THAN USUAL: 3
SUM OF ALL RESPONSES TO PHQ QUESTIONS 1-9: 22
3. TROUBLE FALLING OR STAYING ASLEEP: 2
SUM OF ALL RESPONSES TO PHQ QUESTIONS 1-9: 20
SUM OF ALL RESPONSES TO PHQ QUESTIONS 1-9: 22
2. FEELING DOWN, DEPRESSED OR HOPELESS: 2

## 2021-10-04 ASSESSMENT — ANXIETY QUESTIONNAIRES
1. FEELING NERVOUS, ANXIOUS, OR ON EDGE: 2-OVER HALF THE DAYS
7. FEELING AFRAID AS IF SOMETHING AWFUL MIGHT HAPPEN: 3-NEARLY EVERY DAY
3. WORRYING TOO MUCH ABOUT DIFFERENT THINGS: 2-OVER HALF THE DAYS
5. BEING SO RESTLESS THAT IT IS HARD TO SIT STILL: 1-SEVERAL DAYS
4. TROUBLE RELAXING: 2-OVER HALF THE DAYS
2. NOT BEING ABLE TO STOP OR CONTROL WORRYING: 2-OVER HALF THE DAYS
6. BECOMING EASILY ANNOYED OR IRRITABLE: 3-NEARLY EVERY DAY
GAD7 TOTAL SCORE: 15

## 2021-10-04 NOTE — PROGRESS NOTES
Behavioral Health Consultation  Luis E Ortiz, Ph.D.  Psychologist  10/4/2021  1:30 PM EDT      Time spent with Patient: 30 minutes  This is patient's fourth [facility] appointment. Reason for Consult: depression, stress  Referring Provider: AMMY Ewing - CNP  [address]  [address] [id number]    Feedback for PCP:     Pt provided informed consent for the behavioral health program. Discussed with patient model of service to include the limits of confidentiality (i.e. abuse reporting, suicide intervention, etc.) and short-term intervention focused approach. Pt indicated understanding. Feedback given to PCP. S:  During this visit we reviewed her SASI assessment to help uncover, explain her symptom pattern. Since her ADHD wasn't diagnosed until she was an adult, the frustrations, emotional reactivity, low perceived competence, feelings of low self-reliance all can be associated with what it was like to be \"the smart pretty girl\" who consistently underachieved due to her neurodevelopment. She has wanted to understand how she got where she is emotionally and behaviorally and has many questions.  For example she asked how it could be that her fiance who also has ADHD can \"just get up and start doing the dishes and I don't seem to be able to do that?'         Social History     Tobacco Use    Smoking status: Never Smoker    Smokeless tobacco: Never Used   Substance Use Topics    Alcohol use: Yes     Comment: socially         Illicit drugs:   Social History     Substance and Sexual Activity   Drug Use No        O:  MSE:  Appearance: good hygiene   Attitude: cooperative and in distress  Consciousness: alert  Orientation: oriented to person, place, time, general circumstance  Memory: recent and remote memory intact  Attention/Concentration: intact during session  Psychomotor Activity: normal  Eye Contact: normal  Speech: normal rate and volume, well-articulated  Mood: anxious  Affect: congruent  Perception: within normal limits  Thought Content: within normal limits  Thought Process: logical, coherent  Insight: good  Judgment: intact  Ability to understand instructions: Yes  Ability to respond meaningfully: Yes  Morbid Ideation: passive thoughts of death  Suicide Assessment: suicidal ideation without plan or intent  Homicidal Ideation: no    A:  We discussed her symptom pattern with respect to untreated ADHD reflected on her SASI, and she found that the connection to her current mental health and her experience as an untreated adult woman made sense to her. She asked how it was that her fiance could \"just get up and do the dishes? \" and this discussion led to gender differences between expressions and issues around ADHD. Her fiance likely doesn't really care how well he does the dishes and feels no expectations about it, whereas expectations of herself around some domestic task have stigma attached to them. We'll continue to problem-solve executive function issues. ROSE 7 SCORE 10/4/2021 9/20/2021 9/13/2021 8/15/2019 1/3/2019 10/25/2018 8/28/2018   ROSE-7 Total Score 15 19 18 17 17 18 20     Interpretation of ROSE-7 score: 5-9 = mild anxiety, 10-14 = moderate anxiety, 15+ = severe anxiety. Recommend referral to behavioral health for scores 10 or greater. PHQ Scores 10/4/2021 9/20/2021 9/13/2021 4/30/2020 8/15/2019 7/26/2019 1/3/2019   PHQ2 Score 5 5 5 2 4 0 3   PHQ9 Score 22 26 25 2 22 0 21     Interpretation of Total Score Depression Severity: 1-4 = Minimal depression, 5-9 = Mild depression, 10-14 = Moderate depression, 15-19 = Moderately severe depression, 20-27 = Severe depression    Diagnosis:    1. Attention deficit hyperactivity disorder, combined type, moderate    2.  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

## 2021-10-06 ENCOUNTER — VIRTUAL VISIT (OUTPATIENT)
Dept: PRIMARY CARE CLINIC | Age: 26
End: 2021-10-06
Payer: MEDICARE

## 2021-10-06 DIAGNOSIS — K21.9 GASTROESOPHAGEAL REFLUX DISEASE, UNSPECIFIED WHETHER ESOPHAGITIS PRESENT: ICD-10-CM

## 2021-10-06 DIAGNOSIS — R43.2 LOSS OF TASTE: ICD-10-CM

## 2021-10-06 DIAGNOSIS — R11.0 NAUSEA: Primary | ICD-10-CM

## 2021-10-06 PROCEDURE — G8427 DOCREV CUR MEDS BY ELIG CLIN: HCPCS | Performed by: NURSE PRACTITIONER

## 2021-10-06 PROCEDURE — 99214 OFFICE O/P EST MOD 30 MIN: CPT | Performed by: NURSE PRACTITIONER

## 2021-10-06 RX ORDER — OMEPRAZOLE 20 MG/1
20 CAPSULE, DELAYED RELEASE ORAL
Qty: 90 CAPSULE | Refills: 1 | Status: SHIPPED | OUTPATIENT
Start: 2021-10-06

## 2021-10-06 ASSESSMENT — ENCOUNTER SYMPTOMS
DIARRHEA: 1
ABDOMINAL PAIN: 1
NAUSEA: 1
ABDOMINAL DISTENTION: 0

## 2021-10-06 NOTE — PROGRESS NOTES
10/6/2021    TELEHEALTH EVALUATION -- Audio/Visual (During BAHVV-09 public health emergency)    HPI:    Manny Both (: 1995) has requested an audio/video evaluation for the following concern(s):    Patient is having lack of appetite. Patient said that it could be her anxiety. She said that when she eats she is hungry or craving food. Patient said she is having stomach pain. Patient said she is having loose stool 2 times a day. Patient has not taken ibuprofen in the last month. Patient does typically. Review of Systems   Gastrointestinal: Positive for abdominal pain, diarrhea and nausea. Negative for abdominal distention. Psychiatric/Behavioral: The patient is nervous/anxious. All other systems reviewed and are negative. PHYSICAL EXAMINATION:  [ INSTRUCTIONS:  \"[x]\" Indicates a positive item  \"[]\" Indicates a negative item  -- DELETE ALL ITEMS NOT EXAMINED]  Vital Signs: (As obtained by patient/caregiver or practitioner observation)    Patient-Reported Vitals 10/6/2021   Patient-Reported Weight 121.2   Patient-Reported Height 5'1   Patient-Reported Systolic 804   Patient-Reported Diastolic 72   Patient-Reported Pulse 104   Patient-Reported Temperature 98.1   Patient-Reported SpO2 99         Constitutional: [x] Appears well-developed and well-nourished [x] No apparent distress      [] Abnormal-   Mental status  [x] Alert and awake  [x] Oriented to person/place/time [x]Able to follow commands      Eyes:  EOM    []  Normal  [] Abnormal-  Sclera  []  Normal  [] Abnormal -         Discharge []  None visible  [] Abnormal -    HENT:   [x] Normocephalic, atraumatic.   [] Abnormal   [] Mouth/Throat: Mucous membranes are moist.     External Ears [] Normal  [] Abnormal-     Neck: [] No visualized mass     Pulmonary/Chest: [x] Respiratory effort normal.  [x] No visualized signs of difficulty breathing or respiratory distress        [] Abnormal-      Musculoskeletal:   [x] Normal gait with no signs of ataxia         [] Normal range of motion of neck        [] Abnormal-       Neurological:        [x] No Facial Asymmetry (Cranial nerve 7 motor function) (limited exam to video visit)          [] No gaze palsy        [] Abnormal-         Skin:        [x] No significant exanthematous lesions or discoloration noted on facial skin         [] Abnormal-            Psychiatric:       [x] Normal Affect [x] No Hallucinations        [] Abnormal-     Other pertinent observable physical exam findings-     ASSESSMENT/PLAN:  1. Nausea  -Suspect patient's nausea and weight loss could be related to gastric ulcer. Patient says even when she is hungry she eats and gets full and feels stomach pain right after she eats. Patient agrees  - omeprazole (PRILOSEC) 20 MG delayed release capsule; Take 1 capsule by mouth every morning (before breakfast)  Dispense: 90 capsule; Refill: 1    2. Gastroesophageal reflux disease, unspecified whether esophagitis present  -We will send in Prilosec for patient for possible gastric reflux she says she does have some heartburn. Patient agrees to start taking this for a month we will check on patient on Monday. If symptoms persist will send patient to GI.  - omeprazole (PRILOSEC) 20 MG delayed release capsule; Take 1 capsule by mouth every morning (before breakfast)  Dispense: 90 capsule; Refill: 1    3. Loss of taste  Patient did have a period time where she had Covid-like symptoms in which she was tested and was negative. Patient says she is lost her taste and smell and is concerned that she could have had Covid patient has been vaccinated so therefore could be a false positive antibody test but will check. - Covid-19, Antibody, Total; Future      No follow-ups on file. Rubia Haile is a 23 y.o. female being evaluated by a Virtual Visit (video visit) encounter to address concerns as mentioned above. A caregiver was present when appropriate.  Due to this being a TeleHealth encounter (During SRDKF-03 public health emergency), evaluation of the following organ systems was limited: Vitals/Constitutional/EENT/Resp/CV/GI//MS/Neuro/Skin/Heme-Lymph-Imm. Pursuant to the emergency declaration under the [facility], [address] [place] [address] and the [employer] Act, this Virtual Visit was conducted with patient's (and/or legal guardian's) consent, to reduce the patient's risk of exposure to COVID-19 and provide necessary medical care. The patient (and/or legal guardian) has also been advised to contact this office for worsening conditions or problems, and seek emergency medical treatment and/or call 911 if deemed necessary. Patient identification was verified at the start of the visit: Yes    Not billed for time. Services were provided through a video synchronous discussion virtually to substitute for in-person clinic visit. Patient and provider were located at their individual homes. --AMMY Crawford - CNP on 10/6/2021 at 4:39 PM    An electronic signature was used to authenticate this note. This dictation was generated by voice recognition computer software. Although all attempts are made to edit the dictation for accuracy, there may be errors in the transcription that were not intended.

## 2021-10-11 ENCOUNTER — OFFICE VISIT (OUTPATIENT)
Dept: PSYCHOLOGY | Age: 26
End: 2021-10-11
Payer: MEDICARE

## 2021-10-11 DIAGNOSIS — F43.10 PTSD (POST-TRAUMATIC STRESS DISORDER): Primary | ICD-10-CM

## 2021-10-11 DIAGNOSIS — F33.1 MAJOR DEPRESSIVE DISORDER, RECURRENT EPISODE, MODERATE (HCC): ICD-10-CM

## 2021-10-11 PROCEDURE — 90832 PSYTX W PT 30 MINUTES: CPT | Performed by: PSYCHOLOGIST

## 2021-10-11 PROCEDURE — 1036F TOBACCO NON-USER: CPT | Performed by: PSYCHOLOGIST

## 2021-10-19 ENCOUNTER — OFFICE VISIT (OUTPATIENT)
Dept: PSYCHOLOGY | Age: 26
End: 2021-10-19
Payer: MEDICARE

## 2021-10-19 DIAGNOSIS — F33.1 MAJOR DEPRESSIVE DISORDER, RECURRENT EPISODE, MODERATE (HCC): Primary | ICD-10-CM

## 2021-10-19 DIAGNOSIS — Z63.9 RELATIONSHIP DYSFUNCTION: ICD-10-CM

## 2021-10-19 PROCEDURE — 1036F TOBACCO NON-USER: CPT | Performed by: PSYCHOLOGIST

## 2021-10-19 PROCEDURE — 90832 PSYTX W PT 30 MINUTES: CPT | Performed by: PSYCHOLOGIST

## 2021-10-19 SDOH — SOCIAL STABILITY - SOCIAL INSECURITY: PROBLEM RELATED TO PRIMARY SUPPORT GROUP, UNSPECIFIED: Z63.9
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
## 2021-10-25 ENCOUNTER — OFFICE VISIT (OUTPATIENT)
Dept: PSYCHOLOGY | Age: 26
End: 2021-10-25
Payer: MEDICARE

## 2021-10-25 DIAGNOSIS — F33.1 MAJOR DEPRESSIVE DISORDER, RECURRENT EPISODE, MODERATE (HCC): Primary | ICD-10-CM

## 2021-10-25 DIAGNOSIS — F90.2 ATTENTION DEFICIT HYPERACTIVITY DISORDER, COMBINED TYPE, MODERATE: ICD-10-CM

## 2021-10-25 PROCEDURE — 90832 PSYTX W PT 30 MINUTES: CPT | Performed by: PSYCHOLOGIST

## 2021-10-25 PROCEDURE — 1036F TOBACCO NON-USER: CPT | Performed by: PSYCHOLOGIST

## 2021-10-25 ASSESSMENT — PATIENT HEALTH QUESTIONNAIRE - PHQ9
1. LITTLE INTEREST OR PLEASURE IN DOING THINGS: 3
7. TROUBLE CONCENTRATING ON THINGS, SUCH AS READING THE NEWSPAPER OR WATCHING TELEVISION: 3
SUM OF ALL RESPONSES TO PHQ QUESTIONS 1-9: 20
SUM OF ALL RESPONSES TO PHQ QUESTIONS 1-9: 23
3. TROUBLE FALLING OR STAYING ASLEEP: 2
SUM OF ALL RESPONSES TO PHQ QUESTIONS 1-9: 23
4. FEELING TIRED OR HAVING LITTLE ENERGY: 2
9. THOUGHTS THAT YOU WOULD BE BETTER OFF DEAD, OR OF HURTING YOURSELF: 3
6. FEELING BAD ABOUT YOURSELF - OR THAT YOU ARE A FAILURE OR HAVE LET YOURSELF OR YOUR FAMILY DOWN: 3
SUM OF ALL RESPONSES TO PHQ9 QUESTIONS 1 & 2: 6
2. FEELING DOWN, DEPRESSED OR HOPELESS: 3
8. MOVING OR SPEAKING SO SLOWLY THAT OTHER PEOPLE COULD HAVE NOTICED. OR THE OPPOSITE, BEING SO FIGETY OR RESTLESS THAT YOU HAVE BEEN MOVING AROUND A LOT MORE THAN USUAL: 2
5. POOR APPETITE OR OVEREATING: 2

## 2021-10-25 ASSESSMENT — ANXIETY QUESTIONNAIRES
3. WORRYING TOO MUCH ABOUT DIFFERENT THINGS: 3-NEARLY EVERY DAY
2. NOT BEING ABLE TO STOP OR CONTROL WORRYING: 3-NEARLY EVERY DAY
6. BECOMING EASILY ANNOYED OR IRRITABLE: 2-OVER HALF THE DAYS
4. TROUBLE RELAXING: 2-OVER HALF THE DAYS
1. FEELING NERVOUS, ANXIOUS, OR ON EDGE: 2-OVER HALF THE DAYS
7. FEELING AFRAID AS IF SOMETHING AWFUL MIGHT HAPPEN: 3-NEARLY EVERY DAY
5. BEING SO RESTLESS THAT IT IS HARD TO SIT STILL: 2-OVER HALF THE DAYS
GAD7 TOTAL SCORE: 17
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
## 2021-10-29 DIAGNOSIS — R63.4 WEIGHT LOSS: Primary | ICD-10-CM

## 2021-11-01 ENCOUNTER — OFFICE VISIT (OUTPATIENT)
Dept: PSYCHOLOGY | Age: 26
End: 2021-11-01
Payer: MEDICARE

## 2021-11-01 DIAGNOSIS — F33.1 MAJOR DEPRESSIVE DISORDER, RECURRENT EPISODE, MODERATE (HCC): Primary | ICD-10-CM

## 2021-11-01 DIAGNOSIS — R43.2 LOSS OF TASTE: ICD-10-CM

## 2021-11-01 DIAGNOSIS — R63.4 WEIGHT LOSS: ICD-10-CM

## 2021-11-01 DIAGNOSIS — F90.2 ATTENTION DEFICIT HYPERACTIVITY DISORDER, COMBINED TYPE, MODERATE: ICD-10-CM

## 2021-11-01 LAB
A/G RATIO: 2.2 (ref 1.1–2.2)
ALBUMIN SERPL-MCNC: 5 G/DL (ref 3.4–5)
ALP BLD-CCNC: 66 U/L (ref 40–129)
ALT SERPL-CCNC: 9 U/L (ref 10–40)
ANION GAP SERPL CALCULATED.3IONS-SCNC: 16 MMOL/L (ref 3–16)
AST SERPL-CCNC: 12 U/L (ref 15–37)
BASOPHILS ABSOLUTE: 0 K/UL (ref 0–0.2)
BASOPHILS RELATIVE PERCENT: 0.6 %
BILIRUB SERPL-MCNC: <0.2 MG/DL (ref 0–1)
BUN BLDV-MCNC: 5 MG/DL (ref 7–20)
CALCIUM SERPL-MCNC: 9.8 MG/DL (ref 8.3–10.6)
CHLORIDE BLD-SCNC: 101 MMOL/L (ref 99–110)
CO2: 23 MMOL/L (ref 21–32)
CREAT SERPL-MCNC: 0.6 MG/DL (ref 0.6–1.1)
EOSINOPHILS ABSOLUTE: 0.1 K/UL (ref 0–0.6)
EOSINOPHILS RELATIVE PERCENT: 1.6 %
GFR AFRICAN AMERICAN: >60
GFR NON-AFRICAN AMERICAN: >60
GLUCOSE BLD-MCNC: 88 MG/DL (ref 70–99)
HCT VFR BLD CALC: 37.8 % (ref 36–48)
HEMOGLOBIN: 12.8 G/DL (ref 12–16)
LYMPHOCYTES ABSOLUTE: 1.5 K/UL (ref 1–5.1)
LYMPHOCYTES RELATIVE PERCENT: 29.8 %
MCH RBC QN AUTO: 28.6 PG (ref 26–34)
MCHC RBC AUTO-ENTMCNC: 33.9 G/DL (ref 31–36)
MCV RBC AUTO: 84.4 FL (ref 80–100)
MONOCYTES ABSOLUTE: 0.4 K/UL (ref 0–1.3)
MONOCYTES RELATIVE PERCENT: 7 %
NEUTROPHILS ABSOLUTE: 3.1 K/UL (ref 1.7–7.7)
NEUTROPHILS RELATIVE PERCENT: 61 %
PDW BLD-RTO: 14 % (ref 12.4–15.4)
PLATELET # BLD: 238 K/UL (ref 135–450)
PMV BLD AUTO: 9.9 FL (ref 5–10.5)
POTASSIUM SERPL-SCNC: 4.3 MMOL/L (ref 3.5–5.1)
RBC # BLD: 4.48 M/UL (ref 4–5.2)
SARS-COV-2 ANTIBODY, TOTAL: NEGATIVE
SODIUM BLD-SCNC: 140 MMOL/L (ref 136–145)
TOTAL PROTEIN: 7.3 G/DL (ref 6.4–8.2)
TSH REFLEX: 0.9 UIU/ML (ref 0.27–4.2)
VITAMIN D 25-HYDROXY: 31.7 NG/ML
WBC # BLD: 5 K/UL (ref 4–11)

## 2021-11-01 PROCEDURE — 1036F TOBACCO NON-USER: CPT | Performed by: PSYCHOLOGIST

## 2021-11-01 PROCEDURE — 90832 PSYTX W PT 30 MINUTES: CPT | Performed by: PSYCHOLOGIST

## 2021-11-01 ASSESSMENT — PATIENT HEALTH QUESTIONNAIRE - PHQ9
4. FEELING TIRED OR HAVING LITTLE ENERGY: 2
3. TROUBLE FALLING OR STAYING ASLEEP: 1
SUM OF ALL RESPONSES TO PHQ QUESTIONS 1-9: 18
SUM OF ALL RESPONSES TO PHQ QUESTIONS 1-9: 18
6. FEELING BAD ABOUT YOURSELF - OR THAT YOU ARE A FAILURE OR HAVE LET YOURSELF OR YOUR FAMILY DOWN: 2
SUM OF ALL RESPONSES TO PHQ QUESTIONS 1-9: 16
9. THOUGHTS THAT YOU WOULD BE BETTER OFF DEAD, OR OF HURTING YOURSELF: 2
7. TROUBLE CONCENTRATING ON THINGS, SUCH AS READING THE NEWSPAPER OR WATCHING TELEVISION: 3
SUM OF ALL RESPONSES TO PHQ9 QUESTIONS 1 & 2: 3
8. MOVING OR SPEAKING SO SLOWLY THAT OTHER PEOPLE COULD HAVE NOTICED. OR THE OPPOSITE, BEING SO FIGETY OR RESTLESS THAT YOU HAVE BEEN MOVING AROUND A LOT MORE THAN USUAL: 3
2. FEELING DOWN, DEPRESSED OR HOPELESS: 1
5. POOR APPETITE OR OVEREATING: 2
1. LITTLE INTEREST OR PLEASURE IN DOING THINGS: 2

## 2021-11-01 ASSESSMENT — ANXIETY QUESTIONNAIRES
5. BEING SO RESTLESS THAT IT IS HARD TO SIT STILL: 2-OVER HALF THE DAYS
7. FEELING AFRAID AS IF SOMETHING AWFUL MIGHT HAPPEN: 3-NEARLY EVERY DAY
6. BECOMING EASILY ANNOYED OR IRRITABLE: 3-NEARLY EVERY DAY
2. NOT BEING ABLE TO STOP OR CONTROL WORRYING: 2-OVER HALF THE DAYS
3. WORRYING TOO MUCH ABOUT DIFFERENT THINGS: 2-OVER HALF THE DAYS
1. FEELING NERVOUS, ANXIOUS, OR ON EDGE: 2-OVER HALF THE DAYS
GAD7 TOTAL SCORE: 16
4. TROUBLE RELAXING: 2-OVER HALF THE DAYS
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
## 2021-11-08 ENCOUNTER — OFFICE VISIT (OUTPATIENT)
Dept: PSYCHOLOGY | Age: 26
End: 2021-11-08
Payer: MEDICARE

## 2021-11-08 DIAGNOSIS — F90.2 ATTENTION DEFICIT HYPERACTIVITY DISORDER, COMBINED TYPE, MODERATE: ICD-10-CM

## 2021-11-08 DIAGNOSIS — F33.2 SEVERE EPISODE OF RECURRENT MAJOR DEPRESSIVE DISORDER, WITHOUT PSYCHOTIC FEATURES (HCC): Primary | ICD-10-CM

## 2021-11-08 DIAGNOSIS — F40.10 SOCIAL ANXIETY DISORDER: ICD-10-CM

## 2021-11-08 PROCEDURE — 1036F TOBACCO NON-USER: CPT | Performed by: PSYCHOLOGIST

## 2021-11-08 PROCEDURE — 90832 PSYTX W PT 30 MINUTES: CPT | Performed by: PSYCHOLOGIST

## 2021-11-08 ASSESSMENT — ANXIETY QUESTIONNAIRES
GAD7 TOTAL SCORE: 15
1. FEELING NERVOUS, ANXIOUS, OR ON EDGE: 2-OVER HALF THE DAYS
4. TROUBLE RELAXING: 2-OVER HALF THE DAYS
3. WORRYING TOO MUCH ABOUT DIFFERENT THINGS: 2-OVER HALF THE DAYS
2. NOT BEING ABLE TO STOP OR CONTROL WORRYING: 2-OVER HALF THE DAYS
5. BEING SO RESTLESS THAT IT IS HARD TO SIT STILL: 2-OVER HALF THE DAYS
6. BECOMING EASILY ANNOYED OR IRRITABLE: 2-OVER HALF THE DAYS
7. FEELING AFRAID AS IF SOMETHING AWFUL MIGHT HAPPEN: 3-NEARLY EVERY DAY

## 2021-11-08 ASSESSMENT — PATIENT HEALTH QUESTIONNAIRE - PHQ9
1. LITTLE INTEREST OR PLEASURE IN DOING THINGS: 1
9. THOUGHTS THAT YOU WOULD BE BETTER OFF DEAD, OR OF HURTING YOURSELF: 2
SUM OF ALL RESPONSES TO PHQ QUESTIONS 1-9: 17
4. FEELING TIRED OR HAVING LITTLE ENERGY: 1
2. FEELING DOWN, DEPRESSED OR HOPELESS: 2
SUM OF ALL RESPONSES TO PHQ QUESTIONS 1-9: 19
SUM OF ALL RESPONSES TO PHQ9 QUESTIONS 1 & 2: 3
6. FEELING BAD ABOUT YOURSELF - OR THAT YOU ARE A FAILURE OR HAVE LET YOURSELF OR YOUR FAMILY DOWN: 3
7. TROUBLE CONCENTRATING ON THINGS, SUCH AS READING THE NEWSPAPER OR WATCHING TELEVISION: 3
8. MOVING OR SPEAKING SO SLOWLY THAT OTHER PEOPLE COULD HAVE NOTICED. OR THE OPPOSITE, BEING SO FIGETY OR RESTLESS THAT YOU HAVE BEEN MOVING AROUND A LOT MORE THAN USUAL: 3
3. TROUBLE FALLING OR STAYING ASLEEP: 1
5. POOR APPETITE OR OVEREATING: 3
SUM OF ALL RESPONSES TO PHQ QUESTIONS 1-9: 19

## 2021-11-08 NOTE — PATIENT INSTRUCTIONS
The [facility] Consultant giving you this message provides team-based care to treat the mind and body. He works directly with your doctor, who will always stay in charge of your care. Most [address] visits are 30 minutes or less. Usually, the [facility] [address] provider and your doctor continue to see you until you are starting to do better and have a good plan in place for continued progress. Once that happens, most patients follow-up with just their doctor (though the [facility] [address] provider remains available to you as needed). If you are not improving, or if you desire outside mental health treatment, or if your doctor recommends more specialized help, we will be happy to help you find a mental health specialist in the community. Please also note your health insurance may be billed for [address] visits. Check with your insurance company, and tell the [facility] [address] provider, if you have any questions about your [address] coverage.         [email or web address]

## 2021-11-08 NOTE — PROGRESS NOTES
Behavioral Health Consultation  Berta Ram, Ph.D.  Psychologist  11/8/2021  10:30 AM EDT      Time spent with Patient: 30 minutes  This is patient's ninth [facility] appointment. Reason for Consult: depression, stress  Referring Provider: AMMY Metzger - MICHELLE [address]  [id number] [email or web address] [id number]    Feedback for PCP:     Pt provided informed consent for the behavioral health program. Discussed with patient model of service to include the limits of confidentiality (i.e. abuse reporting, suicide intervention, etc.) and short-term intervention focused approach. Pt indicated understanding. Feedback given to PCP. S:  Patient says, \" this week hasn't been too bad. \" She said that on some days she wants to \"let my guard down with my fiance but I know that is not a good idea. \" She says that he has made many improvements in things he does around the house but it sounds as though his empathic failures and communicated disrespect to the patient remains unchanged, as does their angry dynamic where he provokes her, she gets triggered, and then he points the finger at her as \"the crazy one. \"        Social History     Tobacco Use    Smoking status: Never Smoker    Smokeless tobacco: Never Used   Substance Use Topics    Alcohol use: Yes     Comment: socially         Illicit drugs:   Social History     Substance and Sexual Activity   Drug Use No        O:  MSE:  Appearance: good hygiene   Attitude: cooperative and in distress  Consciousness: alert  Orientation: oriented to person, place, time, general circumstance  Memory: recent and remote memory intact  Attention/Concentration: intact during session  Psychomotor Activity: normal  Eye Contact: normal  Speech: normal rate and volume, well-articulated  Mood: anxious  Affect: congruent  Perception: within normal limits  Thought Content: within normal limits  Thought Process: logical, coherent  Insight: good  Judgment: intact  Ability to understand instructions: Yes  Ability to respond meaningfully: Yes  Morbid Ideation: passive thoughts of death  Suicide Assessment: suicidal ideation without plan or intent  Homicidal Ideation: no    A:  The patient is incrementally discovering that other behavior choices are possible but she is at the very beginning of wading through the trauma of her childhood, the emotional consequences of her undiagnosed ADHD, and being so emotionally dysregulated at times, she has SI. We are identifying small 'wins' when she controls her emotions instead of automatically reacting. ROSE 7 SCORE 11/8/2021 11/1/2021 10/25/2021 10/4/2021 9/20/2021 9/13/2021 8/15/2019   ROSE-7 Total Score 15 16 17 15 19 18 17     Interpretation of ROSE-7 score: 5-9 = mild anxiety, 10-14 = moderate anxiety, 15+ = severe anxiety. Recommend referral to behavioral health for scores 10 or greater. PHQ Scores 11/8/2021 11/1/2021 10/25/2021 10/4/2021 9/20/2021 9/13/2021 4/30/2020   PHQ2 Score 3 3 6 5 5 5 2   PHQ9 Score 19 18 23 22 26 25 2     Interpretation of Total Score Depression Severity: 1-4 = Minimal depression, 5-9 = Mild depression, 10-14 = Moderate depression, 15-19 = Moderately severe depression, 20-27 = Severe depression    Diagnosis:    1. Severe episode of recurrent major depressive disorder, without psychotic features ([facility] [address]    2. Social anxiety disorder    3. 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        Pt Behavioral Change Plan:  Pt set the following goals:  Pt scheduled F/U visit in one week  See section 'A'

## 2021-11-15 ENCOUNTER — OFFICE VISIT (OUTPATIENT)
Dept: PSYCHOLOGY | Age: 26
End: 2021-11-15
Payer: MEDICARE

## 2021-11-15 DIAGNOSIS — F43.10 PTSD (POST-TRAUMATIC STRESS DISORDER): ICD-10-CM

## 2021-11-15 DIAGNOSIS — F33.1 MAJOR DEPRESSIVE DISORDER, RECURRENT EPISODE, MODERATE (HCC): Primary | ICD-10-CM

## 2021-11-15 DIAGNOSIS — F90.2 ATTENTION DEFICIT HYPERACTIVITY DISORDER, COMBINED TYPE, MODERATE: ICD-10-CM

## 2021-11-15 PROCEDURE — 90832 PSYTX W PT 30 MINUTES: CPT | Performed by: PSYCHOLOGIST

## 2021-11-15 PROCEDURE — 1036F TOBACCO NON-USER: CPT | Performed by: PSYCHOLOGIST

## 2021-11-15 NOTE — PROGRESS NOTES
Behavioral Health Consultation  Clayton Patton, Ph.D.  Psychologist  11/15/2021  10:30 AM EDT      Time spent with Patient: 30 minutes  This is patient's tenth [facility] appointment. Reason for Consult: depression, stress  Referring Provider: Meghna Limon APRN - CNP  [address]  [address] [employer] [id number]    Feedback for PCP:     Pt provided informed consent for the behavioral health program. Discussed with patient model of service to include the limits of confidentiality (i.e. abuse reporting, suicide intervention, etc.) and short-term intervention focused approach. Pt indicated understanding. Feedback given to PCP. S:  Patient reports that she had a hard week with emotion regulation due to several factors that accumulated for her. It started with her waking up late to take the kids to school, then a mis-match in communication with her fiance that increased her stress and anxiety, declined debit card, and other disappointments that fueled her helpless/hopeless mindset. She expresses good insight into her unreasonable assumptions and/or postures, but it seems that once she's passed a certain point emotionally, she finds it hard to get regulated again and re-engage her 'wise mind,' from [address]. She says that she feels that its taking less time for her to get centered again, but still is taking \"too long. \"         Social History     Tobacco Use    Smoking status: Never Smoker    Smokeless tobacco: Never Used   Substance Use Topics    Alcohol use: Yes     Comment: socially         Illicit drugs:   Social History     Substance and Sexual Activity   Drug Use No        O:  MSE:  Appearance: good hygiene   Attitude: cooperative and in distress  Consciousness: alert  Orientation: oriented to person, place, time, general circumstance  Memory: recent and remote memory intact  Attention/Concentration: intact during session  Psychomotor Activity: normal  Eye Contact: normal  Speech: normal rate and volume, well-articulated  Mood: anxious  Affect: congruent  Perception: within normal limits  Thought Content: within normal limits  Thought Process: logical, coherent  Insight: good  Judgment: intact  Ability to understand instructions: Yes  Ability to respond meaningfully: Yes  Morbid Ideation: passive thoughts of death  Suicide Assessment: suicidal ideation without plan or intent  Homicidal Ideation: no    A:  We looked at her meltdown by going backwards and identifying contributing and accelerating factors. She feels as though being in the relationship with her fiance narrows the band of emotional resources she has available to her. She said that at times, she can influence her emotion regulation with realistic self talk but when she is already stressed to a certain level, it becomes much harder. It helps when she can take her environment into consideration rather than placing all the blame on herself. ROSE 7 SCORE 11/8/2021 11/1/2021 10/25/2021 10/4/2021 9/20/2021 9/13/2021 8/15/2019   ROSE-7 Total Score 15 16 17 15 19 18 17     Interpretation of ROSE-7 score: 5-9 = mild anxiety, 10-14 = moderate anxiety, 15+ = severe anxiety. Recommend referral to behavioral health for scores 10 or greater. PHQ Scores 11/8/2021 11/1/2021 10/25/2021 10/4/2021 9/20/2021 9/13/2021 4/30/2020   PHQ2 Score 3 3 6 5 5 5 2   PHQ9 Score 19 18 23 22 26 25 2     Interpretation of Total Score Depression Severity: 1-4 = Minimal depression, 5-9 = Mild depression, 10-14 = Moderate depression, 15-19 = Moderately severe depression, 20-27 = Severe depression    Diagnosis:    1. Major depressive disorder, recurrent episode, moderate ([facility] [address]    2. PTSD (post-traumatic stress disorder)    3.  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        Pt Behavioral Change Plan:  Pt set the following goals:  Pt scheduled F/U visit in one week  See section 'A'

## 2021-11-22 ENCOUNTER — OFFICE VISIT (OUTPATIENT)
Dept: PSYCHOLOGY | Age: 26
End: 2021-11-22
Payer: MEDICARE

## 2021-11-22 ENCOUNTER — OFFICE VISIT (OUTPATIENT)
Dept: CARDIOLOGY CLINIC | Age: 26
End: 2021-11-22
Payer: MEDICARE

## 2021-11-22 VITALS
DIASTOLIC BLOOD PRESSURE: 68 MMHG | SYSTOLIC BLOOD PRESSURE: 116 MMHG | WEIGHT: 117 LBS | HEIGHT: 61 IN | OXYGEN SATURATION: 99 % | BODY MASS INDEX: 22.09 KG/M2 | HEART RATE: 91 BPM

## 2021-11-22 DIAGNOSIS — R00.2 PALPITATIONS: Primary | ICD-10-CM

## 2021-11-22 DIAGNOSIS — F43.10 PTSD (POST-TRAUMATIC STRESS DISORDER): ICD-10-CM

## 2021-11-22 DIAGNOSIS — F90.2 ATTENTION DEFICIT HYPERACTIVITY DISORDER, COMBINED TYPE, MODERATE: ICD-10-CM

## 2021-11-22 DIAGNOSIS — F33.1 MAJOR DEPRESSIVE DISORDER, RECURRENT EPISODE, MODERATE (HCC): Primary | ICD-10-CM

## 2021-11-22 DIAGNOSIS — R55 PRE-SYNCOPE: ICD-10-CM

## 2021-11-22 PROCEDURE — G8427 DOCREV CUR MEDS BY ELIG CLIN: HCPCS | Performed by: INTERNAL MEDICINE

## 2021-11-22 PROCEDURE — G8420 CALC BMI NORM PARAMETERS: HCPCS | Performed by: INTERNAL MEDICINE

## 2021-11-22 PROCEDURE — G8484 FLU IMMUNIZE NO ADMIN: HCPCS | Performed by: INTERNAL MEDICINE

## 2021-11-22 PROCEDURE — 1036F TOBACCO NON-USER: CPT | Performed by: INTERNAL MEDICINE

## 2021-11-22 PROCEDURE — 93000 ELECTROCARDIOGRAM COMPLETE: CPT | Performed by: INTERNAL MEDICINE

## 2021-11-22 PROCEDURE — 99204 OFFICE O/P NEW MOD 45 MIN: CPT | Performed by: INTERNAL MEDICINE

## 2021-11-22 PROCEDURE — 90832 PSYTX W PT 30 MINUTES: CPT | Performed by: PSYCHOLOGIST

## 2021-11-22 PROCEDURE — 1036F TOBACCO NON-USER: CPT | Performed by: PSYCHOLOGIST

## 2021-11-22 RX ORDER — CLONAZEPAM 0.5 MG/1
0.5 TABLET ORAL 2 TIMES DAILY PRN
COMMUNITY
End: 2022-05-26 | Stop reason: ALTCHOICE

## 2021-11-22 NOTE — PATIENT INSTRUCTIONS
Patient Education        Palpitations: Care Instructions  Your Care Instructions     Heart palpitations are the uncomfortable sensation that your heart is beating fast or irregularly. You might feel pounding or fluttering in your chest. It might feel like your heart is skipping a beat. Although palpitations may be caused by a heart problem, they also occur because of stress, fatigue, or use of alcohol, caffeine, or nicotine. Many medicines, including diet pills, antihistamines, decongestants, and some herbal products, can cause heart palpitations. Nearly everyone has palpitations from time to time. Depending on your symptoms, your doctor may need to do more tests to try to find the cause of your palpitations. Follow-up care is a key part of your treatment and safety. Be sure to make and go to all appointments, and call your doctor if you are having problems. It's also a good idea to know your test results and keep a list of the medicines you take. How can you care for yourself at home? · Avoid caffeine, nicotine, and excess alcohol. · Do not take illegal drugs, such as methamphetamines and cocaine. · Do not take weight loss or diet medicines unless you talk with your doctor first.  · Get plenty of sleep. · Do not overeat. · If you have palpitations again, take deep breaths and try to relax. This may slow a racing heart. · If you start to feel lightheaded, lie down to avoid injuries that might result if you pass out and fall down. · Keep a record of your palpitations and bring it to your next doctor's appointment. Write down:  ? The date and time. ? Your pulse. (If your heart is beating fast, it may be hard to count your pulse.)  ? What you were doing when the palpitations started. ? How long the palpitations lasted. ? Any other symptoms. · If an activity causes palpitations, slow down or stop. Talk to your doctor before you do that activity again. · Take your medicines exactly as prescribed.  Call questions about a medical condition or this instruction, always ask your healthcare professional. [place] [address] any warranty or liability for your use of this information.

## 2021-11-22 NOTE — PROGRESS NOTES
Cardiology Consultation     Jones Mallory  1995      Referring Physician: Óscar Kwon NP   Reason for Referral: palpitations, pre-syncope   Chief Complaint:   Chief Complaint   Patient presents with    New Patient     dizziness, lightheaded, some chest pains, sob, fatigue        Subjective:     History of Present Illness: The patient is 23 y.o. female with no significant cardiac history who presents today for evaluation of palpitations and pre-syncope. She reports palpitations for several years. She states she will become lightheaded and her heart rate is \"always fast.\" She reports recurrent lightheadedness but denies any syncopal episodes. She completed an echocardiogram in 2016 that was structurally normal. She states overall she is a healthy active person and denies any worsening shortness of breath or chest pains. Patient denies exertional chest pain/pressure, dyspnea at rest, worsening CHACON, PND, orthopnea, weight changes, changes in LE edema, and syncope. She states she does not some but will vape on a daily basis.      Past Medical History:   Diagnosis Date    Allergic rhinitis     Anxiety     Depression     Headache(784.0)     Miscarriage 12/15     Past Surgical History:   Procedure Laterality Date    TYMPANOSTOMY TUBE PLACEMENT       Family History   Problem Relation Age of Onset    Asthma Father     Asthma Maternal Grandmother     Asthma Maternal Grandfather     Kidney Disease Sister         frequent UTI's     Social History     Tobacco Use    Smoking status: Never Smoker    Smokeless tobacco: Never Used   Vaping Use    Vaping Use: Never used   Substance Use Topics    Alcohol use: Yes     Comment: socially     Drug use: No       Allergies   Allergen Reactions    Adhesive Tape      SKIN IRRITATION    Zithromax [Azithromycin Dihydrate] Hives    Morphine Nausea And Vomiting and Other (See Comments)     H/A  migraine     Current Outpatient Medications Medication Sig Dispense Refill    cariprazine hcl (VRAYLAR) 1.5 MG capsule Take 1.5 mg by mouth daily      clonazePAM (KLONOPIN) 0.5 MG tablet Take 0.5 mg by mouth 2 times daily as needed.  amphetamine-dextroamphetamine (ADDERALL XR) 20 MG extended release capsule Take 1 capsule by mouth every morning for 30 days. 30 capsule 0    omeprazole (PRILOSEC) 20 MG delayed release capsule Take 1 capsule by mouth every morning (before breakfast) 90 capsule 1    buPROPion (WELLBUTRIN XL) 150 MG extended release tablet Take 1 tablet by mouth every morning 30 tablet 3     No current facility-administered medications for this visit. Review of Systems:  · Constitutional: No unanticipated weight loss. There's been no change in energy level, sleep pattern, or activity level. No fevers, chills. · Eyes: No visual changes or diplopia. No scleral icterus. · ENT: No Headaches, hearing loss or vertigo. No mouth sores or sore throat. · Cardiovascular: as reviewed in HPI  · Respiratory: No cough or wheezing, no sputum production. No hemoptysis. · Gastrointestinal: No abdominal pain, appetite loss, blood in stools. No change in bowel or bladder habits. · Genitourinary: No dysuria, trouble voiding, or hematuria. · Musculoskeletal:  No gait disturbance, no joint complaints. · Integumentary: No rash or pruritis. · Neurological: No headache, diplopia, change in muscle strength, numbness or tingling. · Psychiatric: No anxiety or depression. · Endocrine: No temperature intolerance. No excessive thirst, fluid intake, or urination. No tremor. · Hematologic/Lymphatic: No abnormal bruising or bleeding, blood clots or swollen lymph nodes. · Allergic/Immunologic: No nasal congestion or hives.     Physical Exam:   /68   Pulse 91   Ht 5' 1\" (1.549 m)   Wt 117 lb (53.1 kg)   SpO2 99%   BMI 22.11 kg/m²   Wt Readings from Last 3 Encounters:   11/22/21 117 lb (53.1 kg)   09/20/21 123 lb (55.8 kg)   06/04/21 128 lb 4.9 oz (58.2 kg)     Constitutional: She is oriented to person, place, and time. She appears well-developed and well-nourished. In no acute distress. Head: Normocephalic and atraumatic. Pupils equal and round. Neck: Neck supple. No JVP or carotid bruit appreciated. No mass and no thyromegaly present. No lymphadenopathy present. Cardiovascular: Normal rate. Normal heart sounds. Exam reveals no gallop and no friction rub. No murmur heard. Pulmonary/Chest: Effort normal and breath sounds normal. No respiratory distress. She has no wheezes, rhonchi or rales. Abdominal: Soft, non-tender. Bowel sounds are normal. She exhibits no organomegaly, mass or bruit. Extremities: No edema. No cyanosis or clubbing. Pulses are 2+ radial/carotid bilaterally. Neurological: No gross cranial nerve deficit. Coordination normal.   Skin: Skin is warm and dry. There is no rash or diaphoresis. Psychiatric: She has a normal mood and affect. Her speech is normal and behavior is normal.     Lab Review:   FLP:    Lab Results   Component Value Date    TRIG 92 08/28/2018    HDL 38 08/28/2018    LDLCALC 73 08/28/2018    LABVLDL 18 08/28/2018     BUN/Creatinine:    Lab Results   Component Value Date    BUN 5 11/01/2021    CREATININE 0.6 11/01/2021     PT/INR, TNI, HGB A1C:   Lab Results   Component Value Date/Time    TROPONINI <0.01 01/09/2016 12:27 AM    LABA1C 5.2 07/10/2020 04:13 PM      No results found for: CBCAUTODIF    EKG Interpretation: 11/22/21 reviewed. Sinus rhythm. Echo: 5/10/16  Normal left ventricle size, wall thickness, and systolic function with an estimated ejection fraction of 55-60%. No regional wall motion abnormalities are seen. Normal diastolic function. Normal right ventricular size and function. Trivial mitral, tricuspid, and pulmonic regurgitation.     All above diagnostic testing and laboratory data was independently visualized and reviewed by me (not simply review of report)       Assessment and Plan   1)

## 2021-11-23 NOTE — PROGRESS NOTES
Behavioral Health Consultation  Jun Rajan, Ph.D.  Psychologist  11/22/2021  4:30 PM EDT      Time spent with Patient: 30 minutes  This is patient's tenth [facility] appointment. Reason for Consult: depression, stress  Referring Provider: AMMY Anderson CNP [address]  [address] [employer] [id number]    Feedback for PCP:     Pt provided informed consent for the behavioral health program. Discussed with patient model of service to include the limits of confidentiality (i.e. abuse reporting, suicide intervention, etc.) and short-term intervention focused approach. Pt indicated understanding. Feedback given to PCP. S:  Patient trying to regulate her emotion with limited success due to several factors with some situational and others stemming from her family of origin. She says that her mother continues to be a practicing alcoholic and the chaos that the patient grew up with contributes to her very low frustration tolerance. She says that she knows that her relationship with her fiance is untenable and unhealthy but when her home life is relatively conflict free, she \"seems to forget\" how he treats her and the disrespect that characterizes the relationship. She says that she doesn't love him anymore but her low self-esteem and low feelings of self-reliance make it hard for her to leave. She is also reluctant to have to live with her mother and revisit her chaotic childhood. She reports that her social anxiety makes most areas of her functioning very difficult.          Social History     Tobacco Use    Smoking status: Never Smoker    Smokeless tobacco: Never Used   Substance Use Topics    Alcohol use: Yes     Comment: socially         Illicit drugs:   Social History     Substance and Sexual Activity   Drug Use No        O:  MSE:  Appearance: good hygiene   Attitude: cooperative and in distress  Consciousness: alert  Orientation: oriented to person, place, time, general circumstance  Memory: recent and remote memory intact  Attention/Concentration: intact during session  Psychomotor Activity: normal  Eye Contact: normal  Speech: normal rate and volume, well-articulated  Mood: anxious  Affect: congruent  Perception: within normal limits  Thought Content: within normal limits  Thought Process: logical, coherent  Insight: good  Judgment: intact  Ability to understand instructions: Yes  Ability to respond meaningfully: Yes  Morbid Ideation: passive thoughts of death  Suicide Assessment: suicidal ideation without plan or intent  Homicidal Ideation: no    A:  The patient has experienced years of mistreatment and emotional abuse that has sabotaged her sense of self and contributes to chronic emotional dysregulation. Her sense of feeling as though people are angry with her is almost automatic at this point. She is starting to move towards relinquishing her external locus of control to develop an internal locus of control. She continues to struggle with losing weight due to being disinterested in eating, saying she may feel hungry but \"then it passes. \"     ROSE 7 SCORE 11/8/2021 11/1/2021 10/25/2021 10/4/2021 9/20/2021 9/13/2021 8/15/2019   ROSE-7 Total Score 15 16 17 15 19 18 17     Interpretation of ROSE-7 score: 5-9 = mild anxiety, 10-14 = moderate anxiety, 15+ = severe anxiety. Recommend referral to behavioral health for scores 10 or greater. PHQ Scores 11/8/2021 11/1/2021 10/25/2021 10/4/2021 9/20/2021 9/13/2021 4/30/2020   PHQ2 Score 3 3 6 5 5 5 2   PHQ9 Score 19 18 23 22 26 25 2     Interpretation of Total Score Depression Severity: 1-4 = Minimal depression, 5-9 = Mild depression, 10-14 = Moderate depression, 15-19 = Moderately severe depression, 20-27 = Severe depression    Diagnosis:    1. Major depressive disorder, recurrent episode, moderate ([address]    2. PTSD (post-traumatic stress disorder)    3.  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        Pt Behavioral Change Plan:  Pt set the following goals:  Pt scheduled F/U visit in one week  See section 'A'

## 2021-11-29 ENCOUNTER — OFFICE VISIT (OUTPATIENT)
Dept: PSYCHOLOGY | Age: 26
End: 2021-11-29
Payer: MEDICARE

## 2021-11-29 DIAGNOSIS — F90.2 ATTENTION DEFICIT HYPERACTIVITY DISORDER, COMBINED TYPE, MODERATE: ICD-10-CM

## 2021-11-29 DIAGNOSIS — F43.10 PTSD (POST-TRAUMATIC STRESS DISORDER): ICD-10-CM

## 2021-11-29 DIAGNOSIS — F33.1 MAJOR DEPRESSIVE DISORDER, RECURRENT EPISODE, MODERATE (HCC): Primary | ICD-10-CM

## 2021-11-29 PROCEDURE — 1036F TOBACCO NON-USER: CPT | Performed by: PSYCHOLOGIST

## 2021-11-29 PROCEDURE — 90832 PSYTX W PT 30 MINUTES: CPT | Performed by: PSYCHOLOGIST

## 2021-11-29 NOTE — PROGRESS NOTES
Behavioral Health Consultation  Purnima Sanchez, Ph.D.  Psychologist  11/29/2021  10:30 AM EDT      Time spent with Patient: 30 minutes  This is patient's eleventh [facility] appointment. Reason for Consult: depression, stress  Referring Provider: Mar Penta, APRN - CNP  [address]  [id number] [id number]    Feedback for PCP:     Pt provided informed consent for the behavioral health program. Discussed with patient model of service to include the limits of confidentiality (i.e. abuse reporting, suicide intervention, etc.) and short-term intervention focused approach. Pt indicated understanding. Feedback given to PCP. S:  Patient explained her thought patterns in certain situations and that she has a 'gut' feeling about things that she says turns out to be true almost all the time. She described an interaction with her fiance that sounded narcicisstic and cruel, that successfully sabotaged her mood, when she came home with a pair of shoes and asked how they looked on her. She also talked about her communication with another man she is attracted to, that disappointed her, and how her internal dialogue about it reflected a reality based perspective, that she will come to trust more and more.          Social History     Tobacco Use    Smoking status: Never Smoker    Smokeless tobacco: Never Used   Substance Use Topics    Alcohol use: Yes     Comment: socially         Illicit drugs:   Social History     Substance and Sexual Activity   Drug Use No        O:  MSE:  Appearance: good hygiene   Attitude: cooperative and in distress  Consciousness: alert  Orientation: oriented to person, place, time, general circumstance  Memory: recent and remote memory intact  Attention/Concentration: intact during session  Psychomotor Activity: normal  Eye Contact: normal  Speech: normal rate and volume, well-articulated  Mood: anxious  Affect: congruent  Perception: within normal limits  Thought Content: within normal limits  Thought Process: logical, coherent  Insight: good  Judgment: intact  Ability to understand instructions: Yes  Ability to respond meaningfully: Yes  Morbid Ideation: passive thoughts of death  Suicide Assessment: suicidal ideation without plan or intent  Homicidal Ideation: no    A:  The pattern that develops following trauma reflected by Melisa Cartwright, Ph.D. , MD of the triangle of brain processes was explained to her to help her understand how emotion dysregulation can occur seemingly without conscious thought. We talked about her process of changing from reacting to responding and how to acknowledge small changes as wins. ROSE 7 SCORE 11/8/2021 11/1/2021 10/25/2021 10/4/2021 9/20/2021 9/13/2021 8/15/2019   ROSE-7 Total Score 15 16 17 15 19 18 17     Interpretation of ROSE-7 score: 5-9 = mild anxiety, 10-14 = moderate anxiety, 15+ = severe anxiety. Recommend referral to behavioral health for scores 10 or greater. PHQ Scores 11/8/2021 11/1/2021 10/25/2021 10/4/2021 9/20/2021 9/13/2021 4/30/2020   PHQ2 Score 3 3 6 5 5 5 2   PHQ9 Score 19 18 23 22 26 25 2     Interpretation of Total Score Depression Severity: 1-4 = Minimal depression, 5-9 = Mild depression, 10-14 = Moderate depression, 15-19 = Moderately severe depression, 20-27 = Severe depression    Diagnosis:    1. Major depressive disorder, recurrent episode, moderate ([address]    2. Attention deficit hyperactivity disorder, combined type, moderate    3.  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        Pt Behavioral Change Plan:  Pt set the following goals:  Pt scheduled F/U visit in one week  See section 'A'

## 2021-12-06 ENCOUNTER — OFFICE VISIT (OUTPATIENT)
Dept: PSYCHOLOGY | Age: 26
End: 2021-12-06
Payer: MEDICARE

## 2021-12-06 DIAGNOSIS — F43.10 PTSD (POST-TRAUMATIC STRESS DISORDER): Primary | ICD-10-CM

## 2021-12-06 DIAGNOSIS — N92.6 MISSED PERIOD: Primary | ICD-10-CM

## 2021-12-06 DIAGNOSIS — F33.1 MAJOR DEPRESSIVE DISORDER, RECURRENT EPISODE, MODERATE (HCC): ICD-10-CM

## 2021-12-06 PROCEDURE — 90832 PSYTX W PT 30 MINUTES: CPT | Performed by: PSYCHOLOGIST

## 2021-12-06 PROCEDURE — 1036F TOBACCO NON-USER: CPT | Performed by: PSYCHOLOGIST

## 2021-12-06 NOTE — PROGRESS NOTES
Behavioral Health Consultation  Howard Blair, Ph.D.  Psychologist  12/6/2021  10:30 AM EDT      Time spent with Patient: 30 minutes  This is patient's twelfth [facility] appointment. Reason for Consult: depression, stress  Referring Provider: Jamison Heater, APRN - CNP  [address]  [address] [place] [address]    Feedback for PCP:     Pt provided informed consent for the behavioral health program. Discussed with patient model of service to include the limits of confidentiality (i.e. abuse reporting, suicide intervention, etc.) and short-term intervention focused approach. Pt indicated understanding. Feedback given to PCP. S:  Patient reported that she is pregnant and will follow up with Dr Hugh Barroso as to what that means for her psychotropics. She said that she is going to follow-up with a GI specialist to determine the etiology of her weight loss. She said, \"I Googled my symptoms and it looks like I have colon cancer,\" somewhat sarcastically. She said she will feel relief to know why she's been losing weight. She talked more about trusting her 'gut' and said that she has premonitions about events that seem to come true. She said that she and her sister have the same thing going on. She said that she is passionate about learning about mental health issues and can see herself returning to college to finish her degree in psychology.          Social History     Tobacco Use    Smoking status: Never Smoker    Smokeless tobacco: Never Used   Substance Use Topics    Alcohol use: Yes     Comment: socially         Illicit drugs:   Social History     Substance and Sexual Activity   Drug Use No        O:  MSE:  Appearance: good hygiene   Attitude: cooperative and in distress  Consciousness: alert  Orientation: oriented to person, place, time, general circumstance  Memory: recent and remote memory intact  Attention/Concentration: intact during session  Psychomotor Activity: normal  Eye Contact: normal  Speech: normal rate and volume, well-articulated  Mood: anxious  Affect: congruent  Perception: within normal limits  Thought Content: within normal limits  Thought Process: logical, coherent  Insight: good  Judgment: intact  Ability to understand instructions: Yes  Ability to respond meaningfully: Yes  Morbid Ideation: passive thoughts of death  Suicide Assessment: suicidal ideation without plan or intent  Homicidal Ideation: no    A:  The patient said that she can get PRN relief from overwhelming feelings from [place], which is helpful for her. We talked more about external vs internal locus of control, and how her belief and passion about things that she is intrinsically interested in helps her build feelings of self-reliance and increasing perceived competence. She said that the idea that she is \"changing the dance\" with her fiance has been helpful for her. ROSE 7 SCORE 11/8/2021 11/1/2021 10/25/2021 10/4/2021 9/20/2021 9/13/2021 8/15/2019   ROSE-7 Total Score 15 16 17 15 19 18 17     Interpretation of ROSE-7 score: 5-9 = mild anxiety, 10-14 = moderate anxiety, 15+ = severe anxiety. Recommend referral to behavioral health for scores 10 or greater. PHQ Scores 11/8/2021 11/1/2021 10/25/2021 10/4/2021 9/20/2021 9/13/2021 4/30/2020   PHQ2 Score 3 3 6 5 5 5 2   PHQ9 Score 19 18 23 22 26 25 2     Interpretation of Total Score Depression Severity: 1-4 = Minimal depression, 5-9 = Mild depression, 10-14 = Moderate depression, 15-19 = Moderately severe depression, 20-27 = Severe depression    Diagnosis:    1. PTSD (post-traumatic stress disorder)    2.  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        Pt Behavioral Change Plan:  Pt set the following goals:  Pt scheduled F/U visit in one week  See section 'A'

## 2021-12-07 ENCOUNTER — TELEPHONE (OUTPATIENT)
Dept: PRIMARY CARE CLINIC | Age: 26
End: 2021-12-07

## 2021-12-07 DIAGNOSIS — Z3A.01 LESS THAN 8 WEEKS GESTATION OF PREGNANCY: Primary | ICD-10-CM

## 2021-12-07 NOTE — TELEPHONE ENCOUNTER
----- Message from Kailey No sent at 2021 12:27 PM EST -----  Subject: Message to Provider    QUESTIONS  Information for Provider? Patient calling to request a prescription for   pre- vitamins to be called in to [facility] on [address] Please   contact patient with any further questions.  ---------------------------------------------------------------------------  --------------  CALL BACK INFO  What is the best way for the office to contact you? OK to leave message on   voicemail  Preferred Call Back Phone Number? [phone]  ---------------------------------------------------------------------------  --------------  SCRIPT ANSWERS  Relationship to Patient?  Self

## 2021-12-08 RX ORDER — CHOLECALCIFEROL (VITAMIN D3) 25 MCG
1 TABLET,CHEWABLE ORAL DAILY
Qty: 90 CAPSULE | Refills: 2 | Status: SHIPPED | OUTPATIENT
Start: 2021-12-08

## 2021-12-13 ENCOUNTER — OFFICE VISIT (OUTPATIENT)
Dept: PSYCHOLOGY | Age: 26
End: 2021-12-13
Payer: MEDICARE

## 2021-12-13 DIAGNOSIS — F90.2 ATTENTION DEFICIT HYPERACTIVITY DISORDER, COMBINED TYPE, MODERATE: ICD-10-CM

## 2021-12-13 DIAGNOSIS — F33.2 SEVERE EPISODE OF RECURRENT MAJOR DEPRESSIVE DISORDER, WITHOUT PSYCHOTIC FEATURES (HCC): Primary | ICD-10-CM

## 2021-12-13 PROCEDURE — 90832 PSYTX W PT 30 MINUTES: CPT | Performed by: PSYCHOLOGIST

## 2021-12-13 PROCEDURE — 1036F TOBACCO NON-USER: CPT | Performed by: PSYCHOLOGIST

## 2021-12-13 ASSESSMENT — ANXIETY QUESTIONNAIRES
6. BECOMING EASILY ANNOYED OR IRRITABLE: 3-NEARLY EVERY DAY
7. FEELING AFRAID AS IF SOMETHING AWFUL MIGHT HAPPEN: 3-NEARLY EVERY DAY
3. WORRYING TOO MUCH ABOUT DIFFERENT THINGS: 2-OVER HALF THE DAYS
5. BEING SO RESTLESS THAT IT IS HARD TO SIT STILL: 2-OVER HALF THE DAYS
1. FEELING NERVOUS, ANXIOUS, OR ON EDGE: 2-OVER HALF THE DAYS
GAD7 TOTAL SCORE: 16
4. TROUBLE RELAXING: 2-OVER HALF THE DAYS
2. NOT BEING ABLE TO STOP OR CONTROL WORRYING: 2-OVER HALF THE DAYS

## 2021-12-13 ASSESSMENT — PATIENT HEALTH QUESTIONNAIRE - PHQ9
7. TROUBLE CONCENTRATING ON THINGS, SUCH AS READING THE NEWSPAPER OR WATCHING TELEVISION: 3
2. FEELING DOWN, DEPRESSED OR HOPELESS: 2
5. POOR APPETITE OR OVEREATING: 3
3. TROUBLE FALLING OR STAYING ASLEEP: 1
4. FEELING TIRED OR HAVING LITTLE ENERGY: 3
1. LITTLE INTEREST OR PLEASURE IN DOING THINGS: 2
6. FEELING BAD ABOUT YOURSELF - OR THAT YOU ARE A FAILURE OR HAVE LET YOURSELF OR YOUR FAMILY DOWN: 2
SUM OF ALL RESPONSES TO PHQ QUESTIONS 1-9: 18
SUM OF ALL RESPONSES TO PHQ QUESTIONS 1-9: 20
SUM OF ALL RESPONSES TO PHQ9 QUESTIONS 1 & 2: 4
SUM OF ALL RESPONSES TO PHQ QUESTIONS 1-9: 20
9. THOUGHTS THAT YOU WOULD BE BETTER OFF DEAD, OR OF HURTING YOURSELF: 2
8. MOVING OR SPEAKING SO SLOWLY THAT OTHER PEOPLE COULD HAVE NOTICED. OR THE OPPOSITE, BEING SO FIGETY OR RESTLESS THAT YOU HAVE BEEN MOVING AROUND A LOT MORE THAN USUAL: 2

## 2021-12-13 NOTE — PROGRESS NOTES
Behavioral Health Consultation  Leela Vázquez, Ph.D.  Psychologist  12/13/2021  10:30 AM EDT      Time spent with Patient: 30 minutes  This is patient's thirteenth [facility] appointment. Reason for Consult: depression, stress  Referring Provider: AMMY Deluna - CNP  [address]  [address] [employer] [address]    Feedback for PCP:     Pt provided informed consent for the behavioral health program. Discussed with patient model of service to include the limits of confidentiality (i.e. abuse reporting, suicide intervention, etc.) and short-term intervention focused approach. Pt indicated understanding. Feedback given to PCP. S:  Patient reports that her visit to the GI doc resulted in some r/o dx, that include Crohn's disease, parasites, or IBS. Her doc wanted to do a colonoscopy but because she is 5 weeks pregnant, they will delay this. She said that she has gained 2 pounds, which she is happy about. We talked about her sources of stress in trying to balance working more (5 days the next weeks) and getting her kids to . She said that she weighs herself everyday and worries about fluctuations.          Social History     Tobacco Use    Smoking status: Never Smoker    Smokeless tobacco: Never Used   Substance Use Topics    Alcohol use: Yes     Comment: socially         Illicit drugs:   Social History     Substance and Sexual Activity   Drug Use No        O:  MSE:  Appearance: good hygiene   Attitude: cooperative and in distress  Consciousness: alert  Orientation: oriented to person, place, time, general circumstance  Memory: recent and remote memory intact  Attention/Concentration: intact during session  Psychomotor Activity: normal  Eye Contact: normal  Speech: normal rate and volume, well-articulated  Mood: anxious  Affect: congruent  Perception: within normal limits  Thought Content: within normal limits  Thought Process: logical, coherent  Insight: good  Judgment: intact  Ability to understand instructions: Yes  Ability to respond meaningfully: Yes  Morbid Ideation: passive thoughts of death  Suicide Assessment: suicidal ideation without plan or intent  Homicidal Ideation: no    A:  We talked about sharing a plan with her fiance to manage her stress about scheduling as well as weighing herself only once a week instead of daily. We talked about stress management that would benefit her GI problems as well as her pregnancy. She is collecting evidence that many of the self-criticisms are actually from ADHD and stress, which is helping her self-esteem. ROSE 7 SCORE 12/13/2021 11/8/2021 11/1/2021 10/25/2021 10/4/2021 9/20/2021 9/13/2021   ROSE-7 Total Score 16 15 16 17 15 19 18     Interpretation of ROSE-7 score: 5-9 = mild anxiety, 10-14 = moderate anxiety, 15+ = severe anxiety. Recommend referral to behavioral health for scores 10 or greater. PHQ Scores 12/13/2021 11/8/2021 11/1/2021 10/25/2021 10/4/2021 9/20/2021 9/13/2021   PHQ2 Score 4 3 3 6 5 5 5   PHQ9 Score 20 19 18 23 22 26 25     Interpretation of Total Score Depression Severity: 1-4 = Minimal depression, 5-9 = Mild depression, 10-14 = Moderate depression, 15-19 = Moderately severe depression, 20-27 = Severe depression    Diagnosis:    1. Severe episode of recurrent major depressive disorder, without psychotic features [address] [address]    2. 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        Pt Behavioral Change Plan:  Pt set the following goals:  Pt scheduled F/U visit in one week  See section 'A'

## 2021-12-15 ENCOUNTER — OFFICE VISIT (OUTPATIENT)
Dept: PSYCHIATRY | Age: 26
End: 2021-12-15
Payer: MEDICARE

## 2021-12-15 VITALS
DIASTOLIC BLOOD PRESSURE: 74 MMHG | SYSTOLIC BLOOD PRESSURE: 120 MMHG | WEIGHT: 114 LBS | BODY MASS INDEX: 21.54 KG/M2 | OXYGEN SATURATION: 100 % | HEART RATE: 105 BPM

## 2021-12-15 DIAGNOSIS — F90.2 ATTENTION DEFICIT HYPERACTIVITY DISORDER (ADHD), COMBINED TYPE: ICD-10-CM

## 2021-12-15 DIAGNOSIS — F41.9 ANXIETY: Primary | ICD-10-CM

## 2021-12-15 PROCEDURE — 99214 OFFICE O/P EST MOD 30 MIN: CPT | Performed by: PSYCHIATRY & NEUROLOGY

## 2021-12-15 PROCEDURE — G8484 FLU IMMUNIZE NO ADMIN: HCPCS | Performed by: PSYCHIATRY & NEUROLOGY

## 2021-12-15 PROCEDURE — G8428 CUR MEDS NOT DOCUMENT: HCPCS | Performed by: PSYCHIATRY & NEUROLOGY

## 2021-12-15 PROCEDURE — G8420 CALC BMI NORM PARAMETERS: HCPCS | Performed by: PSYCHIATRY & NEUROLOGY

## 2021-12-15 PROCEDURE — 1036F TOBACCO NON-USER: CPT | Performed by: PSYCHIATRY & NEUROLOGY

## 2021-12-15 NOTE — PROGRESS NOTES
Psych Follow Up Progress Note    12/15/21  Rubia Haile  [id number]    I have reviewed recent documentation:  Rubia Haile is a 23 y.o. female  This patient  has a past medical history of Allergic rhinitis, Anxiety, Depression, Headache(784.0), and Miscarriage. Chief Complaint   Patient presents with    Anxiety     Subjective/Interval Hx:  Pregnant! Which BF Raz Turner is excited out. This wasn't exactly expected, but pt is framing in her mind as blessing. Dione Clayton' divorce is pending. But it's a lot of back and forth. Dad got 7 more years. Location:  Mind  Severity:  mildish. Context:  As above. Modifiers:  meds somewhat helpful  Quality:  Anxiety, depression    Objective:  Vitals:    12/15/21 1635   BP: 120/74   Pulse: 105   SpO2: 100%   Weight: 114 lb (51.7 kg)     Body mass index is 21.54 kg/m². No results found for this or any previous visit (from the past 168 hour(s)). Current Outpatient Medications   Medication Sig Dispense Refill    Prenatal Vit-Fe Sulfate-FA-DHA (PRENATAL VITAMIN/MIN +DHA) 27-0.8-200 MG CAPS Take 1 tablet by mouth daily 90 capsule 2    amphetamine-dextroamphetamine (ADDERALL XR) 20 MG extended release capsule Take 1 capsule by mouth every morning for 30 days. 30 capsule 0    cariprazine hcl (VRAYLAR) 1.5 MG capsule Take 1.5 mg by mouth daily      clonazePAM (KLONOPIN) 0.5 MG tablet Take 0.5 mg by mouth 2 times daily as needed.  omeprazole (PRILOSEC) 20 MG delayed release capsule Take 1 capsule by mouth every morning (before breakfast) 90 capsule 1    buPROPion (WELLBUTRIN XL) 150 MG extended release tablet Take 1 tablet by mouth every morning 30 tablet 3     No current facility-administered medications for this visit. ROS:  No tremor, gait nl    MSE:    A-casually dressed, good EC, pleasant and engageable  A-full  M-a little better.   S-grossly A + O  I/J-fair/fair  T-linear, goal-directed.  Speech c nl r/t/v/a.  No S/H I, no a/v h.       A/P  26 y.o. F c     1.  ADHD-Got off all meds when she found out she was pregnant. We'll consider tenex.       2.  Anxiety, some ocd-we'll hold off on any changes as pt progresses through pregnancy. I have spent >25 mins with this patient on working on coping skills and med mgt, and > 1/2 of that time was spent counseling this pt.

## 2021-12-20 ENCOUNTER — OFFICE VISIT (OUTPATIENT)
Dept: PSYCHOLOGY | Age: 26
End: 2021-12-20
Payer: MEDICARE

## 2021-12-20 DIAGNOSIS — F90.2 ATTENTION DEFICIT HYPERACTIVITY DISORDER, COMBINED TYPE, MODERATE: ICD-10-CM

## 2021-12-20 DIAGNOSIS — F33.1 MAJOR DEPRESSIVE DISORDER, RECURRENT EPISODE, MODERATE (HCC): Primary | ICD-10-CM

## 2021-12-20 PROCEDURE — 90832 PSYTX W PT 30 MINUTES: CPT | Performed by: PSYCHOLOGIST

## 2021-12-20 PROCEDURE — 1036F TOBACCO NON-USER: CPT | Performed by: PSYCHOLOGIST

## 2021-12-20 ASSESSMENT — PATIENT HEALTH QUESTIONNAIRE - PHQ9
8. MOVING OR SPEAKING SO SLOWLY THAT OTHER PEOPLE COULD HAVE NOTICED. OR THE OPPOSITE, BEING SO FIGETY OR RESTLESS THAT YOU HAVE BEEN MOVING AROUND A LOT MORE THAN USUAL: 2
9. THOUGHTS THAT YOU WOULD BE BETTER OFF DEAD, OR OF HURTING YOURSELF: 2
3. TROUBLE FALLING OR STAYING ASLEEP: 3
SUM OF ALL RESPONSES TO PHQ9 QUESTIONS 1 & 2: 3
2. FEELING DOWN, DEPRESSED OR HOPELESS: 1
SUM OF ALL RESPONSES TO PHQ QUESTIONS 1-9: 17
4. FEELING TIRED OR HAVING LITTLE ENERGY: 2
6. FEELING BAD ABOUT YOURSELF - OR THAT YOU ARE A FAILURE OR HAVE LET YOURSELF OR YOUR FAMILY DOWN: 2
5. POOR APPETITE OR OVEREATING: 2
7. TROUBLE CONCENTRATING ON THINGS, SUCH AS READING THE NEWSPAPER OR WATCHING TELEVISION: 3
SUM OF ALL RESPONSES TO PHQ QUESTIONS 1-9: 19
SUM OF ALL RESPONSES TO PHQ QUESTIONS 1-9: 19
1. LITTLE INTEREST OR PLEASURE IN DOING THINGS: 2

## 2021-12-20 ASSESSMENT — ANXIETY QUESTIONNAIRES
5. BEING SO RESTLESS THAT IT IS HARD TO SIT STILL: 2-OVER HALF THE DAYS
1. FEELING NERVOUS, ANXIOUS, OR ON EDGE: 3-NEARLY EVERY DAY
6. BECOMING EASILY ANNOYED OR IRRITABLE: 3-NEARLY EVERY DAY
2. NOT BEING ABLE TO STOP OR CONTROL WORRYING: 2-OVER HALF THE DAYS
3. WORRYING TOO MUCH ABOUT DIFFERENT THINGS: 2-OVER HALF THE DAYS
7. FEELING AFRAID AS IF SOMETHING AWFUL MIGHT HAPPEN: 3-NEARLY EVERY DAY
4. TROUBLE RELAXING: 2-OVER HALF THE DAYS
GAD7 TOTAL SCORE: 17
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
## 2021-12-27 ENCOUNTER — OFFICE VISIT (OUTPATIENT)
Dept: PSYCHOLOGY | Age: 26
End: 2021-12-27
Payer: MEDICARE

## 2021-12-27 DIAGNOSIS — F33.2 SEVERE EPISODE OF RECURRENT MAJOR DEPRESSIVE DISORDER, WITHOUT PSYCHOTIC FEATURES (HCC): ICD-10-CM

## 2021-12-27 DIAGNOSIS — R45.851 SUICIDAL IDEATION: Primary | ICD-10-CM

## 2021-12-27 PROCEDURE — 90837 PSYTX W PT 60 MINUTES: CPT | Performed by: PSYCHOLOGIST

## 2021-12-27 PROCEDURE — 1036F TOBACCO NON-USER: CPT | Performed by: PSYCHOLOGIST
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
## 2021-12-30 RX ORDER — CHOLECALCIFEROL (VITAMIN D3) 50 MCG
TABLET ORAL
COMMUNITY
Start: 2021-12-08

## 2022-01-17 ENCOUNTER — OFFICE VISIT (OUTPATIENT)
Dept: PSYCHOLOGY | Age: 27
End: 2022-01-17
Payer: MEDICARE

## 2022-01-17 DIAGNOSIS — F34.1 PERSISTENT DEPRESSIVE DISORDER WITH ANXIOUS DISTRESS, CURRENTLY SEVERE: ICD-10-CM

## 2022-01-17 DIAGNOSIS — F33.1 MAJOR DEPRESSIVE DISORDER, RECURRENT EPISODE, MODERATE (HCC): Primary | ICD-10-CM

## 2022-01-17 DIAGNOSIS — F90.2 ATTENTION DEFICIT HYPERACTIVITY DISORDER, COMBINED TYPE, MODERATE: ICD-10-CM

## 2022-01-17 PROCEDURE — 90832 PSYTX W PT 30 MINUTES: CPT | Performed by: PSYCHOLOGIST

## 2022-01-17 NOTE — PROGRESS NOTES
normal  Eye Contact: normal  Speech: normal rate and volume, well-articulated  Mood: anxious  Affect: congruent  Perception: within normal limits  Thought Content: within normal limits  Thought Process: logical, coherent  Insight: good  Judgment: intact  Ability to understand instructions: Yes  Ability to respond meaningfully: Yes  Morbid Ideation: passive thoughts of death  Suicide Assessment: suicidal ideation without plan or intent  Homicidal Ideation: no    A:  We problem-solved around the issues she brought up, with finding some aromatherapy oils to put under her nose for a while until she's extinguished the other smell and whatever association there was. With regard to feeling overwhelmed with her tasks at home, to choose doing one thing in the living room one thing in the kitchen to overcome her avoidance. She also mentioned that there is a red couch that agitates her one of other rooms so she is going to cover it up with a blanket so she doesn't have to look at it. For her self-criticism, she will remind herself that she is creating a life in her body, and how that will seem to make self-criticism less powerful. ROSE 7 SCORE 12/20/2021 12/13/2021 11/8/2021 11/1/2021 10/25/2021 10/4/2021 9/20/2021   ROSE-7 Total Score 17 16 15 16 17 15 19     Interpretation of ROSE-7 score: 5-9 = mild anxiety, 10-14 = moderate anxiety, 15+ = severe anxiety. Recommend referral to behavioral health for scores 10 or greater. PHQ Scores 12/20/2021 12/13/2021 11/8/2021 11/1/2021 10/25/2021 10/4/2021 9/20/2021   PHQ2 Score 3 4 3 3 6 5 5   PHQ9 Score 19 20 19 18 23 22 26     Interpretation of Total Score Depression Severity: 1-4 = Minimal depression, 5-9 = Mild depression, 10-14 = Moderate depression, 15-19 = Moderately severe depression, 20-27 = Severe depression    Diagnosis:    1. Major depressive disorder, recurrent episode, moderate (HCC)    2. Persistent depressive disorder with anxious distress, currently severe    3. 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        Pt Behavioral Change Plan:  Pt set the following goals:  Pt scheduled F/U visit in one week  See section 'A'

## 2022-01-20 LAB — ANTIBODY: NORMAL

## 2022-01-24 ENCOUNTER — OFFICE VISIT (OUTPATIENT)
Dept: PSYCHOLOGY | Age: 27
End: 2022-01-24
Payer: MEDICARE

## 2022-01-24 DIAGNOSIS — F33.1 MAJOR DEPRESSIVE DISORDER, RECURRENT EPISODE, MODERATE (HCC): Primary | ICD-10-CM

## 2022-01-24 PROCEDURE — 90837 PSYTX W PT 60 MINUTES: CPT | Performed by: PSYCHOLOGIST

## 2022-01-24 ASSESSMENT — PATIENT HEALTH QUESTIONNAIRE - PHQ9
SUM OF ALL RESPONSES TO PHQ QUESTIONS 1-9: 17
9. THOUGHTS THAT YOU WOULD BE BETTER OFF DEAD, OR OF HURTING YOURSELF: 1
SUM OF ALL RESPONSES TO PHQ9 QUESTIONS 1 & 2: 5
2. FEELING DOWN, DEPRESSED OR HOPELESS: 2
3. TROUBLE FALLING OR STAYING ASLEEP: 2
SUM OF ALL RESPONSES TO PHQ QUESTIONS 1-9: 17
6. FEELING BAD ABOUT YOURSELF - OR THAT YOU ARE A FAILURE OR HAVE LET YOURSELF OR YOUR FAMILY DOWN: 2
1. LITTLE INTEREST OR PLEASURE IN DOING THINGS: 3
7. TROUBLE CONCENTRATING ON THINGS, SUCH AS READING THE NEWSPAPER OR WATCHING TELEVISION: 1
5. POOR APPETITE OR OVEREATING: 2
SUM OF ALL RESPONSES TO PHQ QUESTIONS 1-9: 17
SUM OF ALL RESPONSES TO PHQ QUESTIONS 1-9: 16
8. MOVING OR SPEAKING SO SLOWLY THAT OTHER PEOPLE COULD HAVE NOTICED. OR THE OPPOSITE, BEING SO FIGETY OR RESTLESS THAT YOU HAVE BEEN MOVING AROUND A LOT MORE THAN USUAL: 1
4. FEELING TIRED OR HAVING LITTLE ENERGY: 3

## 2022-01-24 ASSESSMENT — ANXIETY QUESTIONNAIRES
5. BEING SO RESTLESS THAT IT IS HARD TO SIT STILL: 1-SEVERAL DAYS
GAD7 TOTAL SCORE: 12
3. WORRYING TOO MUCH ABOUT DIFFERENT THINGS: 2-OVER HALF THE DAYS
4. TROUBLE RELAXING: 1-SEVERAL DAYS
1. FEELING NERVOUS, ANXIOUS, OR ON EDGE: 1-SEVERAL DAYS
6. BECOMING EASILY ANNOYED OR IRRITABLE: 3-NEARLY EVERY DAY
2. NOT BEING ABLE TO STOP OR CONTROL WORRYING: 1-SEVERAL DAYS
7. FEELING AFRAID AS IF SOMETHING AWFUL MIGHT HAPPEN: 3-NEARLY EVERY DAY

## 2022-01-24 NOTE — PROGRESS NOTES
Behavioral Health Consultation  Adrianne Asencio, Ph.D.  Psychologist  1/24/2022  10:30 AM EDT      Time spent with Patient: 60 minutes  This is patient's sixteenth [facility] appointment. Reason for Consult: depression, stress  Referring Provider: Yaneli Cunas, APRN - CNP  [address]  [address] [address]    Feedback for PCP:     Pt provided informed consent for the behavioral health program. Discussed with patient model of service to include the limits of confidentiality (i.e. abuse reporting, suicide intervention, etc.) and short-term intervention focused approach. Pt indicated understanding. Feedback given to PCP. S:  Patient reports that she is doing better in some areas, including nutrition, self-criticism, and self care. She is working in needless self criticism when what she actually doing is self-care. She complained to herself that she takes showers but doesn't care about doing anything with her hair. We talked about strategies to work on behavior modification with her children. She says that she is having some olfactory issues with certain smells making her feel nauseous. She also said that she feels less social anxiety at work and thinks it's because she feels like she's doing better overall.          Social History     Tobacco Use    Smoking status: Never Smoker    Smokeless tobacco: Never Used   Substance Use Topics    Alcohol use: Yes     Comment: socially         Illicit drugs:   Social History     Substance and Sexual Activity   Drug Use No        O:  MSE:  Appearance: good hygiene   Attitude: cooperative and in distress  Consciousness: alert  Orientation: oriented to person, place, time, general circumstance  Memory: recent and remote memory intact  Attention/Concentration: intact during session  Psychomotor Activity: normal  Eye Contact: normal  Speech: normal rate and volume, well-articulated  Mood: anxious  Affect: congruent  Perception: within normal limits  Thought Content: within normal limits  Thought Process: logical, coherent  Insight: good  Judgment: intact  Ability to understand instructions: Yes  Ability to respond meaningfully: Yes  Morbid Ideation: passive thoughts of death  Suicide Assessment: suicidal ideation without plan or intent  Homicidal Ideation: no    A:  We came up with some ideas to increase her children's compliance with her instructions and increasing positive statements to her kids so they might need negative attention less. We talked about ADLs and how these don't include making herself look pretty, that she is doing fine with the important ones. She continues to work on self criticism. ROSE 7 SCORE 1/24/2022 12/20/2021 12/13/2021 11/8/2021 11/1/2021 10/25/2021 10/4/2021   ROSE-7 Total Score 12 17 16 15 16 17 15     Interpretation of ROSE-7 score: 5-9 = mild anxiety, 10-14 = moderate anxiety, 15+ = severe anxiety. Recommend referral to behavioral health for scores 10 or greater. PHQ Scores 1/24/2022 12/20/2021 12/13/2021 11/8/2021 11/1/2021 10/25/2021 10/4/2021   PHQ2 Score 5 3 4 3 3 6 5   PHQ9 Score 17 19 20 19 18 23 22     Interpretation of Total Score Depression Severity: 1-4 = Minimal depression, 5-9 = Mild depression, 10-14 = Moderate depression, 15-19 = Moderately severe depression, 20-27 = Severe depression    Diagnosis:    1. Major depressive disorder, recurrent episode, moderate (HCC)        Patient Active Problem List   Diagnosis    Acute pyelonephritis without lesion of renal medullary necrosis    Paroxysmal tachycardia (HCC)    Cystitis    GBS bacteriuria    Normal labor    Pregnant    Pregnant and not yet delivered, unspecified trimester         Plan:  Pt interventions:  Supportive techniques, Provided Psychoeducation re: diferential diagnostic process, Emphasized self-care as important for managing overall health and Provided handout on assertiveness.        Pt Behavioral Change Plan:  Pt set the following goals:  Pt scheduled F/U visit in one week  See section 'A'

## 2022-01-27 ENCOUNTER — OFFICE VISIT (OUTPATIENT)
Dept: PSYCHIATRY | Age: 27
End: 2022-01-27
Payer: MEDICARE

## 2022-01-27 DIAGNOSIS — F90.0 ADHD (ATTENTION DEFICIT HYPERACTIVITY DISORDER), INATTENTIVE TYPE: ICD-10-CM

## 2022-01-27 DIAGNOSIS — F41.9 ANXIETY: Primary | ICD-10-CM

## 2022-01-27 DIAGNOSIS — F43.10 PTSD (POST-TRAUMATIC STRESS DISORDER): ICD-10-CM

## 2022-01-27 PROCEDURE — G8420 CALC BMI NORM PARAMETERS: HCPCS | Performed by: NURSE PRACTITIONER

## 2022-01-27 PROCEDURE — 99203 OFFICE O/P NEW LOW 30 MIN: CPT | Performed by: NURSE PRACTITIONER

## 2022-01-27 PROCEDURE — G8428 CUR MEDS NOT DOCUMENT: HCPCS | Performed by: NURSE PRACTITIONER

## 2022-01-27 PROCEDURE — 1036F TOBACCO NON-USER: CPT | Performed by: NURSE PRACTITIONER

## 2022-01-27 PROCEDURE — G8484 FLU IMMUNIZE NO ADMIN: HCPCS | Performed by: NURSE PRACTITIONER

## 2022-01-27 ASSESSMENT — PATIENT HEALTH QUESTIONNAIRE - PHQ9
SUM OF ALL RESPONSES TO PHQ QUESTIONS 1-9: 16
1. LITTLE INTEREST OR PLEASURE IN DOING THINGS: 3
SUM OF ALL RESPONSES TO PHQ9 QUESTIONS 1 & 2: 5
9. THOUGHTS THAT YOU WOULD BE BETTER OFF DEAD, OR OF HURTING YOURSELF: 1
8. MOVING OR SPEAKING SO SLOWLY THAT OTHER PEOPLE COULD HAVE NOTICED. OR THE OPPOSITE, BEING SO FIGETY OR RESTLESS THAT YOU HAVE BEEN MOVING AROUND A LOT MORE THAN USUAL: 2
5. POOR APPETITE OR OVEREATING: 1
3. TROUBLE FALLING OR STAYING ASLEEP: 2
SUM OF ALL RESPONSES TO PHQ QUESTIONS 1-9: 16
SUM OF ALL RESPONSES TO PHQ QUESTIONS 1-9: 16
6. FEELING BAD ABOUT YOURSELF - OR THAT YOU ARE A FAILURE OR HAVE LET YOURSELF OR YOUR FAMILY DOWN: 2
SUM OF ALL RESPONSES TO PHQ QUESTIONS 1-9: 15
10. IF YOU CHECKED OFF ANY PROBLEMS, HOW DIFFICULT HAVE THESE PROBLEMS MADE IT FOR YOU TO DO YOUR WORK, TAKE CARE OF THINGS AT HOME, OR GET ALONG WITH OTHER PEOPLE: 2
4. FEELING TIRED OR HAVING LITTLE ENERGY: 2
2. FEELING DOWN, DEPRESSED OR HOPELESS: 2
7. TROUBLE CONCENTRATING ON THINGS, SUCH AS READING THE NEWSPAPER OR WATCHING TELEVISION: 1

## 2022-01-27 ASSESSMENT — ANXIETY QUESTIONNAIRES
3. WORRYING TOO MUCH ABOUT DIFFERENT THINGS: 2-OVER HALF THE DAYS
5. BEING SO RESTLESS THAT IT IS HARD TO SIT STILL: 1-SEVERAL DAYS
4. TROUBLE RELAXING: 1-SEVERAL DAYS
2. NOT BEING ABLE TO STOP OR CONTROL WORRYING: 2-OVER HALF THE DAYS
6. BECOMING EASILY ANNOYED OR IRRITABLE: 3-NEARLY EVERY DAY
7. FEELING AFRAID AS IF SOMETHING AWFUL MIGHT HAPPEN: 3-NEARLY EVERY DAY
1. FEELING NERVOUS, ANXIOUS, OR ON EDGE: 3-NEARLY EVERY DAY
GAD7 TOTAL SCORE: 15
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
## 2022-02-07 ENCOUNTER — OFFICE VISIT (OUTPATIENT)
Dept: PSYCHOLOGY | Age: 27
End: 2022-02-07
Payer: MEDICARE

## 2022-02-07 DIAGNOSIS — F33.1 MAJOR DEPRESSIVE DISORDER, RECURRENT EPISODE, MODERATE (HCC): Primary | ICD-10-CM

## 2022-02-07 DIAGNOSIS — F90.2 ATTENTION DEFICIT HYPERACTIVITY DISORDER, COMBINED TYPE, MODERATE: ICD-10-CM

## 2022-02-07 PROCEDURE — 1036F TOBACCO NON-USER: CPT | Performed by: PSYCHOLOGIST

## 2022-02-07 PROCEDURE — 90832 PSYTX W PT 30 MINUTES: CPT | Performed by: PSYCHOLOGIST

## 2022-02-07 ASSESSMENT — PATIENT HEALTH QUESTIONNAIRE - PHQ9
SUM OF ALL RESPONSES TO PHQ QUESTIONS 1-9: 2
2. FEELING DOWN, DEPRESSED OR HOPELESS: 1
1. LITTLE INTEREST OR PLEASURE IN DOING THINGS: 1
SUM OF ALL RESPONSES TO PHQ QUESTIONS 1-9: 2
SUM OF ALL RESPONSES TO PHQ9 QUESTIONS 1 & 2: 2

## 2022-02-07 NOTE — PROGRESS NOTES
Behavioral Health Consultation  Watson Black, Ph.D.  Psychologist  2/7/2022  10:30 AM EDT      Time spent with Patient: 30 minutes  This is patient's seventeenth [facility] appointment. Reason for Consult: depression, stress  Referring Provider: AMMY Mitchell - CNP  [address]  [address] [id number]    Feedback for PCP:     Pt provided informed consent for the behavioral health program. Discussed with patient model of service to include the limits of confidentiality (i.e. abuse reporting, suicide intervention, etc.) and short-term intervention focused approach. Pt indicated understanding. Feedback given to PCP. S:  Patient reports that with the increase in Zoloft following her visit with her prescribing doc, she is feeling less anxious and having good days a little more often. She said that she recently had a very good day with lots of energy and that she got a lot of cleaning done around the house, which helps her feel better. She said at this point in her pregnancy, \"I think about food all the time,\" and that she craves foods as diverse as chocolate milk and brocolli and cheese, where she didn't care for either on these foods previously. She said that she still has \"pockets of OCD, \" but that these preferences have not gotten in the way of her ADLs or responsibilities. For example she cannot walk through grass in her bare feet, nor will she allow he kids to do so either. She also counts some, but again, these do not seem to reach a clinical level of concern.          Social History     Tobacco Use    Smoking status: Never Smoker    Smokeless tobacco: Never Used   Substance Use Topics    Alcohol use: Yes     Comment: socially         Illicit drugs:   Social History     Substance and Sexual Activity   Drug Use No        O:  MSE:  Appearance: good hygiene   Attitude: cooperative and in distress  Consciousness: alert  Orientation: oriented to person, place, time, general circumstance  Memory: recent and remote memory intact  Attention/Concentration: intact during session  Psychomotor Activity: normal  Eye Contact: normal  Speech: normal rate and volume, well-articulated  Mood: less anxious  Affect: congruent  Perception: within normal limits  Thought Content: within normal limits  Thought Process: logical, coherent  Insight: good  Judgment: intact  Ability to understand instructions: Yes  Ability to respond meaningfully: Yes  Morbid Ideation: passive thoughts of death  Suicide Assessment: suicidal ideation without plan or intent  Homicidal Ideation: no    A:  We are taking what the patient presents and we come up with ideas for interventions and/or simple providing support for her. Getting back on some antidepressants has apparently helped her manage her anxiety more effectively. ROSE 7 SCORE 1/27/2022 1/24/2022 12/20/2021 12/13/2021 11/8/2021 11/1/2021 10/25/2021   ROSE-7 Total Score 15 12 17 16 15 16 17     Interpretation of ROSE-7 score: 5-9 = mild anxiety, 10-14 = moderate anxiety, 15+ = severe anxiety. Recommend referral to behavioral health for scores 10 or greater. PHQ Scores 2/7/2022 1/27/2022 1/24/2022 12/20/2021 12/13/2021 11/8/2021 11/1/2021   PHQ2 Score 2 5 5 3 4 3 3   PHQ9 Score 2 16 17 19 20 19 18     Interpretation of Total Score Depression Severity: 1-4 = Minimal depression, 5-9 = Mild depression, 10-14 = Moderate depression, 15-19 = Moderately severe depression, 20-27 = Severe depression    Diagnosis:    1. Major depressive disorder, recurrent episode, moderate ([facility] [address]    2.  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        Pt Behavioral Change Plan:  Pt set the following goals:  Pt scheduled F/U visit in one week  See section 'A'

## 2022-02-21 ENCOUNTER — OFFICE VISIT (OUTPATIENT)
Dept: PSYCHOLOGY | Age: 27
End: 2022-02-21
Payer: MEDICARE

## 2022-02-21 DIAGNOSIS — F33.1 MAJOR DEPRESSIVE DISORDER, RECURRENT EPISODE, MODERATE (HCC): Primary | ICD-10-CM

## 2022-02-21 DIAGNOSIS — F90.2 ATTENTION DEFICIT HYPERACTIVITY DISORDER, COMBINED TYPE, MODERATE: ICD-10-CM

## 2022-02-21 PROCEDURE — 1036F TOBACCO NON-USER: CPT | Performed by: PSYCHOLOGIST

## 2022-02-21 PROCEDURE — 90832 PSYTX W PT 30 MINUTES: CPT | Performed by: PSYCHOLOGIST

## 2022-02-21 NOTE — PROGRESS NOTES
Behavioral Health Consultation  Herrera Nobles, Ph.D.  Psychologist  2/21/2022  10:30 AM EDT      Time spent with Patient: 30 minutes  This is patient's seventeenth [facility] appointment. Reason for Consult: depression, stress  Referring Provider: Cricket Dais, APRN - CNP  [address]  [address] [id number]    Feedback for PCP:     Pt provided informed consent for the behavioral health program. Discussed with patient model of service to include the limits of confidentiality (i.e. abuse reporting, suicide intervention, etc.) and short-term intervention focused approach. Pt indicated understanding. Feedback given to PCP. S:  Patient reports that she uniformly feels tired, but that her mood might be improving a little, now that she's resumed her Zoloft. She feels overwhelmed with issues at home, especially around her fiance getting a job. She says that he also has ADHD and she can see how deficits in executive functions may be hampering his efforts. She wants to address nutrition not only for herself but for her kids,and to stop spending so much money on fast food.          Social History     Tobacco Use    Smoking status: Never Smoker    Smokeless tobacco: Never Used   Substance Use Topics    Alcohol use: Yes     Comment: socially         Illicit drugs:   Social History     Substance and Sexual Activity   Drug Use No        O:  MSE:  Appearance: good hygiene   Attitude: cooperative and in distress  Consciousness: alert  Orientation: oriented to person, place, time, general circumstance  Memory: recent and remote memory intact  Attention/Concentration: intact during session  Psychomotor Activity: normal  Eye Contact: normal  Speech: normal rate and volume, well-articulated  Mood: less anxious  Affect: congruent  Perception: within normal limits  Thought Content: within normal limits  Thought Process: logical, coherent  Insight: good  Judgment: intact  Ability to understand instructions: Yes  Ability to respond meaningfully: Yes  Morbid Ideation: passive thoughts of death  Suicide Assessment: suicidal ideation without plan or intent  Homicidal Ideation: no    A:  We talked about ways of helping her fiance with his job search, but doing so without taking responsibility for him. We talked about ways of maybe making him accountable to her about his job search. We also talked about how making a simple meal plan with just 3 dinners that are inexpensive and easy, may help her cut down on spending money on fast food and increase the nutrition for herself and her family. ROSE 7 SCORE 1/27/2022 1/24/2022 12/20/2021 12/13/2021 11/8/2021 11/1/2021 10/25/2021   ROSE-7 Total Score 15 12 17 16 15 16 17     Interpretation of ROSE-7 score: 5-9 = mild anxiety, 10-14 = moderate anxiety, 15+ = severe anxiety. Recommend referral to behavioral health for scores 10 or greater. PHQ Scores 2/7/2022 1/27/2022 1/24/2022 12/20/2021 12/13/2021 11/8/2021 11/1/2021   PHQ2 Score 2 5 5 3 4 3 3   PHQ9 Score 2 16 17 19 20 19 18     Interpretation of Total Score Depression Severity: 1-4 = Minimal depression, 5-9 = Mild depression, 10-14 = Moderate depression, 15-19 = Moderately severe depression, 20-27 = Severe depression    Diagnosis:    1. Major depressive disorder, recurrent episode, moderate ([address]    2. 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        Pt Behavioral Change Plan:  Pt set the following goals:  Pt scheduled F/U visit in one week  See section 'A'

## 2022-03-07 ENCOUNTER — OFFICE VISIT (OUTPATIENT)
Dept: PSYCHOLOGY | Age: 27
End: 2022-03-07
Payer: MEDICARE

## 2022-03-07 DIAGNOSIS — F90.2 ATTENTION DEFICIT HYPERACTIVITY DISORDER, COMBINED TYPE, MODERATE: ICD-10-CM

## 2022-03-07 DIAGNOSIS — F33.1 MAJOR DEPRESSIVE DISORDER, RECURRENT EPISODE, MODERATE (HCC): Primary | ICD-10-CM

## 2022-03-07 PROCEDURE — 1036F TOBACCO NON-USER: CPT | Performed by: PSYCHOLOGIST

## 2022-03-07 PROCEDURE — 90832 PSYTX W PT 30 MINUTES: CPT | Performed by: PSYCHOLOGIST

## 2022-03-07 ASSESSMENT — ANXIETY QUESTIONNAIRES
5. BEING SO RESTLESS THAT IT IS HARD TO SIT STILL: 1-SEVERAL DAYS
4. TROUBLE RELAXING: 0-NOT AT ALL
1. FEELING NERVOUS, ANXIOUS, OR ON EDGE: 0-NOT AT ALL
2. NOT BEING ABLE TO STOP OR CONTROL WORRYING: 1-SEVERAL DAYS
3. WORRYING TOO MUCH ABOUT DIFFERENT THINGS: 1-SEVERAL DAYS
6. BECOMING EASILY ANNOYED OR IRRITABLE: 2-OVER HALF THE DAYS
7. FEELING AFRAID AS IF SOMETHING AWFUL MIGHT HAPPEN: 2-OVER HALF THE DAYS
GAD7 TOTAL SCORE: 7

## 2022-03-07 ASSESSMENT — PATIENT HEALTH QUESTIONNAIRE - PHQ9
2. FEELING DOWN, DEPRESSED OR HOPELESS: 1
1. LITTLE INTEREST OR PLEASURE IN DOING THINGS: 1
SUM OF ALL RESPONSES TO PHQ QUESTIONS 1-9: 2
SUM OF ALL RESPONSES TO PHQ9 QUESTIONS 1 & 2: 2
SUM OF ALL RESPONSES TO PHQ QUESTIONS 1-9: 2

## 2022-03-07 NOTE — PROGRESS NOTES
Behavioral Health Consultation  Byron Chavira, Ph.D.  Psychologist  3/7/2022  10:30 AM EDT      Time spent with Patient: 17 minutes  This is patient's seventeenth [facility] appointment. Reason for Consult: depression, stress  Referring Provider: AMMY Chakraborty CNP [address]  [id number] [address]    Feedback for PCP:     Pt provided informed consent for the behavioral health program. Discussed with patient model of service to include the limits of confidentiality (i.e. abuse reporting, suicide intervention, etc.) and short-term intervention focused approach. Pt indicated understanding. Feedback given to PCP. S:  Patient reports that she is doing much better and feeling much less stress. She said that she and her fiance got their tax return and they are spending it painting, buying new furniture, moving old furniture out. She said \"it's like a new place,\" which is making her feel good about \"nesting. \" She said that the things that were stressing her out are now seen as manageable. She said that she feels her relationship with her fiance has also improved. She says that her pregnancy is going well and that they found out that she will be having a girl, which she is excited about.          Social History     Tobacco Use    Smoking status: Never Smoker    Smokeless tobacco: Never Used   Substance Use Topics    Alcohol use: Yes     Comment: socially         Illicit drugs:   Social History     Substance and Sexual Activity   Drug Use No        O:  MSE:  Appearance: good hygiene   Attitude: cooperative and in distress  Consciousness: alert  Orientation: oriented to person, place, time, general circumstance  Memory: recent and remote memory intact  Attention/Concentration: intact during session  Psychomotor Activity: normal  Eye Contact: normal  Speech: normal rate and volume, well-articulated  Mood: less anxious  Affect: congruent  Perception: within normal limits  Thought Content: within normal limits  Thought Process: logical, coherent  Insight: good  Judgment: intact  Ability to understand instructions: Yes  Ability to respond meaningfully: Yes  Morbid Ideation: passive thoughts of death  Suicide Assessment: suicidal ideation without plan or intent  Homicidal Ideation: no    A:  We talked about continuing to use her coping tools, even though she feels things are smoothing out. Her mood and affect are positive this visit. ROSE 7 SCORE 3/7/2022 1/27/2022 1/24/2022 12/20/2021 12/13/2021 11/8/2021 11/1/2021   ROSE-7 Total Score 7 15 12 17 16 15 16     Interpretation of ROSE-7 score: 5-9 = mild anxiety, 10-14 = moderate anxiety, 15+ = severe anxiety. Recommend referral to behavioral health for scores 10 or greater. PHQ Scores 3/7/2022 2/7/2022 1/27/2022 1/24/2022 12/20/2021 12/13/2021 11/8/2021   PHQ2 Score 2 2 5 5 3 4 3   PHQ9 Score 2 2 16 17 19 20 19     Interpretation of Total Score Depression Severity: 1-4 = Minimal depression, 5-9 = Mild depression, 10-14 = Moderate depression, 15-19 = Moderately severe depression, 20-27 = Severe depression    Diagnosis:    1. Major depressive disorder, recurrent episode, moderate ([address]    2. 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        Pt Behavioral Change Plan:  Pt set the following goals:  Pt scheduled F/U visit in three weeks  See section 'A'

## 2022-03-28 ENCOUNTER — OFFICE VISIT (OUTPATIENT)
Dept: PSYCHOLOGY | Age: 27
End: 2022-03-28
Payer: MEDICARE

## 2022-03-28 DIAGNOSIS — F34.1 PERSISTENT DEPRESSIVE DISORDER WITH ANXIOUS DISTRESS, CURRENTLY MODERATE: ICD-10-CM

## 2022-03-28 PROCEDURE — 90832 PSYTX W PT 30 MINUTES: CPT | Performed by: PSYCHOLOGIST

## 2022-03-28 PROCEDURE — 1036F TOBACCO NON-USER: CPT | Performed by: PSYCHOLOGIST

## 2022-03-28 ASSESSMENT — ANXIETY QUESTIONNAIRES
7. FEELING AFRAID AS IF SOMETHING AWFUL MIGHT HAPPEN: 3-NEARLY EVERY DAY
3. WORRYING TOO MUCH ABOUT DIFFERENT THINGS: 2-OVER HALF THE DAYS
GAD7 TOTAL SCORE: 12
4. TROUBLE RELAXING: 1-SEVERAL DAYS
1. FEELING NERVOUS, ANXIOUS, OR ON EDGE: 2-OVER HALF THE DAYS
2. NOT BEING ABLE TO STOP OR CONTROL WORRYING: 1-SEVERAL DAYS
6. BECOMING EASILY ANNOYED OR IRRITABLE: 2-OVER HALF THE DAYS
5. BEING SO RESTLESS THAT IT IS HARD TO SIT STILL: 1-SEVERAL DAYS

## 2022-03-28 ASSESSMENT — PATIENT HEALTH QUESTIONNAIRE - PHQ9
SUM OF ALL RESPONSES TO PHQ QUESTIONS 1-9: 2
1. LITTLE INTEREST OR PLEASURE IN DOING THINGS: 1
2. FEELING DOWN, DEPRESSED OR HOPELESS: 1
SUM OF ALL RESPONSES TO PHQ QUESTIONS 1-9: 2
SUM OF ALL RESPONSES TO PHQ9 QUESTIONS 1 & 2: 2

## 2022-03-28 NOTE — PROGRESS NOTES
Behavioral Health Consultation  Susy Bartholomew, Ph.D.  Psychologist  3/28/2022  10:30 AM EDT      Time spent with Patient: 25 minutes  This is patient's seventeenth [facility] appointment. Reason for Consult: depression, stress  Referring Provider: AMMY Calvo CNP [address]  [address] [facility] [address]    Feedback for PCP:     Pt provided informed consent for the behavioral health program. Discussed with patient model of service to include the limits of confidentiality (i.e. abuse reporting, suicide intervention, etc.) and short-term intervention focused approach. Pt indicated understanding. Feedback given to PCP. S:  Patient reports that she has stress over \"feeling safe,\" and how some people have tried to talk her out of taking a realistic view of danger in the world. She said that there are new issues with her  and that even after she exercised due diligence with paying the  center on time, they called her and said that her payment wasn't recorded and that she will have to supply proof of payment for her children to continue there. She said that the pregnancy is going smoothly.          Social History     Tobacco Use    Smoking status: Never Smoker    Smokeless tobacco: Never Used   Substance Use Topics    Alcohol use: Yes     Comment: socially         Illicit drugs:   Social History     Substance and Sexual Activity   Drug Use No        O:  MSE:  Appearance: good hygiene   Attitude: cooperative and in distress  Consciousness: alert  Orientation: oriented to person, place, time, general circumstance  Memory: recent and remote memory intact  Attention/Concentration: intact during session  Psychomotor Activity: normal  Eye Contact: normal  Speech: normal rate and volume, well-articulated  Mood: less anxious  Affect: congruent  Perception: within normal limits  Thought Content: within normal limits  Thought Process: logical, coherent  Insight: good  Judgment: intact  Ability to understand instructions: Yes  Ability to respond meaningfully: Yes  Morbid Ideation: passive thoughts of death  Suicide Assessment: suicidal ideation without plan or intent  Homicidal Ideation: no    A:  We talked about her frustration that after her due diligence, there were still problems with the . She said that she is using her stress management tools, and feels less anxious overall. ROSE 7 SCORE 4/18/2022 3/28/2022 3/7/2022 1/27/2022 1/24/2022 12/20/2021 12/13/2021   ROSE-7 Total Score 14 12 7 15 12 17 16     Interpretation of ROSE-7 score: 5-9 = mild anxiety, 10-14 = moderate anxiety, 15+ = severe anxiety. Recommend referral to behavioral health for scores 10 or greater. PHQ Scores 4/18/2022 3/28/2022 3/7/2022 2/7/2022 1/27/2022 1/24/2022 12/20/2021   PHQ2 Score 2 2 2 2 5 5 3   PHQ9 Score 2 2 2 2 16 17 19     Interpretation of Total Score Depression Severity: 1-4 = Minimal depression, 5-9 = Mild depression, 10-14 = Moderate depression, 15-19 = Moderately severe depression, 20-27 = Severe depression    Diagnosis:    1. 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        Pt Behavioral Change Plan:  Pt set the following goals:  Pt scheduled F/U visit in three weeks  See section 'A'

## 2022-04-18 ENCOUNTER — OFFICE VISIT (OUTPATIENT)
Dept: PSYCHOLOGY | Age: 27
End: 2022-04-18
Payer: MEDICARE

## 2022-04-18 DIAGNOSIS — F34.1 PERSISTENT DEPRESSIVE DISORDER WITH ANXIOUS DISTRESS, CURRENTLY MODERATE: Primary | ICD-10-CM

## 2022-04-18 PROCEDURE — 1036F TOBACCO NON-USER: CPT | Performed by: PSYCHOLOGIST

## 2022-04-18 PROCEDURE — 90832 PSYTX W PT 30 MINUTES: CPT | Performed by: PSYCHOLOGIST

## 2022-04-18 ASSESSMENT — ANXIETY QUESTIONNAIRES
GAD7 TOTAL SCORE: 14
3. WORRYING TOO MUCH ABOUT DIFFERENT THINGS: 2-OVER HALF THE DAYS
5. BEING SO RESTLESS THAT IT IS HARD TO SIT STILL: 1-SEVERAL DAYS
2. NOT BEING ABLE TO STOP OR CONTROL WORRYING: 2-OVER HALF THE DAYS
6. BECOMING EASILY ANNOYED OR IRRITABLE: 3-NEARLY EVERY DAY
4. TROUBLE RELAXING: 1-SEVERAL DAYS
7. FEELING AFRAID AS IF SOMETHING AWFUL MIGHT HAPPEN: 3-NEARLY EVERY DAY
1. FEELING NERVOUS, ANXIOUS, OR ON EDGE: 2

## 2022-04-18 ASSESSMENT — PATIENT HEALTH QUESTIONNAIRE - PHQ9
SUM OF ALL RESPONSES TO PHQ QUESTIONS 1-9: 2
SUM OF ALL RESPONSES TO PHQ9 QUESTIONS 1 & 2: 2
1. LITTLE INTEREST OR PLEASURE IN DOING THINGS: 1
SUM OF ALL RESPONSES TO PHQ QUESTIONS 1-9: 2
2. FEELING DOWN, DEPRESSED OR HOPELESS: 1

## 2022-04-18 NOTE — PROGRESS NOTES
Behavioral Health Consultation  Kaci Sabillon, Ph.D.  Psychologist  4/18/2022  11:00 AM EDT      Time spent with Patient: 25 minutes  This is patient's eighteenth [facility] appointment. Reason for Consult: depression, stress  Referring Provider: AMMY Hawk - CNP  [address]  [address] [place] [address]    Feedback for PCP:     Pt provided informed consent for the behavioral health program. Discussed with patient model of service to include the limits of confidentiality (i.e. abuse reporting, suicide intervention, etc.) and short-term intervention focused approach. Pt indicated understanding. Feedback given to PCP. S:  Patient reports that her mery got a job and that has allowed her to have more choices with regard to how much she works. She said that there has been some adjustment in restful sleep since her fiance is working third shift, but she believes that she's getting used to it. She says her pregnancy is still going smoothly, with her weight and nutrition in good shape. She has some fatigue and worries she isn't doing as much as she should at home, but it doesn't rise to the level of stress. Her due date is August 9 and her name at this time is Olegario.          Social History     Tobacco Use    Smoking status: Never Smoker    Smokeless tobacco: Never Used   Substance Use Topics    Alcohol use: Yes     Comment: socially         Illicit drugs:   Social History     Substance and Sexual Activity   Drug Use No        O:  MSE:  Appearance: good hygiene   Attitude: cooperative and in distress  Consciousness: alert  Orientation: oriented to person, place, time, general circumstance  Memory: recent and remote memory intact  Attention/Concentration: intact during session  Psychomotor Activity: normal  Eye Contact: normal  Speech: normal rate and volume, well-articulated  Mood: less anxious  Affect: congruent  Perception: within normal limits  Thought Content: within normal limits  Thought Process: logical, coherent  Insight: good  Judgment: intact  Ability to understand instructions: Yes  Ability to respond meaningfully: Yes  Morbid Ideation: passive thoughts of death  Suicide Assessment: suicidal ideation without plan or intent  Homicidal Ideation: no    A:  Patient doing well, and better than she thought she's be doing. Her pregnancy has grounded her in some ways that now she says little things don't seem to bother her as much as they did before. ROSE 7 SCORE 4/18/2022 3/28/2022 3/7/2022 1/27/2022 1/24/2022 12/20/2021 12/13/2021   ROSE-7 Total Score 14 12 7 15 12 17 16     Interpretation of ROSE-7 score: 5-9 = mild anxiety, 10-14 = moderate anxiety, 15+ = severe anxiety. Recommend referral to behavioral health for scores 10 or greater. PHQ Scores 4/18/2022 3/28/2022 3/7/2022 2/7/2022 1/27/2022 1/24/2022 12/20/2021   PHQ2 Score 2 2 2 2 5 5 3   PHQ9 Score 2 2 2 2 16 17 19     Interpretation of Total Score Depression Severity: 1-4 = Minimal depression, 5-9 = Mild depression, 10-14 = Moderate depression, 15-19 = Moderately severe depression, 20-27 = Severe depression    Diagnosis:    1. 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        Pt Behavioral Change Plan:  Pt set the following goals:  Pt scheduled F/U visit in three weeks  See section 'A'

## 2022-04-19 PROBLEM — F34.1 PERSISTENT DEPRESSIVE DISORDER WITH ANXIOUS DISTRESS, CURRENTLY MODERATE: Status: ACTIVE | Noted: 2022-04-19

## 2022-05-26 ENCOUNTER — OFFICE VISIT (OUTPATIENT)
Dept: PSYCHIATRY | Age: 27
End: 2022-05-26
Payer: MEDICARE

## 2022-05-26 DIAGNOSIS — F90.0 ADHD (ATTENTION DEFICIT HYPERACTIVITY DISORDER), INATTENTIVE TYPE: ICD-10-CM

## 2022-05-26 DIAGNOSIS — F43.10 PTSD (POST-TRAUMATIC STRESS DISORDER): Primary | ICD-10-CM

## 2022-05-26 DIAGNOSIS — F41.9 ANXIETY: ICD-10-CM

## 2022-05-26 PROCEDURE — G8420 CALC BMI NORM PARAMETERS: HCPCS | Performed by: NURSE PRACTITIONER

## 2022-05-26 PROCEDURE — G8427 DOCREV CUR MEDS BY ELIG CLIN: HCPCS | Performed by: NURSE PRACTITIONER

## 2022-05-26 PROCEDURE — 99214 OFFICE O/P EST MOD 30 MIN: CPT | Performed by: NURSE PRACTITIONER

## 2022-05-26 PROCEDURE — 1036F TOBACCO NON-USER: CPT | Performed by: NURSE PRACTITIONER

## 2022-05-26 RX ORDER — SERTRALINE HYDROCHLORIDE 100 MG/1
100 TABLET, FILM COATED ORAL DAILY
Qty: 30 TABLET | Refills: 0 | Status: SHIPPED | OUTPATIENT
Start: 2022-05-26 | End: 2022-06-25

## 2022-05-26 ASSESSMENT — ANXIETY QUESTIONNAIRES
4. TROUBLE RELAXING: 2-OVER HALF THE DAYS
1. FEELING NERVOUS, ANXIOUS, OR ON EDGE: 3
3. WORRYING TOO MUCH ABOUT DIFFERENT THINGS: 3-NEARLY EVERY DAY
2. NOT BEING ABLE TO STOP OR CONTROL WORRYING: 3-NEARLY EVERY DAY
5. BEING SO RESTLESS THAT IT IS HARD TO SIT STILL: 1-SEVERAL DAYS
7. FEELING AFRAID AS IF SOMETHING AWFUL MIGHT HAPPEN: 3-NEARLY EVERY DAY
GAD7 TOTAL SCORE: 17
6. BECOMING EASILY ANNOYED OR IRRITABLE: 2-OVER HALF THE DAYS

## 2022-05-26 ASSESSMENT — PATIENT HEALTH QUESTIONNAIRE - PHQ9
9. THOUGHTS THAT YOU WOULD BE BETTER OFF DEAD, OR OF HURTING YOURSELF: 1
SUM OF ALL RESPONSES TO PHQ QUESTIONS 1-9: 12
7. TROUBLE CONCENTRATING ON THINGS, SUCH AS READING THE NEWSPAPER OR WATCHING TELEVISION: 1
2. FEELING DOWN, DEPRESSED OR HOPELESS: 2
5. POOR APPETITE OR OVEREATING: 1
10. IF YOU CHECKED OFF ANY PROBLEMS, HOW DIFFICULT HAVE THESE PROBLEMS MADE IT FOR YOU TO DO YOUR WORK, TAKE CARE OF THINGS AT HOME, OR GET ALONG WITH OTHER PEOPLE: 1
SUM OF ALL RESPONSES TO PHQ QUESTIONS 1-9: 11
1. LITTLE INTEREST OR PLEASURE IN DOING THINGS: 2
6. FEELING BAD ABOUT YOURSELF - OR THAT YOU ARE A FAILURE OR HAVE LET YOURSELF OR YOUR FAMILY DOWN: 1
3. TROUBLE FALLING OR STAYING ASLEEP: 1
8. MOVING OR SPEAKING SO SLOWLY THAT OTHER PEOPLE COULD HAVE NOTICED. OR THE OPPOSITE, BEING SO FIGETY OR RESTLESS THAT YOU HAVE BEEN MOVING AROUND A LOT MORE THAN USUAL: 1
SUM OF ALL RESPONSES TO PHQ QUESTIONS 1-9: 12
SUM OF ALL RESPONSES TO PHQ QUESTIONS 1-9: 12
4. FEELING TIRED OR HAVING LITTLE ENERGY: 2
SUM OF ALL RESPONSES TO PHQ9 QUESTIONS 1 & 2: 4

## 2022-05-26 NOTE — PROGRESS NOTES
PSYCHIATRY PROGRESS NOTE    Shefali Wilson  1995 05/26/22      CC:   Chief Complaint   Patient presents with    Anxiety    Follow-up       HPI:   Shefali Wilson is a 23 y.o. female with h/o anxiety who p/t clinic for follow up. Subjective/Interval Hx: Doing ok. Financial stress. R/S with fiance much better. Baby due in August. Increasing anxiety. Depression improving. Worried about PPD. Location:  [facility]  Severity: Mod  Context:  As above. Modifiers: Stable with meds  Quality: Anxious    Past Psychiatric History:               Prior hospitalizations: Once when she was 14. Suicide attempt with pill OD. Prior diagnoses: ADHD, anxiety, MDD              Outpatient Treatment:                           Psychiatrist: Miguel Alvarez                          Therapist: ALTHEA Barker previously              Suicide Attempts: Once, see above              Hx SH:  Hits herself when she gets upset, however she blacks out and does not remember     Past Psychopharmacologic Trials (including response/reactions):  Elmon Skeeters- which was beneficial for irritability, suicidality and hallcuinations  Adderall   Concerta  Wellbutrin- Helpful  Lexapro   Xanax      ROSE 7 SCORE 5/26/2022 4/18/2022 3/28/2022 3/7/2022 1/27/2022 1/24/2022 12/20/2021   ROSE-7 Total Score 17 14 12 7 15 12 17     Interpretation of ROSE-7 score: 5-9 = mild anxiety, 10-14 = moderate anxiety,   15+ = severe anxiety. Recommend referral to behavioral health for scores 10 or greater.     PHQ-9 Total Score: 12 (5/26/2022 10:03 AM)  Thoughts that you would be better off dead, or of hurting yourself in some way: 1 (5/26/2022 10:03 AM)    Interpretation of PHQ-9 score:  1-4 = minimal depression, 5-9 = mild depression,   10-14 = moderate depression; 15-19 = moderately severe depression, 20-27 = severe depression    Past Medical/Surgical History:   Past Medical History:   Diagnosis Date    Allergic rhinitis     Anxiety     Depression     CUREXTFY(328.2)     Miscarriage 12/15     Past Surgical History:   Procedure Laterality Date    TYMPANOSTOMY TUBE PLACEMENT         Family History   Problem Relation Age of Onset    Asthma Father     Asthma Maternal Grandmother     Asthma Maternal Grandfather     Kidney Disease Sister         frequent UTI's         PCP: Raudel Sortor, APRN - CNP      Allergies: Allergies   Allergen Reactions    Adhesive Tape      SKIN IRRITATION    Zithromax [Azithromycin Dihydrate] Hives    Morphine Nausea And Vomiting and Other (See Comments)     H/A  migraine         Current Medications:   Current Outpatient Medications on File Prior to Visit   Medication Sig Dispense Refill    Prenatal MV-Min-Fe Fum-FA-DHA (PRENATAL MULTIVITAMIN + DHA) 28-0.8 & 200 MG MISC TAKE 1 CAPSULE AND 1 TABLET BY MOUTH DAILY      Prenatal Vit-Fe Sulfate-FA-DHA (PRENATAL VITAMIN/MIN +DHA) 27-0.8-200 MG CAPS Take 1 tablet by mouth daily 90 capsule 2    omeprazole (PRILOSEC) 20 MG delayed release capsule Take 1 capsule by mouth every morning (before breakfast) 90 capsule 1     No current facility-administered medications on file prior to visit. Controlled Substance Monitoring:      OBJECTIVE:  Vitals:    Wt Readings from Last 3 Encounters:   12/15/21 114 lb (51.7 kg)   11/22/21 117 lb (53.1 kg)   09/20/21 123 lb (55.8 kg)     ROS: Denies trouble with fever, rash, headache, vision changes, chest pain, shortness of breath, nausea, extremity pain, weakness, dysuria.      Mental Status Exam:      Appearance    alert, cooperative, appropriate dress for season, well groomed, appears stated age  Muscle strength/tone: no atrophy or abnormal movements  Gait/station: normal  Speech    spontaneous, normal rate and normal volume  Mood    Depressed  Affect    normal affect Congruent to thought content and mood  Thought Content    hopelessness, helplessness and excessive guilt, no delusions voiced  Thought Process    linear   Associations logical connections  Perceptions: denies AH/VH, does not appear preoccupied with the internal environment  Insight    Good  Judgment    Intact  Orientation    oriented to person, place, time, and general circumstances  Memory    recent and remote memory intact  Attention/Concentration    intact  Ability to understand instructions Yes  Ability to respond meaningfully Yes  Language: [address] of knowledge/Intellect: Average  SI:   no suicidal ideation  HI: Denies HI  Labs:     Nurse Only on 03/15/2022   Component Date Value    SARS-CoV-2 03/15/2022 Not Detected    Orders Only on 12/07/2021   Component Date Value    hCG Quant 12/07/2021 328.7        Last Drug screen:  Lab Results   Component Value Date    LABAMPH Neg 11/20/2020    LABBENZ Neg 11/20/2020    COCAIMETSCRU Neg 11/20/2020    LABMETH Neg 11/20/2020    OPIATESCREENURINE Neg 11/20/2020    PHENCYCLIDINESCREENURINE Neg 11/20/2020       Imaging: no head imaging on file      ASSESSMENT AND PLAN     Diagnosis Orders   1. PTSD (post-traumatic stress disorder)     2. ADHD (attention deficit hyperactivity disorder), inattentive type     3. Anxiety         1. Safety: NO Imminent risk of danger to/self/others based on the factors considered below. Appropriate for outpatient level of care.   Safety plan includes: 911, PES, hotlines, and interventions discussed today.      Risk factors: Age <25 or >55, depressed mood,prior suicide attempt, no outpatient services in place, history of violence, and no collateral information to support safety.     Protective factors: female gender, denies suicidal ideation, does not have lethal plan, does not have access to guns or weapons, patient is anthony for safety, no prior suicide attempts, no family h/o suicide, no substance abuse, patient has social or family support, no active psychosis or cognitive dysfunction, physically healthy, already has outpatient services in place, compliant with recommended medications,  and patient is future oriented.        2. Psychiatric Plan     PTSD, Anxiety:     - Increase Zoloft to 100 mg once daily while pregnant. May need this support while off other medications. Anxiety worsening. Depression improving. I am going to message patients OB about potentially using hydroxyzine PRN for increased anxiety. I would do 25 mg daily, 15 pills per month. Patient set up next appt. Had severe PPD after last baby, will be in touch with her quickly to determine next steps and how she is feeling. ADHD: Hold medication for now with pregnancy.      -Labs: reviewed in [address] [place] therapy. Continue with Dr. Samia Casas reviewed, c/w history  -R/b/se/a d/w pt who consents.     3. Medical  -Following with AMMY Reynolds - MICHELLE     4. Substance   -No active issues.     5. RTC - 3 months     [facility] MEHNAZ Cruz CNP  Psychiatric Mental Health Nurse Practitioner     On this date 5/26/2022 I have spent 20 minutes reviewing previous notes, test results and face to face with the patient discussing the diagnosis and importance of compliance with the treatment plan as well as documenting on the day of the visit.

## 2022-05-26 NOTE — Clinical Note
Could you call this patients OB and see if prescribing her Hydroxyzine 25 mg daily PRN for anxiety would be okay with her? I would give her 15 pills for 30 days. Please also let her know that I increased her Zoloft dosage to 100 mg. She had pretty severe post partum after her first child. Thanks!

## 2022-06-09 NOTE — PROGRESS NOTES
I called back to check on answer, spoke to another nurse which stated that she attempted to call [facility] however, her VM box was full. Dr. Tiff Hu stated that it is OK to take both medications together as we all raising her dose.

## 2023-03-03 DIAGNOSIS — Z00.00 PREVENTATIVE HEALTH CARE: Primary | ICD-10-CM

## 2023-03-06 DIAGNOSIS — D50.0 IRON DEFICIENCY ANEMIA DUE TO CHRONIC BLOOD LOSS: Primary | ICD-10-CM

## 2023-03-06 RX ORDER — FERROUS SULFATE 325(65) MG
325 TABLET ORAL 2 TIMES DAILY
Qty: 60 TABLET | Refills: 5 | Status: SHIPPED | OUTPATIENT
Start: 2023-03-06

## 2023-03-23 ENCOUNTER — OFFICE VISIT (OUTPATIENT)
Dept: PRIMARY CARE CLINIC | Age: 28
End: 2023-03-23
Payer: MEDICAID

## 2023-03-23 VITALS
BODY MASS INDEX: 29.64 KG/M2 | WEIGHT: 157 LBS | TEMPERATURE: 98.3 F | SYSTOLIC BLOOD PRESSURE: 130 MMHG | DIASTOLIC BLOOD PRESSURE: 82 MMHG | HEIGHT: 61 IN | OXYGEN SATURATION: 98 % | HEART RATE: 116 BPM

## 2023-03-23 DIAGNOSIS — D50.0 IRON DEFICIENCY ANEMIA DUE TO CHRONIC BLOOD LOSS: ICD-10-CM

## 2023-03-23 DIAGNOSIS — Z00.00 ENCOUNTER FOR PREVENTIVE CARE: Primary | ICD-10-CM

## 2023-03-23 DIAGNOSIS — F41.9 ANXIETY: ICD-10-CM

## 2023-03-23 PROCEDURE — 99395 PREV VISIT EST AGE 18-39: CPT | Performed by: NURSE PRACTITIONER

## 2023-03-23 RX ORDER — BUSPIRONE HYDROCHLORIDE 5 MG/1
5 TABLET ORAL 3 TIMES DAILY
Qty: 90 TABLET | Refills: 1 | Status: SHIPPED | OUTPATIENT
Start: 2023-03-23 | End: 2023-04-22

## 2023-03-23 SDOH — ECONOMIC STABILITY: FOOD INSECURITY: WITHIN THE PAST 12 MONTHS, THE FOOD YOU BOUGHT JUST DIDN'T LAST AND YOU DIDN'T HAVE MONEY TO GET MORE.: NEVER TRUE

## 2023-03-23 SDOH — ECONOMIC STABILITY: FOOD INSECURITY: WITHIN THE PAST 12 MONTHS, YOU WORRIED THAT YOUR FOOD WOULD RUN OUT BEFORE YOU GOT MONEY TO BUY MORE.: NEVER TRUE

## 2023-03-23 SDOH — ECONOMIC STABILITY: HOUSING INSECURITY
IN THE LAST 12 MONTHS, WAS THERE A TIME WHEN YOU DID NOT HAVE A STEADY PLACE TO SLEEP OR SLEPT IN A SHELTER (INCLUDING NOW)?: NO

## 2023-03-23 SDOH — ECONOMIC STABILITY: INCOME INSECURITY: HOW HARD IS IT FOR YOU TO PAY FOR THE VERY BASICS LIKE FOOD, HOUSING, MEDICAL CARE, AND HEATING?: SOMEWHAT HARD

## 2023-03-23 ASSESSMENT — ENCOUNTER SYMPTOMS
WHEEZING: 0
SHORTNESS OF BREATH: 0
COUGH: 0
STRIDOR: 0

## 2023-03-23 NOTE — PROGRESS NOTES
PROGRESS NOTE  Date of Service:  3/23/2023  Address: [place] [address] [facility]  [address] [place] [address] [address] [facility] [address]  Dept: [phone]  Loc: [phone]    Subjective:      Patient ID: [id number]  Mary Ann Toledo is a 33 y.o. female    HPI: patient is here for physical. Patient said exercise she does not do any exercise. Patient said that she does walk around the park. She said she is paranoid about her kids being abducted. Patient said that she does not get done with stuff with family and she needs time to herself. Patient said her anxiety is not good. Review of Systems   Constitutional:  Negative for chills, fatigue and fever. Respiratory:  Negative for cough, shortness of breath, wheezing and stridor. Psychiatric/Behavioral:  Negative for self-injury and sleep disturbance. The patient is nervous/anxious. All other systems reviewed and are negative. Objective:   Physical Exam  Vitals reviewed. Constitutional:       Appearance: Normal appearance. Cardiovascular:      Rate and Rhythm: Normal rate and regular rhythm. Pulmonary:      Effort: Pulmonary effort is normal.      Breath sounds: Normal breath sounds. Skin:     Capillary Refill: Capillary refill takes less than 2 seconds. Neurological:      General: No focal deficit present. Mental Status: She is alert and oriented to person, place, and time. Psychiatric:         Mood and Affect: Mood normal.         Behavior: Behavior normal.         Thought Content: Thought content normal.         Judgment: Judgment normal.       Plan:   1. Encounter for preventive care encourage patient to make better nutritional choices. Gave patient some recommendations today in office. Patient overall blood work looks okay patient was pretty anemic we will continue iron supplement. 2. Anxiety patient's anxiety is not well controlled we will start her on BuSpar.   Educated patient the

## 2023-05-04 ENCOUNTER — TELEMEDICINE (OUTPATIENT)
Dept: PRIMARY CARE CLINIC | Age: 28
End: 2023-05-04
Payer: MEDICAID

## 2023-05-04 DIAGNOSIS — F41.9 ANXIETY: Primary | ICD-10-CM

## 2023-05-04 PROCEDURE — 99213 OFFICE O/P EST LOW 20 MIN: CPT | Performed by: NURSE PRACTITIONER

## 2023-05-04 RX ORDER — BUSPIRONE HYDROCHLORIDE 5 MG/1
5 TABLET ORAL 3 TIMES DAILY
Qty: 270 TABLET | Refills: 1 | Status: SHIPPED | OUTPATIENT
Start: 2023-05-04 | End: 2023-06-03

## 2023-05-04 ASSESSMENT — ANXIETY QUESTIONNAIRES
6. BECOMING EASILY ANNOYED OR IRRITABLE: 2-OVER HALF THE DAYS
1. FEELING NERVOUS, ANXIOUS, OR ON EDGE: 1
7. FEELING AFRAID AS IF SOMETHING AWFUL MIGHT HAPPEN: 1-SEVERAL DAYS
5. BEING SO RESTLESS THAT IT IS HARD TO SIT STILL: 0-NOT AT ALL
GAD7 TOTAL SCORE: 6
2. NOT BEING ABLE TO STOP OR CONTROL WORRYING: 1-SEVERAL DAYS
3. WORRYING TOO MUCH ABOUT DIFFERENT THINGS: 1-SEVERAL DAYS
4. TROUBLE RELAXING: 0-NOT AT ALL

## 2023-05-04 NOTE — PROGRESS NOTES
Musculoskeletal:   [x] Normal gait with no signs of ataxia         [] Normal range of motion of neck        [] Abnormal-       Neurological:        [x] No Facial Asymmetry (Cranial nerve 7 motor function) (limited exam to video visit)          [] No gaze palsy        [] Abnormal-         Skin:        [x] No significant exanthematous lesions or discoloration noted on facial skin         [] Abnormal-            Psychiatric:       [x] Normal Affect [x] No Hallucinations        [] Abnormal-     Other pertinent observable physical exam findings-     ASSESSMENT/PLAN:  1. Anxiety  Patient has not been taking BuSpar on a regular basis when she does take it it does work well recommend patient trying to take it twice a day and see if this helps patient will start keeping it closer to on the way to work so she can take it regularly patient will follow-up in 6 weeks. - busPIRone (BUSPAR) 5 MG tablet; Take 1 tablet by mouth 3 times daily  Dispense: 270 tablet; Refill: 1      Return in about 8 weeks (around 6/29/2023) for mood. Yajaira Anthony is a 33 y.o. female being evaluated by a Virtual Visit (video visit) encounter to address concerns as mentioned above. A caregiver was present when appropriate. Due to this being a TeleHealth encounter ([facility]), evaluation of the following organ systems was limited: Vitals/Constitutional/EENT/Resp/CV/GI//MS/Neuro/Skin/Heme-Lymph-Imm. Pursuant to the emergency declaration under the [facility], [address] [place] [address] authority and the [employer] Act, this Virtual Visit was conducted with patient's (and/or legal guardian's) consent, to reduce the patient's risk of exposure to COVID-19 and provide necessary medical care.   The patient (and/or legal guardian) has also been advised to contact this office for worsening conditions or problems, and seek emergency medical

## 2023-11-20 ENCOUNTER — OFFICE VISIT (OUTPATIENT)
Dept: PRIMARY CARE CLINIC | Age: 28
End: 2023-11-20
Payer: MEDICAID

## 2023-11-20 VITALS
HEART RATE: 92 BPM | BODY MASS INDEX: 31.06 KG/M2 | RESPIRATION RATE: 16 BRPM | DIASTOLIC BLOOD PRESSURE: 72 MMHG | WEIGHT: 164.5 LBS | TEMPERATURE: 98.1 F | SYSTOLIC BLOOD PRESSURE: 118 MMHG | OXYGEN SATURATION: 98 % | HEIGHT: 61 IN

## 2023-11-20 DIAGNOSIS — F90.0 ATTENTION DEFICIT HYPERACTIVITY DISORDER (ADHD), PREDOMINANTLY INATTENTIVE TYPE: ICD-10-CM

## 2023-11-20 DIAGNOSIS — H65.06 RECURRENT ACUTE SEROUS OTITIS MEDIA OF BOTH EARS: ICD-10-CM

## 2023-11-20 DIAGNOSIS — N92.0 MENORRHAGIA WITH REGULAR CYCLE: Primary | ICD-10-CM

## 2023-11-20 LAB
CONTROL: NORMAL
PREGNANCY TEST URINE, POC: NORMAL

## 2023-11-20 PROCEDURE — 81025 URINE PREGNANCY TEST: CPT | Performed by: NURSE PRACTITIONER

## 2023-11-20 PROCEDURE — 99214 OFFICE O/P EST MOD 30 MIN: CPT | Performed by: NURSE PRACTITIONER

## 2023-11-20 RX ORDER — MEDROXYPROGESTERONE ACETATE 150 MG/ML
150 INJECTION, SUSPENSION INTRAMUSCULAR
Qty: 1 ML | Refills: 3
Start: 2023-11-20

## 2023-11-20 RX ORDER — DEXTROAMPHETAMINE SACCHARATE, AMPHETAMINE ASPARTATE MONOHYDRATE, DEXTROAMPHETAMINE SULFATE AND AMPHETAMINE SULFATE 2.5; 2.5; 2.5; 2.5 MG/1; MG/1; MG/1; MG/1
10 CAPSULE, EXTENDED RELEASE ORAL DAILY
Qty: 30 CAPSULE | Refills: 0 | Status: SHIPPED | OUTPATIENT
Start: 2023-11-20 | End: 2023-12-20

## 2023-11-20 RX ORDER — CEFUROXIME AXETIL 500 MG/1
500 TABLET ORAL 2 TIMES DAILY
Qty: 14 TABLET | Refills: 0 | Status: SHIPPED | OUTPATIENT
Start: 2023-11-20 | End: 2023-11-27

## 2023-11-20 ASSESSMENT — PATIENT HEALTH QUESTIONNAIRE - PHQ9
8. MOVING OR SPEAKING SO SLOWLY THAT OTHER PEOPLE COULD HAVE NOTICED. OR THE OPPOSITE - BEING SO FIDGETY OR RESTLESS THAT YOU HAVE BEEN MOVING AROUND A LOT MORE THAN USUAL: NOT AT ALL
SUM OF ALL RESPONSES TO PHQ QUESTIONS 1-9: 9
3. TROUBLE FALLING OR STAYING ASLEEP: MORE THAN HALF THE DAYS
10. IF YOU CHECKED OFF ANY PROBLEMS, HOW DIFFICULT HAVE THESE PROBLEMS MADE IT FOR YOU TO DO YOUR WORK, TAKE CARE OF THINGS AT HOME, OR GET ALONG WITH OTHER PEOPLE: NOT DIFFICULT AT ALL
SUM OF ALL RESPONSES TO PHQ QUESTIONS 1-9: 9
SUM OF ALL RESPONSES TO PHQ QUESTIONS 1-9: 9
6. FEELING BAD ABOUT YOURSELF - OR THAT YOU ARE A FAILURE OR HAVE LET YOURSELF OR YOUR FAMILY DOWN: NOT AT ALL
7. TROUBLE CONCENTRATING ON THINGS, SUCH AS READING THE NEWSPAPER OR WATCHING TELEVISION: MORE THAN HALF THE DAYS
2. FEELING DOWN, DEPRESSED OR HOPELESS: 0
1. LITTLE INTEREST OR PLEASURE IN DOING THINGS: SEVERAL DAYS
2. FEELING DOWN, DEPRESSED OR HOPELESS: NOT AT ALL
9. THOUGHTS THAT YOU WOULD BE BETTER OFF DEAD, OR OF HURTING YOURSELF: NOT AT ALL
8. MOVING OR SPEAKING SO SLOWLY THAT OTHER PEOPLE COULD HAVE NOTICED. OR THE OPPOSITE, BEING SO FIGETY OR RESTLESS THAT YOU HAVE BEEN MOVING AROUND A LOT MORE THAN USUAL: 0
10. IF YOU CHECKED OFF ANY PROBLEMS, HOW DIFFICULT HAVE THESE PROBLEMS MADE IT FOR YOU TO DO YOUR WORK, TAKE CARE OF THINGS AT HOME, OR GET ALONG WITH OTHER PEOPLE: 0
SUM OF ALL RESPONSES TO PHQ QUESTIONS 1-9: 9
5. POOR APPETITE OR OVEREATING: MORE THAN HALF THE DAYS
SUM OF ALL RESPONSES TO PHQ QUESTIONS 1-9: 9
4. FEELING TIRED OR HAVING LITTLE ENERGY: 2
4. FEELING TIRED OR HAVING LITTLE ENERGY: MORE THAN HALF THE DAYS
9. THOUGHTS THAT YOU WOULD BE BETTER OFF DEAD, OR OF HURTING YOURSELF: 0
5. POOR APPETITE OR OVEREATING: 2
SUM OF ALL RESPONSES TO PHQ9 QUESTIONS 1 & 2: 1
3. TROUBLE FALLING OR STAYING ASLEEP: 2
7. TROUBLE CONCENTRATING ON THINGS, SUCH AS READING THE NEWSPAPER OR WATCHING TELEVISION: 2
6. FEELING BAD ABOUT YOURSELF - OR THAT YOU ARE A FAILURE OR HAVE LET YOURSELF OR YOUR FAMILY DOWN: 0
1. LITTLE INTEREST OR PLEASURE IN DOING THINGS: 1

## 2023-11-20 ASSESSMENT — ENCOUNTER SYMPTOMS: PHOTOPHOBIA: 0

## 2023-11-20 NOTE — PROGRESS NOTES
PROGRESS NOTE  Date of Service:  11/20/2023  Address: [address] [place] [address] PRIMARY CARE  [address] [place], [place] [address] [address]  Dept: [phone]  Loc: [phone]    Subjective:      Patient ID: [id number]  Maude Schmid is a 33 y.o. female    HPI: patient is here for adhd follow up, patient psychiatrist moved to [facility]. Patient was on several different medications. Patient said that she felt adderall worked the best with less side effects. Patient said that she started last Tuesday with with covid like symptoms. Patient said she tested positive. Patient has headaches still. Patient cough is gone. Patient said she still has migraines behind eyes and in the front. Patient denies congestion. Patient did have blurred vision but it is improved     Review of Systems   Constitutional:  Negative for chills, fatigue and fever. Eyes:  Negative for photophobia and visual disturbance. Neurological:  Positive for headaches. Psychiatric/Behavioral:  Positive for decreased concentration. Negative for agitation. All other systems reviewed and are negative. Objective:   Physical Exam  Vitals reviewed. Constitutional:       Appearance: Normal appearance. She is well-developed. HENT:      Head: Normocephalic and atraumatic. Right Ear: Tympanic membrane, ear canal and external ear normal.      Left Ear: Tympanic membrane, ear canal and external ear normal.      Nose: Nose normal.      Mouth/Throat:      Pharynx: Uvula midline. No oropharyngeal exudate. Cardiovascular:      Rate and Rhythm: Normal rate and regular rhythm. Pulses: Normal pulses. Heart sounds: Normal heart sounds. No murmur heard. Pulmonary:      Effort: Pulmonary effort is normal.      Breath sounds: Normal breath sounds. Skin:     General: Skin is warm and dry. Capillary Refill: Capillary refill takes less than 2 seconds. Neurological:      General: No focal deficit present.

## 2023-11-22 DIAGNOSIS — N92.0 MENORRHAGIA WITH REGULAR CYCLE: ICD-10-CM

## 2023-11-22 RX ORDER — MEDROXYPROGESTERONE ACETATE 150 MG/ML
150 INJECTION, SUSPENSION INTRAMUSCULAR
Qty: 1 ML | Refills: 3 | OUTPATIENT
Start: 2023-11-22

## 2023-11-22 NOTE — TELEPHONE ENCOUNTER
Medication:   Requested Prescriptions     Pending Prescriptions Disp Refills    medroxyPROGESTERone (DEPO-PROVERA) 150 MG/ML injection 1 mL 3     Sig: Inject 1 mL into the muscle every 3 months        Last Filled:  11/20/2023 1 ml, 3 refills    Patient Phone Number: [phone] (home)     Last appt: 11/20/2023   Next appt: 12/22/2023    Last OARRS:       6/23/2021     5:49 PM   RX Monitoring   Periodic Controlled Substance Monitoring No signs of potential drug abuse or diversion identified.

## 2024-01-10 ENCOUNTER — OFFICE VISIT (OUTPATIENT)
Dept: PRIMARY CARE CLINIC | Age: 29
End: 2024-01-10
Payer: MEDICAID

## 2024-01-10 VITALS
SYSTOLIC BLOOD PRESSURE: 116 MMHG | TEMPERATURE: 97.4 F | WEIGHT: 158 LBS | HEIGHT: 61 IN | BODY MASS INDEX: 29.83 KG/M2 | DIASTOLIC BLOOD PRESSURE: 72 MMHG | OXYGEN SATURATION: 99 % | HEART RATE: 98 BPM

## 2024-01-10 DIAGNOSIS — B02.7 DISSEMINATED HERPES ZOSTER: Primary | ICD-10-CM

## 2024-01-10 DIAGNOSIS — M54.2 NECK PAIN: ICD-10-CM

## 2024-01-10 PROCEDURE — 99214 OFFICE O/P EST MOD 30 MIN: CPT | Performed by: NURSE PRACTITIONER

## 2024-01-10 RX ORDER — PREDNISONE 20 MG/1
40 TABLET ORAL DAILY
Qty: 10 TABLET | Refills: 0 | Status: SHIPPED | OUTPATIENT
Start: 2024-01-10 | End: 2024-01-15

## 2024-01-10 RX ORDER — VALACYCLOVIR HYDROCHLORIDE 1 G/1
1000 TABLET, FILM COATED ORAL 3 TIMES DAILY
Qty: 21 TABLET | Refills: 0 | Status: SHIPPED | OUTPATIENT
Start: 2024-01-10 | End: 2024-01-17

## 2024-01-10 RX ORDER — METHOCARBAMOL 500 MG/1
500 TABLET, FILM COATED ORAL 4 TIMES DAILY PRN
Qty: 40 TABLET | Refills: 0 | Status: SHIPPED | OUTPATIENT
Start: 2024-01-10 | End: 2024-01-20

## 2024-01-10 ASSESSMENT — ENCOUNTER SYMPTOMS: BACK PAIN: 1

## 2024-01-10 NOTE — PROGRESS NOTES
PROGRESS NOTE  Date of Service:  1/10/2024  [place] [facility]  [address]  [facility] [address]  Dept: [phone]  Loc: [phone]    Subjective:      Patient ID: [id number]  China Kline is a 28 y.o. female    HPI: patient is here for upper right shoulder pain, numbness down right arm. Patient denies loss of strength in right arm.     Patient is taking 800 mg of ibuprofen which has helped. Patient said she started having pain in upper back near neck on Monday, 2 weeks with shoulder pain.         Review of Systems   Musculoskeletal:  Positive for back pain, neck pain and neck stiffness.   Psychiatric/Behavioral:  Negative for self-injury. The patient is not nervous/anxious.      Objective:   Physical Exam  Vitals reviewed.   Constitutional:       Appearance: Normal appearance.   Cardiovascular:      Rate and Rhythm: Normal rate and regular rhythm.      Pulses: Normal pulses.      Heart sounds: Normal heart sounds.   Pulmonary:      Effort: Pulmonary effort is normal.      Breath sounds: Normal breath sounds.   Musculoskeletal:      Cervical back: Rigidity present. No bony tenderness.   Skin:     Capillary Refill: Capillary refill takes less than 2 seconds.   Neurological:      General: No focal deficit present.      Mental Status: She is alert and oriented to person, place, and time.   Psychiatric:         Mood and Affect: Mood normal.         Behavior: Behavior normal.         Thought Content: Thought content normal.         Judgment: Judgment normal.         Plan:   1. Disseminated herpes zoster suspect patient could have shingles.  Has a small vascular rash on the right upper back.  Will treat with valacyclovir and prednisone for her pain patient agrees.  If anything worsens she will call office.  -     valACYclovir (VALTREX) 1 g tablet; Take 1 tablet by mouth 3 times daily for 7 days, Disp-21 tablet, R-0Normal  -     predniSONE

## 2024-03-22 ENCOUNTER — TELEMEDICINE (OUTPATIENT)
Dept: PRIMARY CARE CLINIC | Age: 29
End: 2024-03-22
Payer: COMMERCIAL

## 2024-03-22 DIAGNOSIS — F90.0 ATTENTION DEFICIT HYPERACTIVITY DISORDER (ADHD), PREDOMINANTLY INATTENTIVE TYPE: Primary | ICD-10-CM

## 2024-03-22 DIAGNOSIS — F51.01 PRIMARY INSOMNIA: ICD-10-CM

## 2024-03-22 PROCEDURE — G8428 CUR MEDS NOT DOCUMENT: HCPCS | Performed by: NURSE PRACTITIONER

## 2024-03-22 PROCEDURE — 99214 OFFICE O/P EST MOD 30 MIN: CPT | Performed by: NURSE PRACTITIONER

## 2024-03-22 RX ORDER — DEXTROAMPHETAMINE SACCHARATE, AMPHETAMINE ASPARTATE MONOHYDRATE, DEXTROAMPHETAMINE SULFATE AND AMPHETAMINE SULFATE 5; 5; 5; 5 MG/1; MG/1; MG/1; MG/1
20 CAPSULE, EXTENDED RELEASE ORAL DAILY
Qty: 30 CAPSULE | Refills: 0 | Status: SHIPPED | OUTPATIENT
Start: 2024-03-22 | End: 2024-04-21

## 2024-03-22 RX ORDER — HYDROXYZINE HYDROCHLORIDE 25 MG/1
25-50 TABLET, FILM COATED ORAL NIGHTLY PRN
Qty: 60 TABLET | Refills: 0 | Status: SHIPPED | OUTPATIENT
Start: 2024-03-22

## 2024-03-22 RX ORDER — DEXTROAMPHETAMINE SACCHARATE, AMPHETAMINE ASPARTATE MONOHYDRATE, DEXTROAMPHETAMINE SULFATE AND AMPHETAMINE SULFATE 5; 5; 5; 5 MG/1; MG/1; MG/1; MG/1
20 CAPSULE, EXTENDED RELEASE ORAL DAILY
Qty: 30 CAPSULE | Refills: 0 | Status: SHIPPED | OUTPATIENT
Start: 2024-04-21 | End: 2024-05-21

## 2024-03-22 RX ORDER — DEXTROAMPHETAMINE SACCHARATE, AMPHETAMINE ASPARTATE MONOHYDRATE, DEXTROAMPHETAMINE SULFATE AND AMPHETAMINE SULFATE 5; 5; 5; 5 MG/1; MG/1; MG/1; MG/1
20 CAPSULE, EXTENDED RELEASE ORAL DAILY
Qty: 30 CAPSULE | Refills: 0 | Status: SHIPPED | OUTPATIENT
Start: 2024-05-21 | End: 2024-06-20

## 2024-03-22 ASSESSMENT — ENCOUNTER SYMPTOMS
STRIDOR: 0
SHORTNESS OF BREATH: 0
COUGH: 0

## 2024-04-16 ENCOUNTER — OFFICE VISIT (OUTPATIENT)
Dept: PSYCHOLOGY | Age: 29
End: 2024-04-16
Payer: COMMERCIAL

## 2024-04-16 DIAGNOSIS — F90.2 ATTENTION DEFICIT HYPERACTIVITY DISORDER, COMBINED TYPE, MODERATE: Primary | ICD-10-CM

## 2024-04-16 PROCEDURE — 90832 PSYTX W PT 30 MINUTES: CPT | Performed by: PSYCHOLOGIST

## 2024-04-16 PROCEDURE — 1036F TOBACCO NON-USER: CPT | Performed by: PSYCHOLOGIST

## 2024-04-16 NOTE — PROGRESS NOTES
Behavioral Health Consultation  Aaron Wesley, Ph.D.  Psychologist  4/16/2024  2:30 PM EDT      Time spent with Patient: 25 minutes  This is patient's  nineteenth  [facility] appointment.    Reason for Consult: depression, stress  Referring Provider: Yvrose Cisneros APRN - CNP  [address] [place] [id number]    Feedback for PCP:     Pt provided informed consent for the behavioral health program. Discussed with patient model of service to include the limits of confidentiality (i.e. abuse reporting, suicide intervention, etc.) and short-term intervention focused approach.  Pt indicated understanding.  Feedback given to PCP.    S:  Patient reports some frustration with effective parenting  when she has a sensory overload and her frustration tolerance is low. She feels bad when she gets overwhelmed and loses her temper some times.         Social History     Tobacco Use    Smoking status: Never    Smokeless tobacco: Never   Substance Use Topics    Alcohol use: Yes     Comment: socially         Illicit drugs:   Social History     Substance and Sexual Activity   Drug Use No        O:  MSE:  Appearance: good hygiene   Attitude: cooperative and in distress  Consciousness: alert  Orientation: oriented to person, place, time, general circumstance  Memory: recent and remote memory intact  Attention/Concentration: intact during session  Psychomotor Activity: normal  Eye Contact: normal  Speech: normal rate and volume, well-articulated  Mood: less anxious  Affect: congruent  Perception: within normal limits  Thought Content: within normal limits  Thought Process: logical, coherent  Insight: good  Judgment: intact  Ability to understand instructions: Yes  Ability to respond meaningfully: Yes  Morbid Ideation: passive thoughts of death  Suicide Assessment: suicidal ideation without plan or intent  Homicidal Ideation: no    A:  We talked about establishing a behavior modification plan, and one that would not

## 2024-04-28 DIAGNOSIS — F90.0 ATTENTION DEFICIT HYPERACTIVITY DISORDER (ADHD), PREDOMINANTLY INATTENTIVE TYPE: ICD-10-CM

## 2024-04-29 RX ORDER — DEXTROAMPHETAMINE SACCHARATE, AMPHETAMINE ASPARTATE MONOHYDRATE, DEXTROAMPHETAMINE SULFATE AND AMPHETAMINE SULFATE 5; 5; 5; 5 MG/1; MG/1; MG/1; MG/1
20 CAPSULE, EXTENDED RELEASE ORAL DAILY
Qty: 30 CAPSULE | Refills: 0 | OUTPATIENT
Start: 2024-04-29 | End: 2024-05-29

## 2024-04-29 NOTE — TELEPHONE ENCOUNTER
Medication:   Requested Prescriptions     Pending Prescriptions Disp Refills    amphetamine-dextroamphetamine (ADDERALL XR) 20 MG extended release capsule 30 capsule 0     Sig: Take 1 capsule by mouth daily for 30 days. Max Daily Amount: 20 mg        Last Filled:  03.22.2024 #30    Patient Phone Number: [phone] (home)     Last appt: 3/22/2024   Next appt: 6/17/2024    Last OARRS:       6/23/2021     5:49 PM   RX Monitoring   Periodic Controlled Substance Monitoring No signs of potential drug abuse or diversion identified.

## 2024-05-29 DIAGNOSIS — Z00.00 PREVENTATIVE HEALTH CARE: Primary | ICD-10-CM

## 2024-05-30 DIAGNOSIS — K62.5 RECTAL BLEEDING: Primary | ICD-10-CM

## 2024-06-17 ENCOUNTER — TELEMEDICINE (OUTPATIENT)
Dept: PRIMARY CARE CLINIC | Age: 29
End: 2024-06-17
Payer: COMMERCIAL

## 2024-06-17 DIAGNOSIS — F90.0 ATTENTION DEFICIT HYPERACTIVITY DISORDER (ADHD), PREDOMINANTLY INATTENTIVE TYPE: Primary | ICD-10-CM

## 2024-06-17 DIAGNOSIS — F41.9 ANXIETY: ICD-10-CM

## 2024-06-17 PROCEDURE — G8428 CUR MEDS NOT DOCUMENT: HCPCS | Performed by: NURSE PRACTITIONER

## 2024-06-17 PROCEDURE — G2211 COMPLEX E/M VISIT ADD ON: HCPCS | Performed by: NURSE PRACTITIONER

## 2024-06-17 PROCEDURE — 99214 OFFICE O/P EST MOD 30 MIN: CPT | Performed by: NURSE PRACTITIONER

## 2024-06-17 RX ORDER — DEXTROAMPHETAMINE SACCHARATE, AMPHETAMINE ASPARTATE MONOHYDRATE, DEXTROAMPHETAMINE SULFATE AND AMPHETAMINE SULFATE 5; 5; 5; 5 MG/1; MG/1; MG/1; MG/1
20 CAPSULE, EXTENDED RELEASE ORAL DAILY
Qty: 30 CAPSULE | Refills: 0 | Status: SHIPPED | OUTPATIENT
Start: 2024-08-26 | End: 2024-09-25

## 2024-06-17 RX ORDER — ESCITALOPRAM OXALATE 5 MG/1
5 TABLET ORAL DAILY
Qty: 90 TABLET | Refills: 1 | Status: SHIPPED | OUTPATIENT
Start: 2024-06-17

## 2024-06-17 RX ORDER — DEXTROAMPHETAMINE SACCHARATE, AMPHETAMINE ASPARTATE MONOHYDRATE, DEXTROAMPHETAMINE SULFATE AND AMPHETAMINE SULFATE 5; 5; 5; 5 MG/1; MG/1; MG/1; MG/1
20 CAPSULE, EXTENDED RELEASE ORAL DAILY
Qty: 30 CAPSULE | Refills: 0 | Status: SHIPPED | OUTPATIENT
Start: 2024-07-26 | End: 2024-08-25

## 2024-06-17 RX ORDER — DEXTROAMPHETAMINE SACCHARATE, AMPHETAMINE ASPARTATE MONOHYDRATE, DEXTROAMPHETAMINE SULFATE AND AMPHETAMINE SULFATE 5; 5; 5; 5 MG/1; MG/1; MG/1; MG/1
20 CAPSULE, EXTENDED RELEASE ORAL DAILY
Qty: 30 CAPSULE | Refills: 0 | Status: SHIPPED | OUTPATIENT
Start: 2024-06-27 | End: 2024-07-27

## 2024-06-17 SDOH — ECONOMIC STABILITY: FOOD INSECURITY: WITHIN THE PAST 12 MONTHS, THE FOOD YOU BOUGHT JUST DIDN'T LAST AND YOU DIDN'T HAVE MONEY TO GET MORE.: PATIENT DECLINED

## 2024-06-17 SDOH — ECONOMIC STABILITY: FOOD INSECURITY: WITHIN THE PAST 12 MONTHS, YOU WORRIED THAT YOUR FOOD WOULD RUN OUT BEFORE YOU GOT MONEY TO BUY MORE.: PATIENT DECLINED

## 2024-06-17 SDOH — ECONOMIC STABILITY: HOUSING INSECURITY
IN THE LAST 12 MONTHS, WAS THERE A TIME WHEN YOU DID NOT HAVE A STEADY PLACE TO SLEEP OR SLEPT IN A SHELTER (INCLUDING NOW)?: PATIENT DECLINED

## 2024-06-17 SDOH — ECONOMIC STABILITY: TRANSPORTATION INSECURITY
IN THE PAST 12 MONTHS, HAS LACK OF TRANSPORTATION KEPT YOU FROM MEETINGS, WORK, OR FROM GETTING THINGS NEEDED FOR DAILY LIVING?: PATIENT DECLINED

## 2024-06-17 ASSESSMENT — ENCOUNTER SYMPTOMS
SHORTNESS OF BREATH: 0
COUGH: 0
WHEEZING: 0

## 2024-06-17 NOTE — PROGRESS NOTES
2024    TELEHEALTH EVALUATION -- Audio/Visual    HPI:    China Kline (:  1995) has requested an audio/video evaluation for the following concern(s):    Patient is doing well, patient on vv for adderall, she said that someday are better then others. She said she notices if she does not get much sleep.    She has noticed that her anxiety has been higher.  She is not good about taking medications regularly patient has been on Zoloft in the past.  Patient takes hydroxyzine as needed.    Review of Systems   Constitutional:  Negative for chills, fatigue and fever.   Respiratory:  Negative for cough, shortness of breath and wheezing.    Cardiovascular:  Negative for chest pain.   Psychiatric/Behavioral:  Negative for self-injury, sleep disturbance and suicidal ideas. The patient is not nervous/anxious.    All other systems reviewed and are negative.      Prior to Visit Medications    Medication Sig Taking? Authorizing Provider   amphetamine-dextroamphetamine (ADDERALL XR) 20 MG extended release capsule Take 1 capsule by mouth daily for 30 days. Max Daily Amount: 20 mg Yes Yvrose Cisneros APRN - CNP   amphetamine-dextroamphetamine (ADDERALL XR) 20 MG extended release capsule Take 1 capsule by mouth daily for 30 days. Max Daily Amount: 20 mg Yes Yvrose Cisneros APRN - CNP   amphetamine-dextroamphetamine (ADDERALL XR) 20 MG extended release capsule Take 1 capsule by mouth daily for 30 days. Max Daily Amount: 20 mg Yes Yvrose Cisneros APRN - CNP   escitalopram (LEXAPRO) 5 MG tablet Take 1 tablet by mouth daily Yes Yvrose Cisneros APRN - CNP   hydrOXYzine HCl (ATARAX) 25 MG tablet Take 1-2 tablets by mouth nightly as needed for Anxiety (sleep)  Yvrose Cisneros APRN - CNP   amphetamine-dextroamphetamine (ADDERALL XR) 20 MG extended release capsule Take 1 capsule by mouth daily for 30 days. Max Daily Amount: 20 mg  Yvrose Cisneros APRN - CNP

## 2024-07-31 ENCOUNTER — TELEMEDICINE (OUTPATIENT)
Dept: PRIMARY CARE CLINIC | Age: 29
End: 2024-07-31

## 2024-07-31 DIAGNOSIS — F90.0 ATTENTION DEFICIT HYPERACTIVITY DISORDER (ADHD), PREDOMINANTLY INATTENTIVE TYPE: Primary | ICD-10-CM

## 2024-07-31 DIAGNOSIS — F41.9 ANXIETY: ICD-10-CM

## 2024-07-31 RX ORDER — DEXTROAMPHETAMINE SACCHARATE, AMPHETAMINE ASPARTATE MONOHYDRATE, DEXTROAMPHETAMINE SULFATE AND AMPHETAMINE SULFATE 5; 5; 5; 5 MG/1; MG/1; MG/1; MG/1
20 CAPSULE, EXTENDED RELEASE ORAL DAILY
Qty: 30 CAPSULE | Refills: 0 | Status: SHIPPED | OUTPATIENT
Start: 2024-08-08 | End: 2024-09-07

## 2024-07-31 RX ORDER — ESCITALOPRAM OXALATE 10 MG/1
10 TABLET ORAL DAILY
Qty: 90 TABLET | Refills: 0 | Status: SHIPPED | OUTPATIENT
Start: 2024-07-31

## 2024-07-31 ASSESSMENT — ENCOUNTER SYMPTOMS
SHORTNESS OF BREATH: 0
COUGH: 0

## 2024-07-31 NOTE — PROGRESS NOTES
medication but her mood has been slightly decreased will increase her lexapro. Patient states understanding will follow up in 8 weeks.   - escitalopram (LEXAPRO) 10 MG tablet; Take 1 tablet by mouth daily  Dispense: 90 tablet; Refill: 0    2. Attention deficit hyperactivity disorder (ADHD), predominantly inattentive type  Will see if increasing the lexapro helps her adhd, if not will think about 10 mg twice a day non extended release.   - amphetamine-dextroamphetamine (ADDERALL XR) 20 MG extended release capsule; Take 1 capsule by mouth daily for 30 days. Max Daily Amount: 20 mg  Dispense: 30 capsule; Refill: 0      Return in about 3 months (around 10/31/2024) for adhd.    China Kline, was evaluated through a synchronous (real-time) audio-video encounter. The patient (or guardian if applicable) is aware that this is a billable service, which includes applicable co-pays. This Virtual Visit was conducted with patient's (and/or legal guardian's) consent. Patient identification was verified, and a caregiver was present when appropriate.   The patient was located at Home: [address] [facility] [address]  Provider was located at Facility (Appt Dept): [address]  [place] [address]  Confirm you are appropriately licensed, registered, or certified to deliver care in the [facility] where the patient is located as indicated above. If you are not or unsure, please re-schedule the visit: Yes, I confirm.       Total time spent on this encounter: Not billed by time    --AMMY Salazar CNP on 7/31/2024 at 11:52 AM    An electronic signature was used to authenticate this note.

## 2024-09-18 DIAGNOSIS — F90.0 ATTENTION DEFICIT HYPERACTIVITY DISORDER (ADHD), PREDOMINANTLY INATTENTIVE TYPE: Primary | ICD-10-CM

## 2024-09-18 RX ORDER — DEXTROAMPHETAMINE SACCHARATE, AMPHETAMINE ASPARTATE MONOHYDRATE, DEXTROAMPHETAMINE SULFATE AND AMPHETAMINE SULFATE 2.5; 2.5; 2.5; 2.5 MG/1; MG/1; MG/1; MG/1
20 CAPSULE, EXTENDED RELEASE ORAL DAILY
Qty: 60 CAPSULE | Refills: 0 | Status: SHIPPED | OUTPATIENT
Start: 2024-09-18 | End: 2024-10-18

## 2024-10-21 ENCOUNTER — OFFICE VISIT (OUTPATIENT)
Dept: PRIMARY CARE CLINIC | Age: 29
End: 2024-10-21
Payer: COMMERCIAL

## 2024-10-21 VITALS
DIASTOLIC BLOOD PRESSURE: 68 MMHG | RESPIRATION RATE: 16 BRPM | HEART RATE: 108 BPM | HEIGHT: 61 IN | WEIGHT: 148.6 LBS | BODY MASS INDEX: 28.05 KG/M2 | SYSTOLIC BLOOD PRESSURE: 112 MMHG | OXYGEN SATURATION: 98 % | TEMPERATURE: 98.6 F

## 2024-10-21 DIAGNOSIS — R00.2 PALPITATIONS: ICD-10-CM

## 2024-10-21 DIAGNOSIS — I47.9 PAROXYSMAL TACHYCARDIA (HCC): ICD-10-CM

## 2024-10-21 DIAGNOSIS — F33.2 SEVERE EPISODE OF RECURRENT MAJOR DEPRESSIVE DISORDER, WITHOUT PSYCHOTIC FEATURES (HCC): ICD-10-CM

## 2024-10-21 DIAGNOSIS — F90.0 ATTENTION DEFICIT HYPERACTIVITY DISORDER (ADHD), PREDOMINANTLY INATTENTIVE TYPE: ICD-10-CM

## 2024-10-21 DIAGNOSIS — F41.9 ANXIETY: Primary | ICD-10-CM

## 2024-10-21 DIAGNOSIS — H65.06 RECURRENT ACUTE SEROUS OTITIS MEDIA OF BOTH EARS: ICD-10-CM

## 2024-10-21 DIAGNOSIS — R07.9 CHEST PAIN, UNSPECIFIED TYPE: ICD-10-CM

## 2024-10-21 DIAGNOSIS — D50.0 IRON DEFICIENCY ANEMIA DUE TO CHRONIC BLOOD LOSS: ICD-10-CM

## 2024-10-21 PROCEDURE — G8484 FLU IMMUNIZE NO ADMIN: HCPCS | Performed by: NURSE PRACTITIONER

## 2024-10-21 PROCEDURE — G8427 DOCREV CUR MEDS BY ELIG CLIN: HCPCS | Performed by: NURSE PRACTITIONER

## 2024-10-21 PROCEDURE — 99214 OFFICE O/P EST MOD 30 MIN: CPT | Performed by: NURSE PRACTITIONER

## 2024-10-21 PROCEDURE — G8417 CALC BMI ABV UP PARAM F/U: HCPCS | Performed by: NURSE PRACTITIONER

## 2024-10-21 PROCEDURE — 1036F TOBACCO NON-USER: CPT | Performed by: NURSE PRACTITIONER

## 2024-10-21 PROCEDURE — G2211 COMPLEX E/M VISIT ADD ON: HCPCS | Performed by: NURSE PRACTITIONER

## 2024-10-21 ASSESSMENT — PATIENT HEALTH QUESTIONNAIRE - PHQ9
1. LITTLE INTEREST OR PLEASURE IN DOING THINGS: SEVERAL DAYS
1. LITTLE INTEREST OR PLEASURE IN DOING THINGS: SEVERAL DAYS
10. IF YOU CHECKED OFF ANY PROBLEMS, HOW DIFFICULT HAVE THESE PROBLEMS MADE IT FOR YOU TO DO YOUR WORK, TAKE CARE OF THINGS AT HOME, OR GET ALONG WITH OTHER PEOPLE: NOT DIFFICULT AT ALL
6. FEELING BAD ABOUT YOURSELF - OR THAT YOU ARE A FAILURE OR HAVE LET YOURSELF OR YOUR FAMILY DOWN: NOT AT ALL
3. TROUBLE FALLING OR STAYING ASLEEP: SEVERAL DAYS
SUM OF ALL RESPONSES TO PHQ QUESTIONS 1-9: 3
SUM OF ALL RESPONSES TO PHQ9 QUESTIONS 1 & 2: 1
4. FEELING TIRED OR HAVING LITTLE ENERGY: SEVERAL DAYS
SUM OF ALL RESPONSES TO PHQ QUESTIONS 1-9: 3
SUM OF ALL RESPONSES TO PHQ QUESTIONS 1-9: 3
2. FEELING DOWN, DEPRESSED OR HOPELESS: NOT AT ALL
4. FEELING TIRED OR HAVING LITTLE ENERGY: SEVERAL DAYS
5. POOR APPETITE OR OVEREATING: NOT AT ALL
SUM OF ALL RESPONSES TO PHQ QUESTIONS 1-9: 3
2. FEELING DOWN, DEPRESSED OR HOPELESS: NOT AT ALL
8. MOVING OR SPEAKING SO SLOWLY THAT OTHER PEOPLE COULD HAVE NOTICED. OR THE OPPOSITE - BEING SO FIDGETY OR RESTLESS THAT YOU HAVE BEEN MOVING AROUND A LOT MORE THAN USUAL: NOT AT ALL
7. TROUBLE CONCENTRATING ON THINGS, SUCH AS READING THE NEWSPAPER OR WATCHING TELEVISION: NOT AT ALL
5. POOR APPETITE OR OVEREATING: NOT AT ALL
6. FEELING BAD ABOUT YOURSELF - OR THAT YOU ARE A FAILURE OR HAVE LET YOURSELF OR YOUR FAMILY DOWN: NOT AT ALL
3. TROUBLE FALLING OR STAYING ASLEEP: SEVERAL DAYS
10. IF YOU CHECKED OFF ANY PROBLEMS, HOW DIFFICULT HAVE THESE PROBLEMS MADE IT FOR YOU TO DO YOUR WORK, TAKE CARE OF THINGS AT HOME, OR GET ALONG WITH OTHER PEOPLE: NOT DIFFICULT AT ALL
8. MOVING OR SPEAKING SO SLOWLY THAT OTHER PEOPLE COULD HAVE NOTICED. OR THE OPPOSITE, BEING SO FIGETY OR RESTLESS THAT YOU HAVE BEEN MOVING AROUND A LOT MORE THAN USUAL: NOT AT ALL
7. TROUBLE CONCENTRATING ON THINGS, SUCH AS READING THE NEWSPAPER OR WATCHING TELEVISION: NOT AT ALL
9. THOUGHTS THAT YOU WOULD BE BETTER OFF DEAD, OR OF HURTING YOURSELF: NOT AT ALL
SUM OF ALL RESPONSES TO PHQ QUESTIONS 1-9: 3
9. THOUGHTS THAT YOU WOULD BE BETTER OFF DEAD, OR OF HURTING YOURSELF: NOT AT ALL

## 2024-10-21 ASSESSMENT — ENCOUNTER SYMPTOMS
COUGH: 1
SHORTNESS OF BREATH: 1
CHEST TIGHTNESS: 1

## 2024-10-21 NOTE — PROGRESS NOTES
PROGRESS NOTE  Date of Service:  10/21/2024  [place] [facility] [place] [facility]  [address]  [facility] [address]  Dept: [phone]  Loc: [phone]    Subjective:      Patient ID: [id number]  China Kline is a 29 y.o. female    HPI: patient said she started feeling weird on Friday, she said her vision was blurry, she said her heart rate was elevated at the time, she did eat breakfast, she said she had ice coffee, dr pepper. She stopped at [facility] for that. She said she will drink water. Patient said her heart rate was 150 for ten minutes. She drank a coke.     Patient does feel like she is eating enough.     Patient said 3 weeks ago she has headache, she tested headache it was negative, she had chest pain and shortness of breath. She had negative covid and flu at the time. She said she woke up the next morning feeling better. She was negative. She said Monday she felt worse, then she said cough started. She is coughing up mucus. Patient took mucinex DM and dylsym. Patient said last 2 days constant. Patient said the chest pain she said pressure, shooting sharp pain.     Anxiety: patient said she feels it is ok. She did not like the way lexapro made her feel. She takes hydroxyne as needed.     Review of Systems   Constitutional:  Negative for chills, fatigue and fever.   Respiratory:  Positive for cough, chest tightness and shortness of breath.    Cardiovascular:  Positive for chest pain. Negative for palpitations.   All other systems reviewed and are negative.    Objective:   Physical Exam  Vitals reviewed.   Constitutional:       Appearance: She is well-developed.   HENT:      Head: Normocephalic and atraumatic.      Right Ear: Ear canal and external ear normal. A middle ear effusion is present. Tympanic membrane is erythematous.      Left Ear: Ear canal and external ear normal. A middle ear effusion is present. Tympanic membrane is erythematous.

## 2024-10-22 DIAGNOSIS — F90.0 ATTENTION DEFICIT HYPERACTIVITY DISORDER (ADHD), PREDOMINANTLY INATTENTIVE TYPE: ICD-10-CM

## 2024-10-22 LAB
ALBUMIN SERPL-MCNC: 4.7 G/DL (ref 3.4–5)
ALBUMIN/GLOB SERPL: 1.7 {RATIO} (ref 1.1–2.2)
ALP SERPL-CCNC: 99 U/L (ref 40–129)
ALT SERPL-CCNC: 14 U/L (ref 10–40)
ANION GAP SERPL CALCULATED.3IONS-SCNC: 14 MMOL/L (ref 3–16)
AST SERPL-CCNC: 19 U/L (ref 15–37)
BILIRUB SERPL-MCNC: <0.2 MG/DL (ref 0–1)
BUN SERPL-MCNC: 9 MG/DL (ref 7–20)
CALCIUM SERPL-MCNC: 9.9 MG/DL (ref 8.3–10.6)
CHLORIDE SERPL-SCNC: 105 MMOL/L (ref 99–110)
CO2 SERPL-SCNC: 25 MMOL/L (ref 21–32)
CREAT SERPL-MCNC: 0.8 MG/DL (ref 0.6–1.1)
FERRITIN SERPL IA-MCNC: 50 NG/ML (ref 15–150)
GFR SERPLBLD CREATININE-BSD FMLA CKD-EPI: >90 ML/MIN/{1.73_M2}
GLUCOSE SERPL-MCNC: 110 MG/DL (ref 70–99)
IRON SATN MFR SERPL: 12 % (ref 15–50)
IRON SERPL-MCNC: 48 UG/DL (ref 37–145)
POTASSIUM SERPL-SCNC: 3.9 MMOL/L (ref 3.5–5.1)
PROT SERPL-MCNC: 7.5 G/DL (ref 6.4–8.2)
SODIUM SERPL-SCNC: 144 MMOL/L (ref 136–145)
TIBC SERPL-MCNC: 393 UG/DL (ref 260–445)
TSH SERPL DL<=0.005 MIU/L-ACNC: 0.65 UIU/ML (ref 0.27–4.2)
VIT B12 SERPL-MCNC: 349 PG/ML (ref 211–911)

## 2024-10-22 NOTE — TELEPHONE ENCOUNTER
Medication:   Requested Prescriptions     Pending Prescriptions Disp Refills    amphetamine-dextroamphetamine (ADDERALL XR) 10 MG extended release capsule 60 capsule 0     Sig: Take 2 capsules by mouth daily for 30 days. Max Daily Amount: 20 mg        Last Filled:  [id number] #60    Patient Phone Number: [phone] (home)     Last appt: 10/21/2024   Next appt: 10/30/2024    Last OARRS:       6/23/2021     5:49 PM   RX Monitoring   Periodic Controlled Substance Monitoring No signs of potential drug abuse or diversion identified.

## 2024-10-23 RX ORDER — DEXTROAMPHETAMINE SACCHARATE, AMPHETAMINE ASPARTATE MONOHYDRATE, DEXTROAMPHETAMINE SULFATE AND AMPHETAMINE SULFATE 2.5; 2.5; 2.5; 2.5 MG/1; MG/1; MG/1; MG/1
20 CAPSULE, EXTENDED RELEASE ORAL DAILY
Qty: 60 CAPSULE | Refills: 0 | Status: SHIPPED | OUTPATIENT
Start: 2024-10-23 | End: 2024-11-22

## 2024-10-24 ENCOUNTER — HOSPITAL ENCOUNTER (OUTPATIENT)
Age: 29
Discharge: HOME | End: 2024-10-26
Payer: COMMERCIAL

## 2024-10-24 DIAGNOSIS — I47.9 PAROXYSMAL TACHYCARDIA (HCC): ICD-10-CM

## 2024-10-24 DIAGNOSIS — R00.2 PALPITATIONS: ICD-10-CM

## 2024-10-24 DIAGNOSIS — R07.9 CHEST PAIN, UNSPECIFIED TYPE: ICD-10-CM

## 2024-10-24 PROCEDURE — 93225 XTRNL ECG REC<48 HRS REC: CPT

## 2024-10-29 LAB
ACQUISITION DURATION: NORMAL S
AVERAGE HEART RATE: 113 BPM
HOOKUP DATE: NORMAL
HOOKUP TIME: NORMAL
MAX HEART RATE TIME/DATE: NORMAL
MAX HEART RATE: 153 BPM
MIN HEART RATE TIME/DATE: NORMAL
MIN HEART RATE: 81 BPM
NUMBER OF QRS COMPLEXES: NORMAL
NUMBER OF SUPRAVENTRICULAR COUPLETS: 0
NUMBER OF SUPRAVENTRICULAR ECTOPICS: 5
NUMBER OF SUPRAVENTRICULAR ISOLATED BEATS: 0
NUMBER OF VENTRICULAR BIGEMINAL CYCLES: 0
NUMBER OF VENTRICULAR COUPLETS: 0
NUMBER OF VENTRICULAR ECTOPICS: 1

## 2024-10-30 ENCOUNTER — TELEMEDICINE (OUTPATIENT)
Dept: PRIMARY CARE CLINIC | Age: 29
End: 2024-10-30

## 2024-10-30 DIAGNOSIS — R00.0 SINUS TACHYCARDIA: Primary | ICD-10-CM

## 2024-10-30 DIAGNOSIS — F90.0 ATTENTION DEFICIT HYPERACTIVITY DISORDER (ADHD), PREDOMINANTLY INATTENTIVE TYPE: ICD-10-CM

## 2024-10-30 RX ORDER — DEXTROAMPHETAMINE SACCHARATE, AMPHETAMINE ASPARTATE MONOHYDRATE, DEXTROAMPHETAMINE SULFATE AND AMPHETAMINE SULFATE 2.5; 2.5; 2.5; 2.5 MG/1; MG/1; MG/1; MG/1
10 CAPSULE, EXTENDED RELEASE ORAL 2 TIMES DAILY
Qty: 60 CAPSULE | Refills: 0 | Status: SHIPPED | OUTPATIENT
Start: 2024-12-20 | End: 2025-01-19

## 2024-10-30 RX ORDER — DEXTROAMPHETAMINE SACCHARATE, AMPHETAMINE ASPARTATE MONOHYDRATE, DEXTROAMPHETAMINE SULFATE AND AMPHETAMINE SULFATE 2.5; 2.5; 2.5; 2.5 MG/1; MG/1; MG/1; MG/1
10 CAPSULE, EXTENDED RELEASE ORAL 2 TIMES DAILY
Qty: 60 CAPSULE | Refills: 0 | Status: SHIPPED | OUTPATIENT
Start: 2025-01-20 | End: 2025-02-19

## 2024-10-30 RX ORDER — METOPROLOL SUCCINATE 25 MG/1
25 TABLET, EXTENDED RELEASE ORAL DAILY
Qty: 90 TABLET | Refills: 1 | Status: SHIPPED | OUTPATIENT
Start: 2024-10-30

## 2024-10-30 RX ORDER — DEXTROAMPHETAMINE SACCHARATE, AMPHETAMINE ASPARTATE MONOHYDRATE, DEXTROAMPHETAMINE SULFATE AND AMPHETAMINE SULFATE 2.5; 2.5; 2.5; 2.5 MG/1; MG/1; MG/1; MG/1
10 CAPSULE, EXTENDED RELEASE ORAL 2 TIMES DAILY
Qty: 60 CAPSULE | Refills: 0 | Status: SHIPPED | OUTPATIENT
Start: 2024-11-22 | End: 2024-12-22

## 2024-10-30 ASSESSMENT — ENCOUNTER SYMPTOMS: COUGH: 0

## 2024-10-30 NOTE — PROGRESS NOTES
times daily as needed for Pain  Trisha Neal PA   ferrous sulfate (IRON 325) 325 (65 Fe) MG tablet Take 1 tablet by mouth 2 times daily  Yvrose Cisneros APRN - CNP       Social History     Tobacco Use    Smoking status: Never    Smokeless tobacco: Never   Vaping Use    Vaping status: Never Used   Substance Use Topics    Alcohol use: Yes     Comment: socially     Drug use: No        Allergies   Allergen Reactions    Adhesive Tape      SKIN IRRITATION    Zithromax [Azithromycin Dihydrate] Hives    Morphine Nausea And Vomiting and Other (See Comments)     H/A  migraine       PHYSICAL EXAMINATION:  [ INSTRUCTIONS:  \"[x]\" Indicates a positive item  \"[]\" Indicates a negative item  -- DELETE ALL ITEMS NOT EXAMINED]  Vital Signs: (As obtained by patient/caregiver or practitioner observation)    Blood pressure-  Heart rate-    Respiratory rate-    Temperature-  Pulse oximetry-     Constitutional: [x] Appears well-developed and well-nourished [x] No apparent distress      [] Abnormal-   Mental status  [x] Alert and awake  [x] Oriented to person/place/time [x]Able to follow commands      Eyes:  EOM    [x]  Normal  [] Abnormal-  Sclera  []  Normal  [] Abnormal -         Discharge []  None visible  [] Abnormal -    HENT:   [x] Normocephalic, atraumatic.  [] Abnormal   [] Mouth/Throat: Mucous membranes are moist.     External Ears [x] Normal  [] Abnormal-     Neck: [x] No visualized mass     Pulmonary/Chest: [x] Respiratory effort normal.  [x] No visualized signs of difficulty breathing or respiratory distress        [] Abnormal-      Musculoskeletal:   [x] Normal gait with no signs of ataxia         [] Normal range of motion of neck        [] Abnormal-       Neurological:        [x] No Facial Asymmetry (Cranial nerve 7 motor function) (limited exam to video visit)          [] No gaze palsy        [] Abnormal-         Skin:        [x] No significant exanthematous lesions or discoloration noted on facial skin         []

## 2024-11-27 ENCOUNTER — HOSPITAL ENCOUNTER (OUTPATIENT)
Age: 29
Discharge: HOME | End: 2024-11-29
Payer: COMMERCIAL

## 2024-11-27 VITALS
HEIGHT: 61 IN | DIASTOLIC BLOOD PRESSURE: 76 MMHG | SYSTOLIC BLOOD PRESSURE: 120 MMHG | WEIGHT: 148 LBS | BODY MASS INDEX: 27.94 KG/M2

## 2024-11-27 DIAGNOSIS — R07.9 CHEST PAIN, UNSPECIFIED TYPE: ICD-10-CM

## 2024-11-27 LAB
ECHO AO ASC DIAM: 2.9 CM
ECHO AO ASCENDING AORTA INDEX: 1.75 CM/M2
ECHO AO ROOT DIAM: 2.2 CM
ECHO AO ROOT INDEX: 1.33 CM/M2
ECHO AV AREA PEAK VELOCITY: 2.1 CM2
ECHO AV AREA VTI: 2 CM2
ECHO AV AREA/BSA PEAK VELOCITY: 1.3 CM2/M2
ECHO AV AREA/BSA VTI: 1.2 CM2/M2
ECHO AV MEAN GRADIENT: 4 MMHG
ECHO AV MEAN GRADIENT: 4 MMHG
ECHO AV MEAN VELOCITY: 1 M/S
ECHO AV PEAK GRADIENT: 6 MMHG
ECHO AV PEAK VELOCITY: 1.2 M/S
ECHO AV VELOCITY RATIO: 0.83
ECHO AV VTI: 23.1 CM
ECHO BSA: 1.7 M2
ECHO LA AREA 2C: 15.2 CM2
ECHO LA AREA 4C: 14.3 CM2
ECHO LA DIAMETER INDEX: 1.75 CM/M2
ECHO LA DIAMETER: 2.9 CM
ECHO LA MAJOR AXIS: 5 CM
ECHO LA MINOR AXIS: 4.6 CM
ECHO LA TO AORTIC ROOT RATIO: 1.32
ECHO LA VOL BP: 37 ML (ref 22–52)
ECHO LA VOL MOD A2C: 40 ML (ref 22–52)
ECHO LA VOL MOD A4C: 32 ML (ref 22–52)
ECHO LA VOL/BSA BIPLANE: 22 ML/M2 (ref 16–34)
ECHO LA VOLUME INDEX MOD A2C: 24 ML/M2 (ref 16–34)
ECHO LA VOLUME INDEX MOD A4C: 19 ML/M2 (ref 16–34)
ECHO LV E' LATERAL VELOCITY: 19.4 CM/S
ECHO LV E' SEPTAL VELOCITY: 12.4 CM/S
ECHO LV EDV A2C: 87 ML
ECHO LV EDV A4C: 85 ML
ECHO LV EDV INDEX A4C: 51 ML/M2
ECHO LV EDV NDEX A2C: 52 ML/M2
ECHO LV EF PHYSICIAN: 50 %
ECHO LV EJECTION FRACTION A2C: 48 %
ECHO LV EJECTION FRACTION A4C: 42 %
ECHO LV ESV A2C: 45 ML
ECHO LV ESV A4C: 49 ML
ECHO LV ESV INDEX A2C: 27 ML/M2
ECHO LV ESV INDEX A4C: 30 ML/M2
ECHO LV FRACTIONAL SHORTENING: 24 % (ref 28–44)
ECHO LV INTERNAL DIMENSION DIASTOLE INDEX: 2.53 CM/M2
ECHO LV INTERNAL DIMENSION DIASTOLIC: 4.2 CM (ref 3.9–5.3)
ECHO LV INTERNAL DIMENSION SYSTOLIC INDEX: 1.93 CM/M2
ECHO LV INTERNAL DIMENSION SYSTOLIC: 3.2 CM
ECHO LV IVSD: 0.6 CM (ref 0.6–0.9)
ECHO LV MASS 2D: 77.4 G (ref 67–162)
ECHO LV MASS INDEX 2D: 46.6 G/M2 (ref 43–95)
ECHO LV POSTERIOR WALL DIASTOLIC: 0.7 CM (ref 0.6–0.9)
ECHO LV RELATIVE WALL THICKNESS RATIO: 0.33
ECHO LVOT AREA: 2.5 CM2
ECHO LVOT AV VTI INDEX: 0.77
ECHO LVOT DIAM: 1.8 CM
ECHO LVOT MEAN GRADIENT: 2 MMHG
ECHO LVOT PEAK GRADIENT: 4 MMHG
ECHO LVOT PEAK VELOCITY: 1 M/S
ECHO LVOT STROKE VOLUME INDEX: 27.4 ML/M2
ECHO LVOT SV: 45.5 ML
ECHO LVOT VTI: 17.9 CM
ECHO MV A VELOCITY: 0.64 M/S
ECHO MV E VELOCITY: 0.77 M/S
ECHO MV E/A RATIO: 1.2
ECHO MV E/E' LATERAL: 3.97
ECHO MV E/E' RATIO (AVERAGED): 5.09
ECHO MV E/E' SEPTAL: 6.21
ECHO PV MAX VELOCITY: 1.2 M/S
ECHO PV PEAK GRADIENT: 5 MMHG
ECHO RA AREA 4C: 10.8 CM2
ECHO RA END SYSTOLIC VOLUME APICAL 4 CHAMBER INDEX BSA: 13 ML/M2
ECHO RA VOLUME: 22 ML
ECHO RV BASAL DIMENSION: 3.1 CM
ECHO RV FREE WALL PEAK S': 15.9 CM/S
ECHO RV MID DIMENSION: 2.1 CM
ECHO RV TAPSE: 2.2 CM (ref 1.7–?)

## 2024-11-27 PROCEDURE — 93306 TTE W/DOPPLER COMPLETE: CPT

## 2024-11-27 PROCEDURE — 93306 TTE W/DOPPLER COMPLETE: CPT | Performed by: INTERNAL MEDICINE

## 2024-12-27 DIAGNOSIS — N92.0 MENORRHAGIA WITH REGULAR CYCLE: ICD-10-CM

## 2024-12-27 RX ORDER — MEDROXYPROGESTERONE ACETATE 150 MG/ML
150 INJECTION, SUSPENSION INTRAMUSCULAR
Qty: 1 ML | Refills: 3 | Status: SHIPPED | OUTPATIENT
Start: 2024-12-27

## 2024-12-27 NOTE — TELEPHONE ENCOUNTER
Medication:   Requested Prescriptions     Pending Prescriptions Disp Refills    medroxyPROGESTERone (DEPO-PROVERA) 150 MG/ML injection 1 mL 3     Sig: Inject 1 mL into the muscle every 3 months        Last Filled:  12/22/23 3 refills    Patient Phone Number: [phone] (home)     Last appt: 10/30/2024   Next appt: Visit date not found    Last OARRS:       6/23/2021     5:49 PM   RX Monitoring   Periodic Controlled Substance Monitoring No signs of potential drug abuse or diversion identified.

## 2025-01-02 DIAGNOSIS — F90.0 ATTENTION DEFICIT HYPERACTIVITY DISORDER (ADHD), PREDOMINANTLY INATTENTIVE TYPE: ICD-10-CM

## 2025-01-02 RX ORDER — DEXTROAMPHETAMINE SACCHARATE, AMPHETAMINE ASPARTATE MONOHYDRATE, DEXTROAMPHETAMINE SULFATE AND AMPHETAMINE SULFATE 2.5; 2.5; 2.5; 2.5 MG/1; MG/1; MG/1; MG/1
10 CAPSULE, EXTENDED RELEASE ORAL 2 TIMES DAILY
Qty: 60 CAPSULE | Refills: 0 | OUTPATIENT
Start: 2025-01-02 | End: 2025-02-01

## 2025-01-23 ENCOUNTER — HOSPITAL ENCOUNTER (OUTPATIENT)
Dept: CT IMAGING | Age: 30
Discharge: HOME | End: 2025-01-23
Payer: COMMERCIAL

## 2025-01-23 DIAGNOSIS — R10.9 LEFT FLANK PAIN: ICD-10-CM

## 2025-01-23 DIAGNOSIS — R10.30 LOWER ABDOMINAL PAIN: ICD-10-CM

## 2025-01-23 DIAGNOSIS — R50.9 FEVER, UNSPECIFIED FEVER CAUSE: ICD-10-CM

## 2025-01-23 PROCEDURE — 6360000004 HC RX CONTRAST MEDICATION: Performed by: NURSE PRACTITIONER

## 2025-01-23 PROCEDURE — 74177 CT ABD & PELVIS W/CONTRAST: CPT

## 2025-01-23 RX ORDER — IOPAMIDOL 612 MG/ML
50 INJECTION, SOLUTION INTRAVASCULAR
Status: COMPLETED | OUTPATIENT
Start: 2025-01-23 | End: 2025-01-23

## 2025-01-23 RX ORDER — IOPAMIDOL 755 MG/ML
75 INJECTION, SOLUTION INTRAVASCULAR
Status: COMPLETED | OUTPATIENT
Start: 2025-01-23 | End: 2025-01-23

## 2025-01-23 RX ADMIN — IOPAMIDOL 75 ML: 755 INJECTION, SOLUTION INTRAVENOUS at 10:17

## 2025-01-23 RX ADMIN — IOPAMIDOL 50 ML: 612 INJECTION, SOLUTION INTRAVENOUS at 10:16

## 2025-02-06 DIAGNOSIS — F90.0 ATTENTION DEFICIT HYPERACTIVITY DISORDER (ADHD), PREDOMINANTLY INATTENTIVE TYPE: ICD-10-CM

## 2025-02-06 RX ORDER — DEXTROAMPHETAMINE SACCHARATE, AMPHETAMINE ASPARTATE MONOHYDRATE, DEXTROAMPHETAMINE SULFATE AND AMPHETAMINE SULFATE 2.5; 2.5; 2.5; 2.5 MG/1; MG/1; MG/1; MG/1
10 CAPSULE, EXTENDED RELEASE ORAL 2 TIMES DAILY
Qty: 60 CAPSULE | Refills: 0 | OUTPATIENT
Start: 2025-02-06 | End: 2025-03-08

## 2025-02-06 RX ORDER — DEXTROAMPHETAMINE SACCHARATE, AMPHETAMINE ASPARTATE MONOHYDRATE, DEXTROAMPHETAMINE SULFATE AND AMPHETAMINE SULFATE 2.5; 2.5; 2.5; 2.5 MG/1; MG/1; MG/1; MG/1
20 CAPSULE, EXTENDED RELEASE ORAL DAILY
Qty: 60 CAPSULE | Refills: 0 | OUTPATIENT
Start: 2025-02-06 | End: 2025-03-08

## 2025-02-26 ENCOUNTER — TELEMEDICINE (OUTPATIENT)
Dept: PRIMARY CARE CLINIC | Age: 30
End: 2025-02-26
Payer: COMMERCIAL

## 2025-02-26 DIAGNOSIS — F33.2 SEVERE EPISODE OF RECURRENT MAJOR DEPRESSIVE DISORDER, WITHOUT PSYCHOTIC FEATURES (HCC): ICD-10-CM

## 2025-02-26 DIAGNOSIS — F90.0 ATTENTION DEFICIT HYPERACTIVITY DISORDER (ADHD), PREDOMINANTLY INATTENTIVE TYPE: Primary | ICD-10-CM

## 2025-02-26 PROCEDURE — 99214 OFFICE O/P EST MOD 30 MIN: CPT | Performed by: NURSE PRACTITIONER

## 2025-02-26 PROCEDURE — G8427 DOCREV CUR MEDS BY ELIG CLIN: HCPCS | Performed by: NURSE PRACTITIONER

## 2025-02-26 PROCEDURE — G2211 COMPLEX E/M VISIT ADD ON: HCPCS | Performed by: NURSE PRACTITIONER

## 2025-02-26 RX ORDER — DEXTROAMPHETAMINE SACCHARATE, AMPHETAMINE ASPARTATE MONOHYDRATE, DEXTROAMPHETAMINE SULFATE AND AMPHETAMINE SULFATE 7.5; 7.5; 7.5; 7.5 MG/1; MG/1; MG/1; MG/1
30 CAPSULE, EXTENDED RELEASE ORAL DAILY
Qty: 30 CAPSULE | Refills: 0 | Status: SHIPPED | OUTPATIENT
Start: 2025-04-27 | End: 2025-05-27

## 2025-02-26 RX ORDER — DEXTROAMPHETAMINE SACCHARATE, AMPHETAMINE ASPARTATE MONOHYDRATE, DEXTROAMPHETAMINE SULFATE AND AMPHETAMINE SULFATE 7.5; 7.5; 7.5; 7.5 MG/1; MG/1; MG/1; MG/1
30 CAPSULE, EXTENDED RELEASE ORAL DAILY
Qty: 30 CAPSULE | Refills: 0 | Status: SHIPPED | OUTPATIENT
Start: 2025-02-26 | End: 2025-03-28

## 2025-02-26 RX ORDER — DEXTROAMPHETAMINE SACCHARATE, AMPHETAMINE ASPARTATE MONOHYDRATE, DEXTROAMPHETAMINE SULFATE AND AMPHETAMINE SULFATE 7.5; 7.5; 7.5; 7.5 MG/1; MG/1; MG/1; MG/1
30 CAPSULE, EXTENDED RELEASE ORAL DAILY
Qty: 30 CAPSULE | Refills: 0 | Status: SHIPPED | OUTPATIENT
Start: 2025-03-28 | End: 2025-04-27

## 2025-02-26 SDOH — ECONOMIC STABILITY: TRANSPORTATION INSECURITY
IN THE PAST 12 MONTHS, HAS LACK OF TRANSPORTATION KEPT YOU FROM MEETINGS, WORK, OR FROM GETTING THINGS NEEDED FOR DAILY LIVING?: NO

## 2025-02-26 SDOH — ECONOMIC STABILITY: FOOD INSECURITY: WITHIN THE PAST 12 MONTHS, THE FOOD YOU BOUGHT JUST DIDN'T LAST AND YOU DIDN'T HAVE MONEY TO GET MORE.: NEVER TRUE

## 2025-02-26 SDOH — ECONOMIC STABILITY: FOOD INSECURITY: WITHIN THE PAST 12 MONTHS, YOU WORRIED THAT YOUR FOOD WOULD RUN OUT BEFORE YOU GOT MONEY TO BUY MORE.: NEVER TRUE

## 2025-02-26 SDOH — ECONOMIC STABILITY: INCOME INSECURITY: IN THE LAST 12 MONTHS, WAS THERE A TIME WHEN YOU WERE NOT ABLE TO PAY THE MORTGAGE OR RENT ON TIME?: NO

## 2025-02-26 SDOH — ECONOMIC STABILITY: TRANSPORTATION INSECURITY
IN THE PAST 12 MONTHS, HAS THE LACK OF TRANSPORTATION KEPT YOU FROM MEDICAL APPOINTMENTS OR FROM GETTING MEDICATIONS?: NO

## 2025-02-26 ASSESSMENT — ENCOUNTER SYMPTOMS
COUGH: 0
SHORTNESS OF BREATH: 0
WHEEZING: 0

## 2025-02-26 NOTE — PROGRESS NOTES
2025    TELEHEALTH EVALUATION -- Audio/Visual    HPI:    China Kline (:  1995) has requested an audio/video evaluation for the following concern(s):    Patient is on vv for adhd, she said she does not feel her adderall is working as much, she said days when she  notices if she does not take it, she said it helps a little not like it used too. Patient said at home it is hard to do anything at home.     Patient said she is sleeping ok, she started taking meletonin.     Depression: patient said her mood has been good.     Anxiety: patient said her beta blocker does help. Patient said that she feels better patient said when she does not take her adderall her irritability is no good.     Review of Systems   Constitutional:  Negative for chills, fatigue and fever.   Respiratory:  Negative for cough, shortness of breath and wheezing.    Cardiovascular:  Negative for chest pain, palpitations and leg swelling.   Psychiatric/Behavioral:  Negative for self-injury, sleep disturbance and suicidal ideas. The patient is not nervous/anxious.    All other systems reviewed and are negative.      Prior to Visit Medications    Medication Sig Taking? Authorizing Provider   amphetamine-dextroamphetamine (ADDERALL XR) 30 MG extended release capsule Take 1 capsule by mouth daily for 30 days. Max Daily Amount: 30 mg Yes Yvrose Cisneros APRN - CNP   amphetamine-dextroamphetamine (ADDERALL XR) 30 MG extended release capsule Take 1 capsule by mouth daily for 30 days. Max Daily Amount: 30 mg Yes Yvrose Cisneros APRN - CNP   amphetamine-dextroamphetamine (ADDERALL XR) 30 MG extended release capsule Take 1 capsule by mouth daily for 30 days. Max Daily Amount: 30 mg Yes Yvrose Cisneros APRN - CNP   albuterol sulfate HFA (VENTOLIN HFA) 108 (90 Base) MCG/ACT inhaler Inhale 2 puffs into the lungs 4 times daily as needed for Wheezing  Filiberto Miranda APRN - CNP   medroxyPROGESTERone (DEPO-PROVERA) 150 MG/ML

## 2025-04-21 DIAGNOSIS — F90.0 ATTENTION DEFICIT HYPERACTIVITY DISORDER (ADHD), PREDOMINANTLY INATTENTIVE TYPE: ICD-10-CM

## 2025-04-21 RX ORDER — DEXTROAMPHETAMINE SACCHARATE, AMPHETAMINE ASPARTATE MONOHYDRATE, DEXTROAMPHETAMINE SULFATE AND AMPHETAMINE SULFATE 7.5; 7.5; 7.5; 7.5 MG/1; MG/1; MG/1; MG/1
30 CAPSULE, EXTENDED RELEASE ORAL DAILY
Qty: 30 CAPSULE | Refills: 0 | Status: SHIPPED | OUTPATIENT
Start: 2025-04-21 | End: 2025-05-21

## 2025-05-21 ENCOUNTER — TELEMEDICINE (OUTPATIENT)
Dept: PRIMARY CARE CLINIC | Age: 30
End: 2025-05-21
Payer: COMMERCIAL

## 2025-05-21 DIAGNOSIS — F90.0 ATTENTION DEFICIT HYPERACTIVITY DISORDER (ADHD), PREDOMINANTLY INATTENTIVE TYPE: Primary | ICD-10-CM

## 2025-05-21 PROCEDURE — 99214 OFFICE O/P EST MOD 30 MIN: CPT | Performed by: NURSE PRACTITIONER

## 2025-05-21 PROCEDURE — G8427 DOCREV CUR MEDS BY ELIG CLIN: HCPCS | Performed by: NURSE PRACTITIONER

## 2025-05-21 RX ORDER — DEXTROAMPHETAMINE SACCHARATE, AMPHETAMINE ASPARTATE MONOHYDRATE, DEXTROAMPHETAMINE SULFATE AND AMPHETAMINE SULFATE 7.5; 7.5; 7.5; 7.5 MG/1; MG/1; MG/1; MG/1
30 CAPSULE, EXTENDED RELEASE ORAL DAILY
Qty: 30 CAPSULE | Refills: 0 | Status: SHIPPED | OUTPATIENT
Start: 2025-06-20 | End: 2025-07-20

## 2025-05-21 RX ORDER — DEXTROAMPHETAMINE SACCHARATE, AMPHETAMINE ASPARTATE MONOHYDRATE, DEXTROAMPHETAMINE SULFATE AND AMPHETAMINE SULFATE 7.5; 7.5; 7.5; 7.5 MG/1; MG/1; MG/1; MG/1
30 CAPSULE, EXTENDED RELEASE ORAL DAILY
Qty: 30 CAPSULE | Refills: 0 | Status: SHIPPED | OUTPATIENT
Start: 2025-07-20 | End: 2025-08-19

## 2025-05-21 RX ORDER — DEXTROAMPHETAMINE SACCHARATE, AMPHETAMINE ASPARTATE MONOHYDRATE, DEXTROAMPHETAMINE SULFATE AND AMPHETAMINE SULFATE 7.5; 7.5; 7.5; 7.5 MG/1; MG/1; MG/1; MG/1
30 CAPSULE, EXTENDED RELEASE ORAL DAILY
Qty: 30 CAPSULE | Refills: 0 | Status: SHIPPED | OUTPATIENT
Start: 2025-05-21 | End: 2025-06-20

## 2025-05-21 ASSESSMENT — ENCOUNTER SYMPTOMS
SHORTNESS OF BREATH: 0
WHEEZING: 0
COUGH: 0

## 2025-05-21 NOTE — PROGRESS NOTES
apparent distress      [] Abnormal-   Mental status  [x] Alert and awake  [x] Oriented to person/place/time [x]Able to follow commands      Eyes:  EOM    [x]  Normal  [] Abnormal-  Sclera  []  Normal  [] Abnormal -         Discharge []  None visible  [] Abnormal -    HENT:   [x] Normocephalic, atraumatic.  [] Abnormal   [] Mouth/Throat: Mucous membranes are moist.     External Ears [x] Normal  [] Abnormal-     Neck: [x] No visualized mass     Pulmonary/Chest: [x] Respiratory effort normal.  [x] No visualized signs of difficulty breathing or respiratory distress        [] Abnormal-      Musculoskeletal:   [x] Normal gait with no signs of ataxia         [] Normal range of motion of neck        [] Abnormal-       Neurological:        [x] No Facial Asymmetry (Cranial nerve 7 motor function) (limited exam to video visit)          [] No gaze palsy        [] Abnormal-         Skin:        [x] No significant exanthematous lesions or discoloration noted on facial skin         [] Abnormal-            Psychiatric:       [x] Normal Affect [x] No Hallucinations        [] Abnormal-     Other pertinent observable physical exam findings-          The patient (or guardian, if applicable) and other individuals in attendance with the patient were advised that Artificial Intelligence will be utilized during this visit to record, process the conversation to generate a clinical note, and support improvement of the AI technology. The patient (or guardian, if applicable) and other individuals in attendance at the appointment consented to the use of AI, including the recording.            An electronic signature was used to authenticate this note.    --AMMY Salazar - CNP

## 2025-08-20 ENCOUNTER — TELEMEDICINE (OUTPATIENT)
Dept: PRIMARY CARE CLINIC | Age: 30
End: 2025-08-20

## 2025-08-20 DIAGNOSIS — F90.0 ATTENTION DEFICIT HYPERACTIVITY DISORDER (ADHD), PREDOMINANTLY INATTENTIVE TYPE: Primary | ICD-10-CM

## 2025-08-20 DIAGNOSIS — K21.9 GASTROESOPHAGEAL REFLUX DISEASE, UNSPECIFIED WHETHER ESOPHAGITIS PRESENT: ICD-10-CM

## 2025-08-20 DIAGNOSIS — D50.9 IRON DEFICIENCY ANEMIA, UNSPECIFIED IRON DEFICIENCY ANEMIA TYPE: ICD-10-CM

## 2025-08-20 RX ORDER — FAMOTIDINE 20 MG/1
20 TABLET, FILM COATED ORAL 2 TIMES DAILY
Qty: 180 TABLET | Refills: 3 | Status: SHIPPED | OUTPATIENT
Start: 2025-08-20

## 2025-08-20 RX ORDER — DOCUSATE SODIUM 100 MG/1
100 CAPSULE, LIQUID FILLED ORAL DAILY PRN
Qty: 90 CAPSULE | Refills: 1 | Status: SHIPPED | OUTPATIENT
Start: 2025-08-20

## 2025-08-20 RX ORDER — DEXTROAMPHETAMINE SACCHARATE, AMPHETAMINE ASPARTATE MONOHYDRATE, DEXTROAMPHETAMINE SULFATE AND AMPHETAMINE SULFATE 7.5; 7.5; 7.5; 7.5 MG/1; MG/1; MG/1; MG/1
30 CAPSULE, EXTENDED RELEASE ORAL DAILY
Qty: 30 CAPSULE | Refills: 0 | Status: SHIPPED | OUTPATIENT
Start: 2025-09-26 | End: 2025-10-26

## 2025-08-20 RX ORDER — DEXTROAMPHETAMINE SACCHARATE, AMPHETAMINE ASPARTATE MONOHYDRATE, DEXTROAMPHETAMINE SULFATE AND AMPHETAMINE SULFATE 7.5; 7.5; 7.5; 7.5 MG/1; MG/1; MG/1; MG/1
30 CAPSULE, EXTENDED RELEASE ORAL DAILY
Qty: 30 CAPSULE | Refills: 0 | Status: SHIPPED | OUTPATIENT
Start: 2025-08-27 | End: 2025-09-26

## 2025-08-20 RX ORDER — DEXTROAMPHETAMINE SACCHARATE, AMPHETAMINE ASPARTATE MONOHYDRATE, DEXTROAMPHETAMINE SULFATE AND AMPHETAMINE SULFATE 7.5; 7.5; 7.5; 7.5 MG/1; MG/1; MG/1; MG/1
30 CAPSULE, EXTENDED RELEASE ORAL DAILY
Qty: 30 CAPSULE | Refills: 0 | Status: SHIPPED | OUTPATIENT
Start: 2025-10-24 | End: 2025-11-23

## 2025-08-20 ASSESSMENT — ENCOUNTER SYMPTOMS
WHEEZING: 0
COUGH: 0
SHORTNESS OF BREATH: 0